# Patient Record
Sex: FEMALE | Race: WHITE | NOT HISPANIC OR LATINO | URBAN - METROPOLITAN AREA
[De-identification: names, ages, dates, MRNs, and addresses within clinical notes are randomized per-mention and may not be internally consistent; named-entity substitution may affect disease eponyms.]

---

## 2018-11-17 ENCOUNTER — INPATIENT (INPATIENT)
Facility: HOSPITAL | Age: 83
LOS: 12 days | Discharge: OTHER INSTITUTION | DRG: 389 | End: 2018-11-30
Attending: INTERNAL MEDICINE | Admitting: INTERNAL MEDICINE
Payer: MEDICARE

## 2018-11-17 VITALS
HEIGHT: 60 IN | RESPIRATION RATE: 19 BRPM | OXYGEN SATURATION: 97 % | DIASTOLIC BLOOD PRESSURE: 83 MMHG | WEIGHT: 119.93 LBS | TEMPERATURE: 98 F | SYSTOLIC BLOOD PRESSURE: 173 MMHG | HEART RATE: 84 BPM

## 2018-11-17 DIAGNOSIS — Z90.49 ACQUIRED ABSENCE OF OTHER SPECIFIED PARTS OF DIGESTIVE TRACT: Chronic | ICD-10-CM

## 2018-11-17 LAB
ALBUMIN SERPL ELPH-MCNC: 3.4 G/DL — SIGNIFICANT CHANGE UP (ref 3.3–5)
ALP SERPL-CCNC: 50 U/L — SIGNIFICANT CHANGE UP (ref 40–120)
ALT FLD-CCNC: 19 U/L — SIGNIFICANT CHANGE UP (ref 12–78)
ANION GAP SERPL CALC-SCNC: 10 MMOL/L — SIGNIFICANT CHANGE UP (ref 5–17)
APPEARANCE UR: CLEAR — SIGNIFICANT CHANGE UP
APTT BLD: 31 SEC — SIGNIFICANT CHANGE UP (ref 27.5–36.3)
AST SERPL-CCNC: 23 U/L — SIGNIFICANT CHANGE UP (ref 15–37)
BASOPHILS # BLD AUTO: 0.04 K/UL — SIGNIFICANT CHANGE UP (ref 0–0.2)
BASOPHILS NFR BLD AUTO: 0.4 % — SIGNIFICANT CHANGE UP (ref 0–2)
BILIRUB SERPL-MCNC: 0.8 MG/DL — SIGNIFICANT CHANGE UP (ref 0.2–1.2)
BILIRUB UR-MCNC: NEGATIVE — SIGNIFICANT CHANGE UP
BUN SERPL-MCNC: 21 MG/DL — SIGNIFICANT CHANGE UP (ref 7–23)
CALCIUM SERPL-MCNC: 8.9 MG/DL — SIGNIFICANT CHANGE UP (ref 8.5–10.1)
CHLORIDE SERPL-SCNC: 102 MMOL/L — SIGNIFICANT CHANGE UP (ref 96–108)
CO2 SERPL-SCNC: 27 MMOL/L — SIGNIFICANT CHANGE UP (ref 22–31)
COLOR SPEC: YELLOW — SIGNIFICANT CHANGE UP
CREAT SERPL-MCNC: 0.85 MG/DL — SIGNIFICANT CHANGE UP (ref 0.5–1.3)
DIFF PNL FLD: ABNORMAL
EOSINOPHIL # BLD AUTO: 0 K/UL — SIGNIFICANT CHANGE UP (ref 0–0.5)
EOSINOPHIL NFR BLD AUTO: 0 % — SIGNIFICANT CHANGE UP (ref 0–6)
GLUCOSE SERPL-MCNC: 158 MG/DL — HIGH (ref 70–99)
GLUCOSE UR QL: NEGATIVE — SIGNIFICANT CHANGE UP
HCT VFR BLD CALC: 42 % — SIGNIFICANT CHANGE UP (ref 34.5–45)
HGB BLD-MCNC: 14.8 G/DL — SIGNIFICANT CHANGE UP (ref 11.5–15.5)
IMM GRANULOCYTES NFR BLD AUTO: 0.4 % — SIGNIFICANT CHANGE UP (ref 0–1.5)
INR BLD: 1.08 RATIO — SIGNIFICANT CHANGE UP (ref 0.88–1.16)
KETONES UR-MCNC: ABNORMAL
LACTATE SERPL-SCNC: 1.6 MMOL/L — SIGNIFICANT CHANGE UP (ref 0.7–2)
LEUKOCYTE ESTERASE UR-ACNC: NEGATIVE — SIGNIFICANT CHANGE UP
LIDOCAIN IGE QN: 145 U/L — SIGNIFICANT CHANGE UP (ref 73–393)
LYMPHOCYTES # BLD AUTO: 0.82 K/UL — LOW (ref 1–3.3)
LYMPHOCYTES # BLD AUTO: 8 % — LOW (ref 13–44)
MCHC RBC-ENTMCNC: 32.5 PG — SIGNIFICANT CHANGE UP (ref 27–34)
MCHC RBC-ENTMCNC: 35.2 GM/DL — SIGNIFICANT CHANGE UP (ref 32–36)
MCV RBC AUTO: 92.3 FL — SIGNIFICANT CHANGE UP (ref 80–100)
MONOCYTES # BLD AUTO: 0.49 K/UL — SIGNIFICANT CHANGE UP (ref 0–0.9)
MONOCYTES NFR BLD AUTO: 4.8 % — SIGNIFICANT CHANGE UP (ref 2–14)
NEUTROPHILS # BLD AUTO: 8.92 K/UL — HIGH (ref 1.8–7.4)
NEUTROPHILS NFR BLD AUTO: 86.4 % — HIGH (ref 43–77)
NITRITE UR-MCNC: NEGATIVE — SIGNIFICANT CHANGE UP
PH UR: 7 — SIGNIFICANT CHANGE UP (ref 5–8)
PLATELET # BLD AUTO: 178 K/UL — SIGNIFICANT CHANGE UP (ref 150–400)
POTASSIUM SERPL-MCNC: 3.6 MMOL/L — SIGNIFICANT CHANGE UP (ref 3.5–5.3)
POTASSIUM SERPL-SCNC: 3.6 MMOL/L — SIGNIFICANT CHANGE UP (ref 3.5–5.3)
PROT SERPL-MCNC: 7.6 G/DL — SIGNIFICANT CHANGE UP (ref 6–8.3)
PROT UR-MCNC: NEGATIVE — SIGNIFICANT CHANGE UP
PROTHROM AB SERPL-ACNC: 12.2 SEC — SIGNIFICANT CHANGE UP (ref 10–12.9)
RBC # BLD: 4.55 M/UL — SIGNIFICANT CHANGE UP (ref 3.8–5.2)
RBC # FLD: 12.3 % — SIGNIFICANT CHANGE UP (ref 10.3–14.5)
SODIUM SERPL-SCNC: 139 MMOL/L — SIGNIFICANT CHANGE UP (ref 135–145)
SP GR SPEC: 1.01 — SIGNIFICANT CHANGE UP (ref 1.01–1.02)
UROBILINOGEN FLD QL: NEGATIVE — SIGNIFICANT CHANGE UP
WBC # BLD: 10.31 K/UL — SIGNIFICANT CHANGE UP (ref 3.8–10.5)
WBC # FLD AUTO: 10.31 K/UL — SIGNIFICANT CHANGE UP (ref 3.8–10.5)

## 2018-11-17 PROCEDURE — 93010 ELECTROCARDIOGRAM REPORT: CPT

## 2018-11-17 PROCEDURE — 74177 CT ABD & PELVIS W/CONTRAST: CPT | Mod: 26

## 2018-11-17 RX ORDER — SODIUM CHLORIDE 9 MG/ML
1000 INJECTION INTRAMUSCULAR; INTRAVENOUS; SUBCUTANEOUS ONCE
Qty: 0 | Refills: 0 | Status: COMPLETED | OUTPATIENT
Start: 2018-11-17 | End: 2018-11-17

## 2018-11-17 RX ORDER — ONDANSETRON 8 MG/1
4 TABLET, FILM COATED ORAL ONCE
Qty: 0 | Refills: 0 | Status: COMPLETED | OUTPATIENT
Start: 2018-11-17 | End: 2018-11-17

## 2018-11-17 RX ORDER — SODIUM CHLORIDE 9 MG/ML
3 INJECTION INTRAMUSCULAR; INTRAVENOUS; SUBCUTANEOUS ONCE
Qty: 0 | Refills: 0 | Status: COMPLETED | OUTPATIENT
Start: 2018-11-17 | End: 2018-11-17

## 2018-11-17 RX ORDER — IOHEXOL 300 MG/ML
30 INJECTION, SOLUTION INTRAVENOUS ONCE
Qty: 0 | Refills: 0 | Status: COMPLETED | OUTPATIENT
Start: 2018-11-17 | End: 2018-11-17

## 2018-11-17 RX ORDER — ACETAMINOPHEN 500 MG
1000 TABLET ORAL ONCE
Qty: 0 | Refills: 0 | Status: COMPLETED | OUTPATIENT
Start: 2018-11-17 | End: 2018-11-17

## 2018-11-17 RX ADMIN — SODIUM CHLORIDE 3 MILLILITER(S): 9 INJECTION INTRAMUSCULAR; INTRAVENOUS; SUBCUTANEOUS at 21:00

## 2018-11-17 RX ADMIN — Medication 1000 MILLIGRAM(S): at 21:42

## 2018-11-17 RX ADMIN — Medication 1000 MILLIGRAM(S): at 21:41

## 2018-11-17 RX ADMIN — IOHEXOL 30 MILLILITER(S): 300 INJECTION, SOLUTION INTRAVENOUS at 21:41

## 2018-11-17 RX ADMIN — SODIUM CHLORIDE 1000 MILLILITER(S): 9 INJECTION INTRAMUSCULAR; INTRAVENOUS; SUBCUTANEOUS at 22:58

## 2018-11-17 RX ADMIN — Medication 400 MILLIGRAM(S): at 21:40

## 2018-11-17 RX ADMIN — ONDANSETRON 4 MILLIGRAM(S): 8 TABLET, FILM COATED ORAL at 21:41

## 2018-11-17 RX ADMIN — SODIUM CHLORIDE 1000 MILLILITER(S): 9 INJECTION INTRAMUSCULAR; INTRAVENOUS; SUBCUTANEOUS at 21:40

## 2018-11-17 NOTE — ED ADULT NURSE NOTE - NSIMPLEMENTINTERV_GEN_ALL_ED
Implemented All Universal Safety Interventions:  [place] to call system. Call bell, personal items and telephone within reach. Instruct patient to call for assistance. Room bathroom lighting operational. Non-slip footwear when patient is off stretcher. Physically safe environment: no spills, clutter or unnecessary equipment. Stretcher in lowest position, wheels locked, appropriate side rails in place.

## 2018-11-17 NOTE — ED PROVIDER NOTE - OBJECTIVE STATEMENT
89 year old female that has a history of a left inguinal hernia came to the ED because of abd pain and vomiting that began yesterday. She has a history of cholecystectomy. No fever, no diarrhea, no rash, no chest pain, no sob. She had a small BM today, but is not passing gas.

## 2018-11-17 NOTE — ED ADULT NURSE REASSESSMENT NOTE - NS ED NURSE REASSESS COMMENT FT1
[id number] A&OX4, respirations regular & unlabored, skin warm & dry. Pt appears comfortable stated had pain medication which helped a lot. Reports minimal discomfort " probably gas". Commode provided, and privacy given. No BM, void only. Safety precautions observed. Pending CT Scan time scheduled 2330.  IV access patent, flushes without difficulty. Vital signs done. Monitored.

## 2018-11-18 DIAGNOSIS — Z86.69 PERSONAL HISTORY OF OTHER DISEASES OF THE NERVOUS SYSTEM AND SENSE ORGANS: Chronic | ICD-10-CM

## 2018-11-18 DIAGNOSIS — Z29.9 ENCOUNTER FOR PROPHYLACTIC MEASURES, UNSPECIFIED: ICD-10-CM

## 2018-11-18 DIAGNOSIS — K56.609 UNSPECIFIED INTESTINAL OBSTRUCTION, UNSPECIFIED AS TO PARTIAL VERSUS COMPLETE OBSTRUCTION: ICD-10-CM

## 2018-11-18 DIAGNOSIS — I10 ESSENTIAL (PRIMARY) HYPERTENSION: ICD-10-CM

## 2018-11-18 DIAGNOSIS — K40.90 UNILATERAL INGUINAL HERNIA, WITHOUT OBSTRUCTION OR GANGRENE, NOT SPECIFIED AS RECURRENT: ICD-10-CM

## 2018-11-18 DIAGNOSIS — Z90.710 ACQUIRED ABSENCE OF BOTH CERVIX AND UTERUS: Chronic | ICD-10-CM

## 2018-11-18 LAB
CULTURE RESULTS: NO GROWTH — SIGNIFICANT CHANGE UP
SPECIMEN SOURCE: SIGNIFICANT CHANGE UP

## 2018-11-18 PROCEDURE — 71045 X-RAY EXAM CHEST 1 VIEW: CPT | Mod: 26,77

## 2018-11-18 PROCEDURE — 71045 X-RAY EXAM CHEST 1 VIEW: CPT | Mod: 26

## 2018-11-18 PROCEDURE — 99285 EMERGENCY DEPT VISIT HI MDM: CPT

## 2018-11-18 RX ORDER — SODIUM CHLORIDE 9 MG/ML
1000 INJECTION INTRAMUSCULAR; INTRAVENOUS; SUBCUTANEOUS
Qty: 0 | Refills: 0 | Status: COMPLETED | OUTPATIENT
Start: 2018-11-18 | End: 2018-11-18

## 2018-11-18 RX ORDER — METOCLOPRAMIDE HCL 10 MG
4 TABLET ORAL EVERY 6 HOURS
Qty: 0 | Refills: 0 | Status: DISCONTINUED | OUTPATIENT
Start: 2018-11-18 | End: 2018-11-18

## 2018-11-18 RX ORDER — SODIUM CHLORIDE 9 MG/ML
1000 INJECTION, SOLUTION INTRAVENOUS
Qty: 0 | Refills: 0 | Status: DISCONTINUED | OUTPATIENT
Start: 2018-11-18 | End: 2018-11-22

## 2018-11-18 RX ORDER — METOPROLOL TARTRATE 50 MG
2.5 TABLET ORAL EVERY 6 HOURS
Qty: 0 | Refills: 0 | Status: DISCONTINUED | OUTPATIENT
Start: 2018-11-18 | End: 2018-11-19

## 2018-11-18 RX ORDER — ENOXAPARIN SODIUM 100 MG/ML
40 INJECTION SUBCUTANEOUS DAILY
Qty: 0 | Refills: 0 | Status: DISCONTINUED | OUTPATIENT
Start: 2018-11-18 | End: 2018-11-30

## 2018-11-18 RX ORDER — ONDANSETRON 8 MG/1
4 TABLET, FILM COATED ORAL EVERY 6 HOURS
Qty: 0 | Refills: 0 | Status: DISCONTINUED | OUTPATIENT
Start: 2018-11-18 | End: 2018-11-30

## 2018-11-18 RX ADMIN — ENOXAPARIN SODIUM 40 MILLIGRAM(S): 100 INJECTION SUBCUTANEOUS at 12:13

## 2018-11-18 RX ADMIN — ONDANSETRON 4 MILLIGRAM(S): 8 TABLET, FILM COATED ORAL at 15:47

## 2018-11-18 RX ADMIN — SODIUM CHLORIDE 75 MILLILITER(S): 9 INJECTION INTRAMUSCULAR; INTRAVENOUS; SUBCUTANEOUS at 05:00

## 2018-11-18 RX ADMIN — Medication 2.5 MILLIGRAM(S): at 12:09

## 2018-11-18 RX ADMIN — Medication 2.5 MILLIGRAM(S): at 17:19

## 2018-11-18 RX ADMIN — SODIUM CHLORIDE 75 MILLILITER(S): 9 INJECTION, SOLUTION INTRAVENOUS at 10:25

## 2018-11-18 RX ADMIN — ONDANSETRON 4 MILLIGRAM(S): 8 TABLET, FILM COATED ORAL at 06:36

## 2018-11-18 NOTE — ED ADULT NURSE REASSESSMENT NOTE - NS ED NURSE REASSESS COMMENT FT1
[id number]  Pt has obstruction in bowel a per Dr Mack, prepared for Nasogastric tube insertion. Explanation provided, to pt & family (daughter) present. Connected pt to cardiopulmonary monitor, O2 Sat 97-98 r/a. Attempted to insert 16 F NG tube x 2 & also with 2nd RN, withou success, pt has mild bleeding, Dr Mack notified.

## 2018-11-18 NOTE — H&P ADULT - NSHPREVIEWOFSYSTEMS_GEN_ALL_CORE
CONSTITUTIONAL: denies fever, chills, fatigue, weakness  HEENT: denies blurred vision, sore throat  SKIN: denies new lesions, rash  CARDIOVASCULAR: denies chest pain, chest pressure, palpitations  RESPIRATORY: denies shortness of breath, sputum production  GASTROINTESTINAL: denies nausea, vomiting, diarrhea, abdominal pain  GENITOURINARY: denies dysuria, discharge  NEUROLOGICAL: denies numbness, headache, focal weakness  MUSCULOSKELETAL: denies new joint pain, muscle aches  HEMATOLOGIC: denies gross bleeding, bruising  LYMPHATICS: denies enlarged lymph nodes, extremity swelling  PSYCHIATRIC: denies recent changes in anxiety, depression  ENDOCRINOLOGIC: denies sweating, cold or heat intolerance CONSTITUTIONAL: denies fever, chills, fatigue, weakness  CARDIOVASCULAR: denies chest pain, chest pressure, palpitations  RESPIRATORY: denies shortness of breath, sputum production  GASTROINTESTINAL: Endorses nausea, vomiting, abdominal pain  GENITOURINARY: denies dysuria, discharge  NEUROLOGICAL: denies numbness, headache, focal weakness  MUSCULOSKELETAL: denies new joint pain, muscle aches  PSYCHIATRIC: denies recent changes in anxiety, depression  EXTREMITIES: chronic varicose veins

## 2018-11-18 NOTE — H&P ADULT - HISTORY OF PRESENT ILLNESS
89 YO F PMHx _____________________________________________ L inguinal hernia ___________ who presents to ED with chief complaint of abdominal pain and vomiting.    In the ED, vitals T 97.6F, HR 84, /83, RR 19, 97% on RA. CBC normal WBC with left shift 86.4% neutrophils and CMP WNL. UA small ketones, small blood, 3-5 RBCs, few bacteria. CT Abdomen and Pelvis Small bowel obstruction with the transition point in the left mid abdomen near the abdominal wall. Bowel containing left inguinal hernia without strangulation or incarceration. EKG: _________________________________________    Given IV zofran 4mg, IV tylenol x 1 and 1L NS bolus. 89 YO F PMHx HTN, visual/auditory hallucinations, s/p cholecystectomy, s/p WILL, L inguinal hernia who presents to ED with chief complaint of abdominal pain and vomiting. Patient states that 2 days ago, she began having abdominal pain. This pain resolved, patient was able to eat and have BMs. Today after eating dinner around 6 pm, patient began having worsening abdominal pain, nausea and vomiting. States she vomited 3-4 times with brownish liquid consistency, NBNB. Patient endorses BM this morning. She denies recent change in stool caliber or consistency, no melena, no hematochezia, no fever/chills. Denies recent travel or change in diet. Of note, patient states she does have issues with chronic constipation and sometimes diarrhea, questionable history of IBS. Patient takes miralax almost daily when constipated. Denies HAs, CP, SOB. Patient mentions that she has L inguinal hernia, has been seen by Dr. Fernandez for it, no intervention to date.     In the ED, vitals T 97.6F, HR 84, /83, RR 19, 97% on RA. CBC normal WBC with left shift 86.4% neutrophils and CMP WNL. UA small ketones, small blood, 3-5 RBCs, few bacteria. CT Abdomen and Pelvis Small bowel obstruction with the transition point in the left mid abdomen near the abdominal wall. Bowel containing left inguinal hernia without strangulation or incarceration. EKG: NSR (unofficial read)    Given IV zofran 4mg, IV tylenol x 1 and 1L NS bolus.

## 2018-11-18 NOTE — ED ADULT NURSE REASSESSMENT NOTE - NS ED NURSE REASSESS COMMENT FT1
0130-145  Pt vomited large amount brown material with pieces of food cleaned & made comfortable . Rec'd smaller size tube 14 [place] NG Tube from other unit (ICU), & NGT successfully inserted by Dr. Mack return of connected to wall suction and [id number] dark brown stomach contents in suction cannister. NG tube secured. Pt had post insertion portable xray, to verify placement. Xray seen by DR. Mack & tube adjusted. Continue to monitor.

## 2018-11-18 NOTE — H&P ADULT - ASSESSMENT
89 YO F PMHx HTN, visual/auditory hallucinations, s/p cholecystectomy, s/p WILL, L inguinal hernia who presents to ED with chief complaint of abdominal pain and vomiting, found to have SBO on CT abdomen. Admit to F for conservative vs surgical management of SBO.

## 2018-11-18 NOTE — H&P ADULT - PSH
History of cholecystectomy History of cholecystectomy    History of retinal detachment    History of total abdominal hysterectomy

## 2018-11-18 NOTE — CHART NOTE - NSCHARTNOTEFT_GEN_A_CORE
Called by RN for patient attempting to pull out NG tube and getting out of bed. Patient seen and examined at bedside. Daughter at bedside. Daughter states that she is going to leave the hospital soon and patient has attempted to get out of bed multiple times and needs to be reoriented. Patient is in no acute distress, alert, oriented to name but not to place or time. Appears pleasant, but attempted to move NG tube and get out of bed, but listens to instructions when reoriented.      Vital Signs Last 24 Hrs  T(C): 36.6 (18 Nov 2018 17:14), Max: 36.7 (17 Nov 2018 23:15)  T(F): 97.8 (18 Nov 2018 17:14), Max: 98 (17 Nov 2018 23:15)  HR: 75 (18 Nov 2018 17:14) (68 - 88)  BP: 155/74 (18 Nov 2018 17:14) (132/75 - 184/75)  BP(mean): --  RR: 16 (18 Nov 2018 17:14) (14 - 19)  SpO2: 96% (18 Nov 2018 17:14) (93% - 99%)    General: alert, oriented to person, not oriented to place or time, NG tube in place   Respiratory: CTA B/L, No W/R/R  CV: RRR, +S1/S2, no murmurs, rubs or gallops  Abdominal: hypoactive BS   Extremities: B/L LE with varicosities, tender to palpation, trace edema   LUNGS: clear to auscultation, no wheezing or rhonchi appreciated  HEART: soft S1/S2, regular rate and rhythm, no murmurs noted      A/P:  89 YO F PMHx HTN, visual/auditory hallucinations, s/p cholecystectomy, s/p WILL, L inguinal hernia, Admitted to [facility] for conservative vs surgical management of SBO. Called by RN for agitation.      -Patient placed on constant observation   -RN to notify if any changes   -Discussed plan with senior

## 2018-11-18 NOTE — H&P ADULT - NSHPSOCIALHISTORY_GEN_ALL_CORE
Lives at home with son. Needs some help with showering but per daughter, independent with most ADLs. Ambulates at home without assistance, not on home O2  Tobacco: Never smoker  EtOH: Never drinker  Drugs: Denies any drug use

## 2018-11-18 NOTE — H&P ADULT - PROBLEM SELECTOR PLAN 1
Nausea/vomiting with CT evidence of SBO  - Admit to [facility]  - NG tube placed in ED, confirmed placement with XR; continue to monitor output  - NPO; maintenance IVFs at 75 ml/hr  - Surgery, Dr. Mora following  - Fall precautions, bed alarm  - Out of bed with assistance

## 2018-11-18 NOTE — ED ADULT NURSE REASSESSMENT NOTE - NS ED NURSE REASSESS COMMENT FT1
[id number]  Verbal report to Ms Cody RN, pt prepared & transported via stretcher. Pt comfortable, offers no complaints.

## 2018-11-18 NOTE — CONSULT NOTE ADULT - SUBJECTIVE AND OBJECTIVE BOX
Patient is a 89y old  Female who presents with a chief complaint of Abdominal pain, vomiting (2018 01:05)      HPI: Pt has a long history of a left inguinal hernia, but has been c/o abdominal pain over the past few weeks.  Her pain increased over the last day and she developed nausea and vomiting.  Her pain was diffuse but never in the hernia.  In the ER the left inguinal was easily reducible.      MEDICATIONS:    MEDICATIONS  (STANDING):  enoxaparin Injectable 40 milliGRAM(s) SubCutaneous daily  metoprolol tartrate Injectable 2.5 milliGRAM(s) IV Push every 6 hours  sodium chloride 0.9% 1000 milliLiter(s) (75 mL/Hr) IV Continuous <Continuous>    MEDICATIONS  (PRN):  ondansetron Injectable 4 milliGRAM(s) IV Push every 6 hours PRN Nausea and/or Vomiting      Allergies    Demerol (Unknown)    Intolerances        PAST MEDICAL & SURGICAL HISTORY:  Left inguinal hernia  Hallucinations: Auditory and visual (per daughter)  HTN (hypertension)  History of retinal detachment  History of total abdominal hysterectomy  History of cholecystectomy        ROS:    CONSTITUTIONAL: No fever, weight loss, or fatigue, hx of hallucinations   EYES: No eye pain, visual disturbances, or discharge, no icteris  ENMT:  No difficulty hearing, tinnitus, vertigo; No sinus or throat pain  NECK: No pain or stiffness  BREASTS: No pain, masses, or nipple discharge  RESPIRATORY: No cough, wheezing, chills or hemoptysis; No shortness of breath  CARDIOVASCULAR: No chest pain, palpitations, dizziness, or leg swelling  GASTROINTESTINAL: diffuse abdominal pain, with nausea and vomiting  GENITOURINARY: No dysuria, frequency, hematuria, or incontinence    Vital Signs Last 24 Hrs  T(C): 36.2 (2018 13:20), Max: 36.7 (2018 23:15)  T(F): 97.2 (2018 13:20), Max: 98 (2018 23:15)  HR: 78 (2018 13:20) (68 - 88)  BP: 158/73 (2018 13:20) (132/75 - 184/75)  BP(mean): --  RR: 16 (2018 13:20) (14 - 19)  SpO2: 95% (2018 13:20) (93% - 99%)    PHYSICAL EXAM:    Constitutional: NAD well-developed A: Ox3  HEENT: PERRLA, EOMI, nonicteric, hard of hearing  Neck: No JVD  Respiratory: clear  Cardiac  NSR, without murmurs  Gastrointestinal: normal bowel sounds, without distension, without guarding or rebound tenderness  Left inguinal hernia easily reducible  Extremities: No peripheral edema  Vascular: 2+ peripheral pulses  Skin: No rashes    LABS:                        14.8   10.31 )-----------( 178      ( 2018 21:00 )             42.0         139  |  102  |  21  ----------------------------<  158<H>  3.6   |  27  |  0.85    Ca    8.9      2018 21:00    TPro  7.6  /  Alb  3.4  /  TBili  0.8  /  DBili  x   /  AST  23  /  ALT  19  /  AlkPhos  50      PT/INR - ( 2018 22:20 )   PT: 12.2 sec;   INR: 1.08 ratio         PTT - ( 2018 22:20 )  PTT:31.0 sec  Urinalysis Basic - ( 2018 21:43 )    Color: Yellow / Appearance: Clear / S.015 / pH: x  Gluc: x / Ketone: Small  / Bili: Negative / Urobili: Negative   Blood: x / Protein: Negative / Nitrite: Negative   Leuk Esterase: Negative / RBC: 3-5 /HPF / WBC 3-5   Sq Epi: x / Non Sq Epi: Few / Bacteria: Few          RADIOLOGY & ADDITIONAL STUDIES:      < from: CT Abdomen and Pelvis w/ Oral Cont and w/ IV Cont (18 @ 23:49) >  EXAM:  CT ABDOMEN AND PELVIS OC IC                            PROCEDURE DATE:  2018          INTERPRETATION:  CLINICAL INFORMATION: Right lower quadrant pain and   vomiting.    COMPARISON: None.        PROCEDURE:   Contiguous axial scan of the abdomen and pelvis with intravenous and oral   contrast, followed by coronal and sagittal reformation.   96 mL of   Omnipaque were administered without adverse reaction.   4 mL of contrast   were discarded.    FINDINGS:    LOWER CHEST: Within normallimits.    LIVER: Within normal limits.  BILE DUCTS: Normal caliber.  GALLBLADDER: Cholecystectomy clips..  SPLEEN: Within normal limits.  PANCREAS: Within normal limits.  ADRENALS: Within normal limits.  KIDNEYS/URETERS: Multiple parapelvic cysts. BLADDER: Within normal limits.  REPRODUCTIVE ORGANS: Status post hysterectomy.     BOWEL: There is diffuse dilatation of the small bowel loops with a   transition point in the left mid abdomen near the abdominal wall (2-65,   601-75, 602-65). A segmentof dilated small bowel loops in the left   inguinal hernia sac. There is no strangulation within the sac. There is   small amount of fluid in the sac. (602-63). Appendix is not visualized.   Colonic diverticulosis without diverticulitis.  PERITONEUM:No ascites.  VESSELS:  Moderate to severe atherosclerotic disease..  RETROPERITONEUM: No lymphadenopathy.    ABDOMINAL WALL: Within normal limits.  BONES: Advanced multilevel degenerative spondylosis.. Scoliotic curvature   of thoracolumbar spine.    IMPRESSION: Small bowel obstruction with the transition point in the left   mid abdomen near the abdominal wall.   Bowel containing left inguinal hernia without strangulation or   incarceration.    Findings were discussed with Dr. Mack with read backat 12:45 AM.                SEAN CHAVEZ M.D., ATTENDING RADIOLOGIST  This document has been electronically signed. 2018 12:47AM        < end of copied text >  -  -  -  -

## 2018-11-18 NOTE — H&P ADULT - NSHPPHYSICALEXAM_GEN_ALL_CORE
T(C): 36.7 (11-17-18 @ 23:15), Max: 36.7 (11-17-18 @ 23:15)  HR: 68 (11-17-18 @ 23:15) (68 - 88)  BP: 132/75 (11-17-18 @ 23:15) (132/75 - 173/83)  RR: 16 (11-17-18 @ 23:15) (15 - 19)  SpO2: 98% (11-17-18 @ 23:15) (97% - 98%)    GENERAL: patient appears well, no acute distress, appropriate, pleasant  EYES: sclera clear, no exudates  ENMT: oropharynx clear without erythema, no exudates, moist mucous membranes  NECK: supple, soft, no thyromegaly noted  LUNGS: good air entry bilaterally, clear to auscultation, symmetric breath sounds, no wheezing or rhonchi appreciated  HEART: soft S1/S2, regular rate and rhythm, no murmurs noted, no lower extremity edema  GASTROINTESTINAL: abdomen is soft, nontender, nondistended, normoactive bowel sounds, no palpable masses  INTEGUMENT: good skin turgor, no lesions noted  MUSCULOSKELETAL: no clubbing or cyanosis, no obvious deformity  NEUROLOGIC: awake, alert, oriented x3, good muscle tone in 4 extremities, no obvious sensory deficits  PSYCHIATRIC: mood is good, affect is congruent, linear and logical thought process  HEME/LYMPH: no palpable supraclavicular nodules, no obvious ecchymosis or petechiae T(C): 36.7 (11-17-18 @ 23:15), Max: 36.7 (11-17-18 @ 23:15)  HR: 68 (11-17-18 @ 23:15) (68 - 88)  BP: 132/75 (11-17-18 @ 23:15) (132/75 - 173/83)  RR: 16 (11-17-18 @ 23:15) (15 - 19)  SpO2: 98% (11-17-18 @ 23:15) (97% - 98%)    GENERAL: patient appears nauseous, no acute distress, elderly  EYES: sclera clear, no exudates, R eye redness > L  ENMT: oropharynx clear without erythema, no exudates, dry mucous membranes  NECK: supple, soft  LUNGS: good air entry bilaterally, clear to auscultation, symmetric breath sounds, no wheezing or rhonchi appreciated  HEART: soft S1/S2, regular rate and rhythm, no murmurs noted  GASTROINTESTINAL: abdomen is soft, tender to palpation in upper quadrants and epigastrium, nondistended, hypoactive bowel sounds in all quadrants, L inguinal hernia, reducible with no overlying erythema  INTEGUMENT: good skin turgor, normal color  MUSCULOSKELETAL: b/l lower extremities with varicosities; tender to palpation   NEUROLOGIC: awake, alert, oriented x3, good muscle tone in 4 extremities, no obvious sensory deficits  PSYCHIATRIC: mood is good, affect is congruent, linear and logical thought process

## 2018-11-18 NOTE — H&P ADULT - PMH
HTN (hypertension) Hallucinations  Auditory and visual (per daughter)  HTN (hypertension)    Left inguinal hernia

## 2018-11-18 NOTE — ED ADULT NURSE REASSESSMENT NOTE - NS ED NURSE REASSESS COMMENT FT1
0254 Continuous close monitoring in progress, vital signs stable, NG tube remained at low intermittent suction & active. Pt's daughter remained at bedside, kept informed with care provided. Pt admitted, awaits orders from Dr. Mar Medical Resident who interviewed pt.

## 2018-11-18 NOTE — H&P ADULT - PROBLEM SELECTOR PLAN 4
IMPROVE VTE Individual Risk Assessment          RISK                                                          Points  [  ] Previous VTE                                                3  [  ] Thrombophilia                                             2  [ x ] Lower limb paralysis                                   2        (unable to hold up >15 seconds)    [  ] Current Cancer                                             2         (within 6 months)  [ x ] Immobilization > 24 hrs                              1  [  ] ICU/CCU stay > 24 hours                             1  [ x ] Age > 60                                                         1    IMPROVE VTE Score: 4    DVT prophylaxis with lovenox 40 QD

## 2018-11-18 NOTE — H&P ADULT - PROBLEM SELECTOR PLAN 2
Patient on home cardizem 120 daily  - Currently NPO, will hold BP meds for now; daughter denies patient with history of arrythmia; takes cardizem for HTN only  - Can consider PRN med for BP if pressure high, BP currently stable with systolic around 130, HR 63  - Hold ASA 81 while NPO, no history of MI, stroke or stent

## 2018-11-18 NOTE — ED ADULT NURSE REASSESSMENT NOTE - NS ED NURSE REASSESS COMMENT FT1
2347 Returned from Ct Scan, no distress. Pending CT Scan findings, and further evaluation. Safety /fall precautions maintained.

## 2018-11-18 NOTE — H&P ADULT - PROBLEM SELECTOR PLAN 3
Reducible, not painful to palpation; CT without evidence of incarceration/strangulation  - Continue to monitor

## 2018-11-18 NOTE — ED ADULT NURSE REASSESSMENT NOTE - NS ED NURSE REASSESS COMMENT FT1
[id number] Pt voided in commode safety precautions maintained.  Called to give report to primary RN, unable to at this time., spoke with Ms Cody.

## 2018-11-19 DIAGNOSIS — D72.829 ELEVATED WHITE BLOOD CELL COUNT, UNSPECIFIED: ICD-10-CM

## 2018-11-19 DIAGNOSIS — E87.6 HYPOKALEMIA: ICD-10-CM

## 2018-11-19 DIAGNOSIS — Z71.89 OTHER SPECIFIED COUNSELING: ICD-10-CM

## 2018-11-19 LAB
ALBUMIN SERPL ELPH-MCNC: 3.1 G/DL — LOW (ref 3.3–5)
ALP SERPL-CCNC: 42 U/L — SIGNIFICANT CHANGE UP (ref 40–120)
ALT FLD-CCNC: 17 U/L — SIGNIFICANT CHANGE UP (ref 12–78)
ANION GAP SERPL CALC-SCNC: 10 MMOL/L — SIGNIFICANT CHANGE UP (ref 5–17)
AST SERPL-CCNC: 28 U/L — SIGNIFICANT CHANGE UP (ref 15–37)
BILIRUB SERPL-MCNC: 1 MG/DL — SIGNIFICANT CHANGE UP (ref 0.2–1.2)
BUN SERPL-MCNC: 16 MG/DL — SIGNIFICANT CHANGE UP (ref 7–23)
CALCIUM SERPL-MCNC: 8 MG/DL — LOW (ref 8.5–10.1)
CHLORIDE SERPL-SCNC: 107 MMOL/L — SIGNIFICANT CHANGE UP (ref 96–108)
CO2 SERPL-SCNC: 26 MMOL/L — SIGNIFICANT CHANGE UP (ref 22–31)
CREAT SERPL-MCNC: 0.63 MG/DL — SIGNIFICANT CHANGE UP (ref 0.5–1.3)
GLUCOSE SERPL-MCNC: 117 MG/DL — HIGH (ref 70–99)
HCT VFR BLD CALC: 40.8 % — SIGNIFICANT CHANGE UP (ref 34.5–45)
HGB BLD-MCNC: 14.2 G/DL — SIGNIFICANT CHANGE UP (ref 11.5–15.5)
MCHC RBC-ENTMCNC: 32.1 PG — SIGNIFICANT CHANGE UP (ref 27–34)
MCHC RBC-ENTMCNC: 34.8 GM/DL — SIGNIFICANT CHANGE UP (ref 32–36)
MCV RBC AUTO: 92.1 FL — SIGNIFICANT CHANGE UP (ref 80–100)
NRBC # BLD: 0 /100 WBCS — SIGNIFICANT CHANGE UP (ref 0–0)
PLATELET # BLD AUTO: 181 K/UL — SIGNIFICANT CHANGE UP (ref 150–400)
POTASSIUM SERPL-MCNC: 3.2 MMOL/L — LOW (ref 3.5–5.3)
POTASSIUM SERPL-SCNC: 3.2 MMOL/L — LOW (ref 3.5–5.3)
PROT SERPL-MCNC: 6.6 G/DL — SIGNIFICANT CHANGE UP (ref 6–8.3)
RBC # BLD: 4.43 M/UL — SIGNIFICANT CHANGE UP (ref 3.8–5.2)
RBC # FLD: 12.1 % — SIGNIFICANT CHANGE UP (ref 10.3–14.5)
SODIUM SERPL-SCNC: 143 MMOL/L — SIGNIFICANT CHANGE UP (ref 135–145)
WBC # BLD: 12.29 K/UL — HIGH (ref 3.8–10.5)
WBC # FLD AUTO: 12.29 K/UL — HIGH (ref 3.8–10.5)

## 2018-11-19 PROCEDURE — 74019 RADEX ABDOMEN 2 VIEWS: CPT | Mod: 26

## 2018-11-19 RX ORDER — METOPROLOL TARTRATE 50 MG
5 TABLET ORAL EVERY 6 HOURS
Qty: 0 | Refills: 0 | Status: DISCONTINUED | OUTPATIENT
Start: 2018-11-19 | End: 2018-11-27

## 2018-11-19 RX ORDER — POTASSIUM CHLORIDE 20 MEQ
10 PACKET (EA) ORAL
Qty: 0 | Refills: 0 | Status: DISCONTINUED | OUTPATIENT
Start: 2018-11-19 | End: 2018-11-19

## 2018-11-19 RX ORDER — POTASSIUM CHLORIDE 20 MEQ
10 PACKET (EA) ORAL
Qty: 0 | Refills: 0 | Status: COMPLETED | OUTPATIENT
Start: 2018-11-19 | End: 2018-11-19

## 2018-11-19 RX ADMIN — Medication 5 MILLIGRAM(S): at 21:29

## 2018-11-19 RX ADMIN — Medication 100 MILLIEQUIVALENT(S): at 14:55

## 2018-11-19 RX ADMIN — Medication 100 MILLIEQUIVALENT(S): at 15:55

## 2018-11-19 RX ADMIN — Medication 2.5 MILLIGRAM(S): at 06:23

## 2018-11-19 RX ADMIN — Medication 5 MILLIGRAM(S): at 17:56

## 2018-11-19 RX ADMIN — Medication 10 MILLIGRAM(S): at 12:01

## 2018-11-19 RX ADMIN — Medication 100 MILLIEQUIVALENT(S): at 12:53

## 2018-11-19 RX ADMIN — Medication 5 MILLIGRAM(S): at 12:54

## 2018-11-19 RX ADMIN — ENOXAPARIN SODIUM 40 MILLIGRAM(S): 100 INJECTION SUBCUTANEOUS at 12:54

## 2018-11-19 RX ADMIN — Medication 2.5 MILLIGRAM(S): at 00:34

## 2018-11-19 NOTE — PROGRESS NOTE ADULT - SUBJECTIVE AND OBJECTIVE BOX
Patient is a 89y old  Female who presents with a chief complaint of Abdominal pain, vomiting, SBO (2018 10:49)      INTERVAL HPI/OVERNIGHT EVENTS:  T(C): 36.8 (18 @ 09:25), Max: 36.8 (18 @ 09:25)  HR: 69 (18 @ 09:25) (60 - 99)  BP: 157/68 (18 @ 09:25) (153/75 - 184/75)  RR: 12 (18 @ 09:25) (12 - 18)  SpO2: 96% (18 @ 09:25) (94% - 96%)  Wt(kg): --  I&O's Summary    2018 07:01  -  2018 07:00  --------------------------------------------------------  IN: 900 mL / OUT: 850 mL / NET: 50 mL        LABS:                        14.2   12.29 )-----------( 181      ( 2018 08:47 )             40.8         143  |  107  |  16  ----------------------------<  117<H>  3.2<L>   |  26  |  0.63    Ca    8.0<L>      2018 08:47    TPro  6.6  /  Alb  3.1<L>  /  TBili  1.0  /  DBili  x   /  AST  28  /  ALT  17  /  AlkPhos  42      PT/INR - ( 2018 22:20 )   PT: 12.2 sec;   INR: 1.08 ratio         PTT - ( 2018 22:20 )  PTT:31.0 sec  Urinalysis Basic - ( 2018 21:43 )    Color: Yellow / Appearance: Clear / S.015 / pH: x  Gluc: x / Ketone: Small  / Bili: Negative / Urobili: Negative   Blood: x / Protein: Negative / Nitrite: Negative   Leuk Esterase: Negative / RBC: 3-5 /HPF / WBC 3-5   Sq Epi: x / Non Sq Epi: Few / Bacteria: Few      CAPILLARY BLOOD GLUCOSE            Urinalysis Basic - ( 2018 21:43 )    Color: Yellow / Appearance: Clear / S.015 / pH: x  Gluc: x / Ketone: Small  / Bili: Negative / Urobili: Negative   Blood: x / Protein: Negative / Nitrite: Negative   Leuk Esterase: Negative / RBC: 3-5 /HPF / WBC 3-5   Sq Epi: x / Non Sq Epi: Few / Bacteria: Few        MEDICATIONS  (STANDING):  bisacodyl Suppository 10 milliGRAM(s) Rectal once  enoxaparin Injectable 40 milliGRAM(s) SubCutaneous daily  metoprolol tartrate Injectable 2.5 milliGRAM(s) IV Push every 6 hours  potassium chloride  10 mEq/100 mL IVPB 10 milliEquivalent(s) IV Intermittent every 1 hour  sodium chloride 0.9% 1000 milliLiter(s) (75 mL/Hr) IV Continuous <Continuous>    MEDICATIONS  (PRN):  ondansetron Injectable 4 milliGRAM(s) IV Push every 6 hours PRN Nausea and/or Vomiting      REVIEW OF SYSTEMS:  CONSTITUTIONAL: No fever, weight loss, or fatigue  EYES: No eye pain, visual disturbances, or discharge  ENMT:  No difficulty hearing, tinnitus, vertigo; No sinus or throat pain  NECK: No pain or stiffness  RESPIRATORY: No cough, wheezing, chills or hemoptysis; No shortness of breath  CARDIOVASCULAR: No chest pain, palpitations, dizziness, or leg swelling  GASTROINTESTINAL: No abdominal or epigastric pain. No nausea, vomiting, or hematemesis; No diarrhea or constipation. No melena or hematochezia.  GENITOURINARY: No dysuria, frequency, hematuria, or incontinence  NEUROLOGICAL: No headaches, memory loss, loss of strength, numbness, or tremors  SKIN: No itching, burning, rashes, or lesions   LYMPH NODES: No enlarged glands  ENDOCRINE: No heat or cold intolerance; No hair loss  MUSCULOSKELETAL: No joint pain or swelling; No muscle, back, or extremity pain  PSYCHIATRIC: No depression, anxiety, mood swings, or difficulty sleeping  HEME/LYMPH: No easy bruising, or bleeding gums  ALLERY AND IMMUNOLOGIC: No hives or eczema    RADIOLOGY & ADDITIONAL TESTS:    Imaging Personally Reviewed:  [ ] YES  [ ] NO    Consultant(s) Notes Reviewed:  [ ] YES  [ ] NO    PHYSICAL EXAM:  GENERAL: NAD, well-groomed, well-developed  HEAD:  Atraumatic, Normocephalic  EYES: EOMI, PERRLA, conjunctiva and sclera clear  ENMT: No tonsillar erythema, exudates, or enlargement; Moist mucous membranes, Good dentition, No lesions  NECK: Supple, No JVD, Normal thyroid  NERVOUS SYSTEM:  Alert & Oriented X3, Good concentration; Motor Strength 5/5 B/L upper and lower extremities; DTRs 2+ intact and symmetric  CHEST/LUNG: Clear to percussion bilaterally; No rales, rhonchi, wheezing, or rubs  HEART: Regular rate and rhythm; No murmurs, rubs, or gallops  ABDOMEN: Soft, Nontender, Nondistended; Bowel sounds present  EXTREMITIES:  2+ Peripheral Pulses, No clubbing, cyanosis, or edema  LYMPH: No lymphadenopathy noted  SKIN: No rashes or lesions    Care Discussed with Consultants/Other Providers [ ] YES  [ ] NO Patient is a 89y old Female who presents with a chief complaint of Abdominal pain, vomiting, SBO.      INTERVAL HPI/OVERNIGHT EVENTS: Pt seen and examined at bedside chair with daughter present in room. Pt sleeping at time of exam, but is able to be aroused. Daughter states pt did not sleep well, and was agitated when woken up earlier in AM. Pt states she is not in any pain, and feels well otherwise. Denies any HA, dizziness, CP, SOB, N/V/D or abd tenderness.    T(C): 36.8 (18 @ 09:25), Max: 36.8 (18 @ 09:25)  HR: 69 (18 @ 09:25) (60 - 99)  BP: 157/68 (18 @ 09:25) (153/75 - 184/75)  RR: 12 (18 @ 09:25) (12 - 18)  SpO2: 96% (18 @ 09:25) (94% - 96%)  Wt(kg): --  I&O's Summary    2018 07:01  -  2018 07:00  --------------------------------------------------------  IN: 900 mL / OUT: 850 mL / NET: 50 mL        LABS:                        14.2   12.29 )-----------( 181      ( 2018 08:47 )             40.8         143  |  107  |  16  ----------------------------<  117<H>  3.2<L>   |  26  |  0.63    Ca    8.0<L>      2018 08:47    TPro  6.6  /  Alb  3.1<L>  /  TBili  1.0  /  DBili  x   /  AST  28  /  ALT  17  /  AlkPhos  42      PT/INR - ( 2018 22:20 )   PT: 12.2 sec;   INR: 1.08 ratio         PTT - ( 2018 22:20 )  PTT:31.0 sec  Urinalysis Basic - ( 2018 21:43 )    Color: Yellow / Appearance: Clear / S.015 / pH: x  Gluc: x / Ketone: Small  / Bili: Negative / Urobili: Negative   Blood: x / Protein: Negative / Nitrite: Negative   Leuk Esterase: Negative / RBC: 3-5 /HPF / WBC 3-5   Sq Epi: x / Non Sq Epi: Few / Bacteria: Few      CAPILLARY BLOOD GLUCOSE            Urinalysis Basic - ( 2018 21:43 )    Color: Yellow / Appearance: Clear / S.015 / pH: x  Gluc: x / Ketone: Small  / Bili: Negative / Urobili: Negative   Blood: x / Protein: Negative / Nitrite: Negative   Leuk Esterase: Negative / RBC: 3-5 /HPF / WBC 3-5   Sq Epi: x / Non Sq Epi: Few / Bacteria: Few        MEDICATIONS  (STANDING):  bisacodyl Suppository 10 milliGRAM(s) Rectal once  enoxaparin Injectable 40 milliGRAM(s) SubCutaneous daily  metoprolol tartrate Injectable 2.5 milliGRAM(s) IV Push every 6 hours  potassium chloride  10 mEq/100 mL IVPB 10 milliEquivalent(s) IV Intermittent every 1 hour  sodium chloride 0.9% 1000 milliLiter(s) (75 mL/Hr) IV Continuous <Continuous>    MEDICATIONS  (PRN):  ondansetron Injectable 4 milliGRAM(s) IV Push every 6 hours PRN Nausea and/or Vomiting      REVIEW OF SYSTEMS:  CONSTITUTIONAL: No fever, weight loss, or fatigue  EYES: No eye pain, visual disturbances, or discharge  ENMT:  No difficulty hearing, tinnitus, vertigo; No sinus or throat pain  NECK: No pain or stiffness  RESPIRATORY: No cough, wheezing, chills or hemoptysis; No shortness of breath  CARDIOVASCULAR: No chest pain, palpitations, dizziness, or leg swelling  GASTROINTESTINAL: No abdominal or epigastric pain. No nausea, vomiting, or hematemesis; No diarrhea or constipation. No melena or hematochezia.  GENITOURINARY: No dysuria, frequency, hematuria, or incontinence  NEUROLOGICAL: No headaches, memory loss, loss of strength, numbness, or tremors  SKIN: No itching, burning, rashes, or lesions   MUSCULOSKELETAL: No joint pain or swelling; No muscle, back, or extremity pain  PSYCHIATRIC: No depression, anxiety, mood swings, or difficulty sleeping  HEME/LYMPH: No easy bruising, or bleeding gums  ALLERY AND IMMUNOLOGIC: No hives or eczema    RADIOLOGY & ADDITIONAL TESTS:    Imaging Personally Reviewed:  [x ] YES  [ ] NO    Consultant(s) Notes Reviewed:  [x ] YES  [ ] NO    PHYSICAL EXAM:  GENERAL: elderly female in NAD  HEAD:  Atraumatic, Normocephalic  EYES: EOMI, PERRLA, conjunctiva and sclera clear  ENMT: No tonsillar erythema, exudates, or enlargement; Moist mucous membranes, Good dentition, No lesions. (+) NGT in place.  NECK: Supple, No JVD, Normal thyroid  NERVOUS SYSTEM: AAOx1 (person), nonfocal CN II-XII grossly intact  CHEST/LUNG: CTA bilaterally; No rales, rhonchi, wheezing, or rubs  HEART: Regular rate and rhythm; No murmurs, rubs, or gallops  ABDOMEN: Soft, Nontender, Nondistended; Bowel sounds present x4  EXTREMITIES: 2+ Peripheral Pulses, No clubbing, cyanosis, or edema  LYMPH: No lymphadenopathy noted  SKIN: No rashes or lesions    Care Discussed with Consultants/Other Providers [x ] YES  [ ] NO

## 2018-11-19 NOTE — CHART NOTE - NSCHARTNOTEFT_GEN_A_CORE
Pt is a 89 y/o F with PMHx HTN, visual/auditory hallucinations, s/p cholecystectomy, s/p WILL, L inguinal hernia admitted to [facility] for conservative vs surgical management of SBO. Called by RN for patient coughing up blood. Patient seen and examined at bedside. Patient has NG tube in place. Daughter at bedside. Denies any chest pain, SOB, abdominal pain, N/V. Patient has coughed up scant clotted blood 3 times in the last hour.     Vital Signs Last 24 Hrs  T(C): 36.4 (19 Nov 2018 20:07), Max: 36.8 (19 Nov 2018 09:25)  T(F): 97.6 (19 Nov 2018 20:07), Max: 98.3 (19 Nov 2018 09:25)  HR: 76 (19 Nov 2018 20:07) (60 - 99)  BP: 169/75 (19 Nov 2018 20:07) (145/64 - 169/75)  BP(mean): --  RR: 18 (19 Nov 2018 20:07) (12 - 18)  SpO2: 92% (19 Nov 2018 20:07) (92% - 96%)      PHYSICAL EXAM:  GENERAL: elderly female in NAD  HEAD:  Atraumatic, Normocephalic  ENMT: NG tube in place, visible clotted blood on hard palate as well as R nostril, airway patent   NERVOUS SYSTEM: AAOx1 (person), nonfocal   CHEST/LUNG: CTA bilaterally; No rales, rhonchi, wheezing, or rubs  HEART: Regular rate and rhythm; No murmurs, rubs, or gallops  ABDOMEN: Soft, Nontender, Nondistended; +BS   EXTREMITIES: , No clubbing, cyanosis, or edema    A/P:  Pt is a 89 y/o F with PMHx HTN, visual/auditory hallucinations, s/p cholecystectomy, s/p WILL, L inguinal hernia admitted to [facility] for conservative vs surgical management of SBO, NG tube in place. Called by RN for patient coughing up blood.     -Likely secondary to trauma from NG tube as blood appears scant and clotted in nature, likely draining from nasal passage   -Currently hemodynamically stable, RN to notify in changes   -No overt signs of bleeding   -Will monitor  -F/U CBC in AM   -Plan discussed with senior

## 2018-11-19 NOTE — PROGRESS NOTE ADULT - PROBLEM SELECTOR PLAN 4
advance care planning discussed with family.  pt to remain dnr  completed MOLST form placed in chart - chronic, stable  - IV Lopressor increased to 5mg q6h in lieu of cardizem. Continue w/ hold parameters.

## 2018-11-19 NOTE — PROGRESS NOTE ADULT - ASSESSMENT
Pt is a 89 y/o F with PMHx HTN, visual/auditory hallucinations, s/p cholecystectomy, s/p WILL, L inguinal hernia admitted to [facility] for conservative vs surgical management of SBO.

## 2018-11-19 NOTE — PROGRESS NOTE ADULT - SUBJECTIVE AND OBJECTIVE BOX
HOSPITAL DAY#: 2    SUBJECTIVE:  Patient seen and examined at beside.  Patient with no new complaints.  Patient currently NPO with NGT on wall suction, outputting 850ml over the past 24 hours, has not had BM.  Patient is confused and is a poor historian.  Patient denies any chest pain, SOB.    Vital Signs Last 24 Hrs  T(C): 36.8 (2018 09:25), Max: 36.8 (2018 09:25)  T(F): 98.3 (2018 09:25), Max: 98.3 (2018 09:25)  HR: 69 (2018 09:) (60 - 99)  BP: 157/68 (2018 09:25) (153/75 - 184/75)  BP(mean): --  RR: 12 (2018 09:25) (12 - 18)  SpO2: 96% (:) (94% - 96%)    PHYSICAL EXAM:  GENERAL: Elderly female lying in bed in NAD  HEAD:  Atraumatic, Normocephalic  CHEST/LUNG: CTAB; No wheezes, rales, or rhonchi  HEART: Regular rate and rhythm; No murmurs, rubs, or gallops  ABDOMEN: Soft, non-distended, mild TTP throughout; normal bowel sounds  NEUROLOGY: A&O x 1    I&O's Summary    2018 07:  -  2018 07:00  --------------------------------------------------------  IN: 900 mL / OUT: 850 mL / NET: 50 mL      I&O's Detail    2018 07:  -  2018 07:00  --------------------------------------------------------  IN:    sodium chloride 0.9%: 900 mL  Total IN: 900 mL    OUT:    Nasoenteral Tube: 850 mL  Total OUT: 850 mL    Total NET: 50 mL      MEDICATIONS  (STANDING):  enoxaparin Injectable 40 milliGRAM(s) SubCutaneous daily  metoprolol tartrate Injectable 2.5 milliGRAM(s) IV Push every 6 hours  sodium chloride 0.9% 1000 milliLiter(s) (75 mL/Hr) IV Continuous <Continuous>    MEDICATIONS  (PRN):  ondansetron Injectable 4 milliGRAM(s) IV Push every 6 hours PRN Nausea and/or Vomiting      LABS:                        14.2   12.29 )-----------( 181      ( 2018 08:47 )             40.8         143  |  107  |  16  ----------------------------<  117<H>  3.2<L>   |  26  |  0.63    Ca    8.0<L>      2018 08:47    TPro  6.6  /  Alb  x   /  TBili  1.0  /  DBili  x   /  AST  28  /  ALT  17  /  AlkPhos  42      PT/INR - ( 2018 22:20 )   PT: 12.2 sec;   INR: 1.08 ratio         PTT - ( 2018 22:20 )  PTT:31.0 sec  Urinalysis Basic - ( 2018 21:43 )    Color: Yellow / Appearance: Clear / S.015 / pH: x  Gluc: x / Ketone: Small  / Bili: Negative / Urobili: Negative   Blood: x / Protein: Negative / Nitrite: Negative   Leuk Esterase: Negative / RBC: 3-5 /HPF / WBC 3-5   Sq Epi: x / Non Sq Epi: Few / Bacteria: Few      RADIOLOGY & ADDITIONAL STUDIES:  < from: Xray Abdomen 2 Views (18 @ 08:47) >  Fluid filled loops of bowel with paucity of gas, patient has known bowel   obstruction as demonstrated on CT 2018.  < end of copied text >    ASSESSMENT  89 year old female, A&O x 1, on 1-to-1 observation for attempting to remove NGT, admitted for SBO, NPO with NGT to wall suction with 850ml output in 24 hours.  AXR performed today showing obstruction as seen on CT in ED.  Noted to be hypokalemic today to 3.2, and leukocytosis of 12.29, afebrile.    PLAN  - No emergent surgical intervention  - NPO, IVF  - NGT to wall suction, monitor output  - supplemented 30meq K.  Follow up repeat BMP in AM  - Monitor WBC with daily CBC  - Case to be discussed with Dr. Fernandez.  Will follow

## 2018-11-19 NOTE — GOALS OF CARE CONVERSATION - PERSONAL ADVANCE DIRECTIVE - CONVERSATION DETAILS
Spoke to pts daughter about advance directives and explained resuscitation. She states that she does not want CPR attempted on pt nor does she want intubation

## 2018-11-19 NOTE — PROGRESS NOTE ADULT - ASSESSMENT
IMPRESSION persistent SBO  PLAN replace KCL           encourage OOB           suppository now           repeat xrays in am

## 2018-11-19 NOTE — PROGRESS NOTE ADULT - PROBLEM SELECTOR PLAN 1
cont ngt, ivf  follow up serial xray - surgery following (Dr. Fernandez). Plan for suppository, and repeat Xrays in AM.  - Abd Xray 11/19 showing Fluid filled loops of bowel with paucity of gas.   - F/u AM Xrays.  - encourage OOB, ambulation as tolerated.   - NPO, continue NGT, IVF NS @75cc/hr - surgery following (Dr. Fernandez). Plan for suppository, and repeat Xrays in AM.  - Abd Xray 11/19 showing Fluid filled loops of bowel with paucity of gas.   - F/u AM Xrays.  - encourage OOB, ambulation as tolerated. Encouraged use of Incentive spirometer.  - NPO, continue NGT, IVF NS @75cc/hr

## 2018-11-19 NOTE — PROGRESS NOTE ADULT - PROBLEM SELECTOR PLAN 6
advance care planning discussed with family.  pt to remain dnr  completed MOLST form placed in chart

## 2018-11-19 NOTE — PROGRESS NOTE ADULT - PROBLEM SELECTOR PLAN 3
lovenox - K 3.2, however pt asymptomatic. Repleted with 20 meq in NS, and 10 meq x3 doses.  - Phos 2.2, f/u AM CMP - K 3.2, however pt asymptomatic. Repleted with 20 meq in NS, and 10 meq x3 doses. F/u AM CMP.  - Phos 2.2, f/u AM Phos level

## 2018-11-19 NOTE — PROGRESS NOTE ADULT - SUBJECTIVE AND OBJECTIVE BOX
Subjective: Pt lethargic,(didn't sleep last pm), without flatus or BM.    Objective:  Vital Signs Last 24 Hrs  T(C): 36.8 (19 Nov 2018 09:25), Max: 36.8 (19 Nov 2018 09:25)  T(F): 98.3 (19 Nov 2018 09:25), Max: 98.3 (19 Nov 2018 09:25)  HR: 69 (19 Nov 2018 09:25) (60 - 99)  BP: 157/68 (19 Nov 2018 09:25) (153/75 - 184/75)  BP(mean): --  RR: 12 (19 Nov 2018 09:25) (12 - 18)  SpO2: 96% (19 Nov 2018 09:25) (94% - 96%)    Heent: LEONEL, FREOM  Pul: decreased at bases  Cor: RSR, without murmurs  Abdomen: normal bowel sounds, without distension, with diffuse tenderness, without guarding or rebound tenderness  Extremities: without edema, motor/sensory intact, without calf pain, Homans sign negative.                        14.2   12.29 )-----------( 181      ( 19 Nov 2018 08:47 )             40.8       11-19    143  |  107  |  16  ----------------------------<  117<H>  3.2<L>   |  26  |  0.63    Ca    8.0<L>      19 Nov 2018 08:47    TPro  6.6  /  Alb  3.1<L>  /  TBili  1.0  /  DBili  x   /  AST  28  /  ALT  17  /  AlkPhos  42  11-19 11-18 @ 07:01  -  11-19 @ 07:00  --------------------------------------------------------  IN:    sodium chloride 0.9%: 900 mL  Total IN: 900 mL    OUT:    Nasoenteral Tube: 850 mL  Total OUT: 850 mL    Total NET: 50 mL        < from: Xray Abdomen 2 Views (11.19.18 @ 08:47) >  EXAM:  XR ABDOMEN 2V                            PROCEDURE DATE:  11/19/2018          INTERPRETATION:      REASON FOR EXAM: Bowel obstruction.         TECHNIQUE: 2 views of the abdomen.    COMPARISON: CT abdomen pelvis 11/17/2018.    FINDINGS:    There is fluid filled loops of bowel with paucity of distal gas.  NG tube   is present in the left upper quadrant. There is no free air. There is   osteopenia, scoliosis and degenerative changes of the spine.    There   appears to be bibasilar effusion,left greater than right and compressive   atelectasis area right paraspinal surgical clips are present.    IMPRESSION:  Fluid filled loops of bowel with paucity of gas, patient has known bowel   obstruction as demonstrated on CT 11/17/2018.    < end of copied text >

## 2018-11-19 NOTE — PROGRESS NOTE ADULT - PROBLEM SELECTOR PLAN 2
iv lopressor in lieu of cardizem - WBC 12.29, however pt is afebrile  - pt without evidence for acute abdomen, denies pain. Abd NT, ND.   - continue to monitor, f/u AM CBC

## 2018-11-20 DIAGNOSIS — S09.92XA UNSPECIFIED INJURY OF NOSE, INITIAL ENCOUNTER: ICD-10-CM

## 2018-11-20 LAB
ALBUMIN SERPL ELPH-MCNC: 2.7 G/DL — LOW (ref 3.3–5)
ALP SERPL-CCNC: 37 U/L — LOW (ref 40–120)
ALT FLD-CCNC: 15 U/L — SIGNIFICANT CHANGE UP (ref 12–78)
AMYLASE P1 CFR SERPL: 30 U/L — SIGNIFICANT CHANGE UP (ref 25–115)
ANION GAP SERPL CALC-SCNC: 12 MMOL/L — SIGNIFICANT CHANGE UP (ref 5–17)
AST SERPL-CCNC: 23 U/L — SIGNIFICANT CHANGE UP (ref 15–37)
BILIRUB SERPL-MCNC: 1 MG/DL — SIGNIFICANT CHANGE UP (ref 0.2–1.2)
BUN SERPL-MCNC: 21 MG/DL — SIGNIFICANT CHANGE UP (ref 7–23)
CALCIUM SERPL-MCNC: 7.8 MG/DL — LOW (ref 8.5–10.1)
CHLORIDE SERPL-SCNC: 108 MMOL/L — SIGNIFICANT CHANGE UP (ref 96–108)
CO2 SERPL-SCNC: 25 MMOL/L — SIGNIFICANT CHANGE UP (ref 22–31)
CREAT SERPL-MCNC: 0.59 MG/DL — SIGNIFICANT CHANGE UP (ref 0.5–1.3)
GLUCOSE SERPL-MCNC: 81 MG/DL — SIGNIFICANT CHANGE UP (ref 70–99)
HCT VFR BLD CALC: 38.8 % — SIGNIFICANT CHANGE UP (ref 34.5–45)
HGB BLD-MCNC: 13.3 G/DL — SIGNIFICANT CHANGE UP (ref 11.5–15.5)
LDH SERPL L TO P-CCNC: 237 U/L — SIGNIFICANT CHANGE UP (ref 50–242)
MAGNESIUM SERPL-MCNC: 1.8 MG/DL — SIGNIFICANT CHANGE UP (ref 1.6–2.6)
MCHC RBC-ENTMCNC: 31.5 PG — SIGNIFICANT CHANGE UP (ref 27–34)
MCHC RBC-ENTMCNC: 34.3 GM/DL — SIGNIFICANT CHANGE UP (ref 32–36)
MCV RBC AUTO: 91.9 FL — SIGNIFICANT CHANGE UP (ref 80–100)
NRBC # BLD: 0 /100 WBCS — SIGNIFICANT CHANGE UP (ref 0–0)
PHOSPHATE SERPL-MCNC: 2.6 MG/DL — SIGNIFICANT CHANGE UP (ref 2.5–4.5)
PLATELET # BLD AUTO: 160 K/UL — SIGNIFICANT CHANGE UP (ref 150–400)
POTASSIUM SERPL-MCNC: 3.4 MMOL/L — LOW (ref 3.5–5.3)
POTASSIUM SERPL-SCNC: 3.4 MMOL/L — LOW (ref 3.5–5.3)
PROT SERPL-MCNC: 6.2 G/DL — SIGNIFICANT CHANGE UP (ref 6–8.3)
RBC # BLD: 4.22 M/UL — SIGNIFICANT CHANGE UP (ref 3.8–5.2)
RBC # FLD: 12.3 % — SIGNIFICANT CHANGE UP (ref 10.3–14.5)
SODIUM SERPL-SCNC: 145 MMOL/L — SIGNIFICANT CHANGE UP (ref 135–145)
WBC # BLD: 10.18 K/UL — SIGNIFICANT CHANGE UP (ref 3.8–10.5)
WBC # FLD AUTO: 10.18 K/UL — SIGNIFICANT CHANGE UP (ref 3.8–10.5)

## 2018-11-20 PROCEDURE — 74019 RADEX ABDOMEN 2 VIEWS: CPT | Mod: 26

## 2018-11-20 RX ORDER — MAGNESIUM SULFATE 500 MG/ML
2 VIAL (ML) INJECTION ONCE
Qty: 0 | Refills: 0 | Status: COMPLETED | OUTPATIENT
Start: 2018-11-20 | End: 2018-11-20

## 2018-11-20 RX ORDER — POTASSIUM CHLORIDE 20 MEQ
10 PACKET (EA) ORAL
Qty: 0 | Refills: 0 | Status: COMPLETED | OUTPATIENT
Start: 2018-11-20 | End: 2018-11-20

## 2018-11-20 RX ADMIN — ENOXAPARIN SODIUM 40 MILLIGRAM(S): 100 INJECTION SUBCUTANEOUS at 11:11

## 2018-11-20 RX ADMIN — Medication 100 MILLIEQUIVALENT(S): at 09:53

## 2018-11-20 RX ADMIN — Medication 5 MILLIGRAM(S): at 05:22

## 2018-11-20 RX ADMIN — Medication 50 GRAM(S): at 12:41

## 2018-11-20 RX ADMIN — Medication 5 MILLIGRAM(S): at 11:10

## 2018-11-20 RX ADMIN — Medication 5 MILLIGRAM(S): at 23:32

## 2018-11-20 RX ADMIN — Medication 5 MILLIGRAM(S): at 17:32

## 2018-11-20 RX ADMIN — Medication 100 MILLIEQUIVALENT(S): at 10:50

## 2018-11-20 RX ADMIN — Medication 100 MILLIEQUIVALENT(S): at 11:45

## 2018-11-20 NOTE — PROGRESS NOTE ADULT - PROBLEM SELECTOR PLAN 6
- overnight team called by RN for blood in pts oropharynx likely 2/2 irritation/trauma from NGT. CBC resulted at 13.3, drop of 0.9 points.   - dried blood noted on pt's hard palate, no intervention at this time. continue to follow AM CBC.

## 2018-11-20 NOTE — PROGRESS NOTE ADULT - PROBLEM SELECTOR PLAN 2
- resolved, WBC 10.18, pt is afebrile  - pt without evidence for acute abdomen, denies pain. Abd NT, ND.   - continue to monitor, f/u AM CBC

## 2018-11-20 NOTE — PROGRESS NOTE ADULT - ASSESSMENT
Pt is a 89 y/o F with PMHx HTN, visual/auditory hallucinations, s/p cholecystectomy, s/p WILL, L inguinal hernia admitted to [facility] for conservative vs surgical management of SBO.

## 2018-11-20 NOTE — PROGRESS NOTE ADULT - SUBJECTIVE AND OBJECTIVE BOX
Patient is a 89y old  Female who presents with a chief complaint of Abdominal pain, vomiting (19 Nov 2018 11:12)      INTERVAL HPI/OVERNIGHT EVENTS:  T(C): 36.4 (11-20-18 @ 05:49), Max: 36.7 (11-19-18 @ 12:55)  HR: 80 (11-20-18 @ 05:49) (76 - 80)  BP: 148/63 (11-20-18 @ 05:49) (145/64 - 169/75)  RR: 18 (11-20-18 @ 05:49) (14 - 18)  SpO2: 93% (11-20-18 @ 05:49) (92% - 94%)  Wt(kg): --  I&O's Summary    19 Nov 2018 07:01  -  20 Nov 2018 07:00  --------------------------------------------------------  IN: 0 mL / OUT: 500 mL / NET: -500 mL    20 Nov 2018 07:01  -  20 Nov 2018 11:02  --------------------------------------------------------  IN: 0 mL / OUT: 700 mL / NET: -700 mL        LABS:                        13.3   10.18 )-----------( 160      ( 20 Nov 2018 06:27 )             38.8     11-20    145  |  108  |  21  ----------------------------<  81  3.4<L>   |  25  |  0.59    Ca    7.8<L>      20 Nov 2018 06:27  Phos  2.6     11-20  Mg     1.8     11-20    TPro  6.2  /  Alb  2.7<L>  /  TBili  1.0  /  DBili  x   /  AST  23  /  ALT  15  /  AlkPhos  37<L>  11-20        CAPILLARY BLOOD GLUCOSE                MEDICATIONS  (STANDING):  enoxaparin Injectable 40 milliGRAM(s) SubCutaneous daily  magnesium sulfate  IVPB 2 Gram(s) IV Intermittent once  metoprolol tartrate Injectable 5 milliGRAM(s) IV Push every 6 hours  potassium chloride  10 mEq/100 mL IVPB 10 milliEquivalent(s) IV Intermittent every 1 hour  sodium chloride 0.9% 1000 milliLiter(s) (75 mL/Hr) IV Continuous <Continuous>    MEDICATIONS  (PRN):  ondansetron Injectable 4 milliGRAM(s) IV Push every 6 hours PRN Nausea and/or Vomiting      REVIEW OF SYSTEMS:  CONSTITUTIONAL: No fever, weight loss, or fatigue  EYES: No eye pain, visual disturbances, or discharge  ENMT:  No difficulty hearing, tinnitus, vertigo; No sinus or throat pain  NECK: No pain or stiffness  RESPIRATORY: No cough, wheezing, chills or hemoptysis; No shortness of breath  CARDIOVASCULAR: No chest pain, palpitations, dizziness, or leg swelling  GASTROINTESTINAL: No abdominal or epigastric pain. No nausea, vomiting, or hematemesis; No diarrhea or constipation. No melena or hematochezia.  GENITOURINARY: No dysuria, frequency, hematuria, or incontinence  NEUROLOGICAL: No headaches, memory loss, loss of strength, numbness, or tremors  SKIN: No itching, burning, rashes, or lesions   MUSCULOSKELETAL: No joint pain or swelling; No muscle, back, or extremity pain  PSYCHIATRIC: No depression, anxiety, mood swings, or difficulty sleeping    RADIOLOGY & ADDITIONAL TESTS:    Imaging Personally Reviewed:  [ ] YES  [ ] NO    Consultant(s) Notes Reviewed:  [ ] YES  [ ] NO    PHYSICAL EXAM:  GENERAL: NAD, well-groomed, well-developed  HEAD:  Atraumatic, Normocephalic  EYES: EOMI, PERRLA, conjunctiva and sclera clear  ENMT: No tonsillar erythema, exudates, or enlargement; Moist mucous membranes, Good dentition, No lesions  NECK: Supple, No JVD, Normal thyroid  NERVOUS SYSTEM:  Alert & Oriented X3, Good concentration; Motor Strength 5/5 B/L upper and lower extremities; DTRs 2+ intact and symmetric  CHEST/LUNG: Clear to auscultation bilaterally; No rales, rhonchi, wheezing, or rubs  HEART: Regular rate and rhythm; No murmurs, rubs, or gallops  ABDOMEN: Soft, Nontender, Nondistended; Bowel sounds present  EXTREMITIES:  2+ Peripheral Pulses, No clubbing, cyanosis, or edema  LYMPH: No lymphadenopathy noted  SKIN: No rashes or lesions    Care Discussed with Consultants/Other Providers [ ] YES  [ ] NO Patient is a 89y old  Female who presents with a chief complaint of Abdominal pain, vomiting (19 Nov 2018 11:12)      INTERVAL HPI/OVERNIGHT EVENTS: Advised by overnight team that they were called by RN for blood in pt's oropharynx, and CBC was ordered. Upon exam this morning, dried clotted blood was apparent in hard palate likely from nasal trauma from NGT. Pt states she feels well, and not complaining of dizziness of weakness. CBC found to be 13.3. Pt states she slept well overnight, and denies abdominal pain. Admits to one BM overnight.     T(C): 36.4 (11-20-18 @ 05:49), Max: 36.7 (11-19-18 @ 12:55)  HR: 80 (11-20-18 @ 05:49) (76 - 80)  BP: 148/63 (11-20-18 @ 05:49) (145/64 - 169/75)  RR: 18 (11-20-18 @ 05:49) (14 - 18)  SpO2: 93% (11-20-18 @ 05:49) (92% - 94%)  Wt(kg): --  I&O's Summary    19 Nov 2018 07:01  -  20 Nov 2018 07:00  --------------------------------------------------------  IN: 0 mL / OUT: 500 mL / NET: -500 mL    20 Nov 2018 07:01  -  20 Nov 2018 11:02  --------------------------------------------------------  IN: 0 mL / OUT: 700 mL / NET: -700 mL        LABS:                        13.3   10.18 )-----------( 160      ( 20 Nov 2018 06:27 )             38.8     11-20    145  |  108  |  21  ----------------------------<  81  3.4<L>   |  25  |  0.59    Ca    7.8<L>      20 Nov 2018 06:27  Phos  2.6     11-20  Mg     1.8     11-20    TPro  6.2  /  Alb  2.7<L>  /  TBili  1.0  /  DBili  x   /  AST  23  /  ALT  15  /  AlkPhos  37<L>  11-20        CAPILLARY BLOOD GLUCOSE                MEDICATIONS  (STANDING):  enoxaparin Injectable 40 milliGRAM(s) SubCutaneous daily  magnesium sulfate  IVPB 2 Gram(s) IV Intermittent once  metoprolol tartrate Injectable 5 milliGRAM(s) IV Push every 6 hours  potassium chloride  10 mEq/100 mL IVPB 10 milliEquivalent(s) IV Intermittent every 1 hour  sodium chloride 0.9% 1000 milliLiter(s) (75 mL/Hr) IV Continuous <Continuous>    MEDICATIONS  (PRN):  ondansetron Injectable 4 milliGRAM(s) IV Push every 6 hours PRN Nausea and/or Vomiting      REVIEW OF SYSTEMS:  CONSTITUTIONAL: No fever, weight loss, or fatigue  EYES: No eye pain, visual disturbances, or discharge  ENMT:  No difficulty hearing, tinnitus, vertigo; (+) admits to dry mouth due to clotted blood.   NECK: No pain or stiffness  RESPIRATORY: No cough, wheezing, chills or hemoptysis; No shortness of breath  CARDIOVASCULAR: No chest pain, palpitations, dizziness, or leg swelling  GASTROINTESTINAL: No abdominal or epigastric pain. No nausea, vomiting, or hematemesis; No diarrhea or constipation. No melena or hematochezia.  GENITOURINARY: No dysuria, frequency, hematuria, or incontinence  NEUROLOGICAL: No headaches, memory loss, loss of strength, numbness, or tremors  SKIN: No itching, burning, rashes, or lesions   MUSCULOSKELETAL: No joint pain or swelling; (+) admits pain in B/L LE when palpated  PSYCHIATRIC: No depression, anxiety, mood swings, or difficulty sleeping    RADIOLOGY & ADDITIONAL TESTS:    Imaging Personally Reviewed:  [x ] YES  [ ] NO    Consultant(s) Notes Reviewed:  [x ] YES  [ ] NO    PHYSICAL EXAM:  GENERAL: elderly female in NAD  HEAD:  Atraumatic, Normocephalic  EYES: EOMI, PERRLA, conjunctiva and sclera clear  ENMT: No tonsillar erythema, exudates, or enlargement; Moist mucous membranes, dried blood present in hard palate. NGT present  NECK: Supple, No JVD, Normal thyroid  NERVOUS SYSTEM:  AAOx1 Good concentration; Motor Strength 5/5 B/L upper and lower extremities; DTRs 2+ intact and symmetric  CHEST/LUNG: Clear to auscultation bilaterally; No rales, rhonchi, wheezing, or rubs  HEART: Regular rate and rhythm; No murmurs, rubs, or gallops  ABDOMEN: Soft, Nontender, Nondistended; Bowel sounds present  EXTREMITIES:  2+ Peripheral Pulses, No clubbing, cyanosis, or edema. TTP B/L LE.  LYMPH: No lymphadenopathy noted  SKIN: No rashes or lesions    Care Discussed with Consultants/Other Providers [x ] YES  [ ] NO

## 2018-11-20 NOTE — PROGRESS NOTE ADULT - PROBLEM SELECTOR PLAN 1
- pt asymptomatic, nontender to palpation  - Abd Xray 11/19 showing Fluid filled loops of bowel with paucity of gas.   - Abd Xray 11/20 showing NG tube again noted. Nonspecific bowel gas pattern with fluid-filled bowel loops and Paucity of gas in the abdomen and pelvis without   significant change. No gross intraperitoneal free air.  - encourage OOB, ambulation as tolerated. Encouraged use of Incentive spirometer.  - surgery following (Dr. Fernandez), plan to clamp NGT. If low residuals, will discontinue NGT and start clears.  - c/w IVF NS @75cc/hr

## 2018-11-20 NOTE — PROGRESS NOTE ADULT - PROBLEM SELECTOR PLAN 4
- chronic, stable  - continue IV Lopressor 5mg q6h in lieu of cardizem. Continue w/ hold parameters.

## 2018-11-20 NOTE — PROGRESS NOTE ADULT - PROBLEM SELECTOR PLAN 3
- K 3.4, however pt asymptomatic. Repleted with 10 meq x3 doses. F/u AM CMP.  - Phos 2.6. Mag 1.8, repleted with 2g magnesium sulfate IVPB  - f/u AM CMP, Mag level

## 2018-11-20 NOTE — PROGRESS NOTE ADULT - SUBJECTIVE AND OBJECTIVE BOX
Subjective: Pt had a bowel movement, decreased tenderness.    Objective:  Vital Signs Last 24 Hrs  T(C): 36.8 (20 Nov 2018 11:06), Max: 36.8 (20 Nov 2018 11:06)  T(F): 98.3 (20 Nov 2018 11:06), Max: 98.3 (20 Nov 2018 11:06)  HR: 73 (20 Nov 2018 11:06) (73 - 80)  BP: 148/83 (20 Nov 2018 11:06) (145/64 - 169/75)  BP(mean): --  RR: 17 (20 Nov 2018 11:06) (14 - 18)  SpO2: 93% (20 Nov 2018 11:06) (92% - 94%)    Heent: LEONEL, FREOM  Pul: clear  Cor: RSR, without murmurs  Abdomen: normal bowel sounds, without distension, without tenderness.  Extremities: without edema, motor/sensory intact, without calf pain, Homans sign negative.                        13.3   10.18 )-----------( 160      ( 20 Nov 2018 06:27 )             38.8       11-20    145  |  108  |  21  ----------------------------<  81  3.4<L>   |  25  |  0.59    Ca    7.8<L>      20 Nov 2018 06:27  Phos  2.6     11-20  Mg     1.8     11-20    TPro  6.2  /  Alb  2.7<L>  /  TBili  1.0  /  DBili  x   /  AST  23  /  ALT  15  /  AlkPhos  37<L>  11-20 11-19 @ 07:01  -  11-20 @ 07:00  --------------------------------------------------------  IN:  Total IN: 0 mL    OUT:    Nasoenteral Tube: 500 mL  Total OUT: 500 mL    Total NET: -500 mL      11-20 @ 07:01  -  11-20 @ 11:41  --------------------------------------------------------  IN:  Total IN: 0 mL    OUT:    Nasoenteral Tube: 700 mL  Total OUT: 700 mL    Total NET: -700 mL    < from: Xray Abdomen 2 Views (11.20.18 @ 09:36) >  EXAM:  XR ABDOMEN 2V                            PROCEDURE DATE:  11/20/2018          INTERPRETATION:  CLINICAL STATEMENT: Small bowel obstruction    TECHNIQUE: Single view of the abdomen.    COMPARISON: 11/19/2018    FINDINGS/  IMPRESSION:  NG tubeagain noted. Nonspecific bowel gas pattern with fluid-filled   bowel loops and Paucity of gas in the abdomen and pelvis without   significant change.    No gross intraperitoneal free air.    Right upper quadrant surgical clips noted      < end of copied text >

## 2018-11-21 ENCOUNTER — TRANSCRIPTION ENCOUNTER (OUTPATIENT)
Age: 83
End: 2018-11-21

## 2018-11-21 DIAGNOSIS — F03.90 UNSPECIFIED DEMENTIA WITHOUT BEHAVIORAL DISTURBANCE: ICD-10-CM

## 2018-11-21 LAB
ANION GAP SERPL CALC-SCNC: 15 MMOL/L — SIGNIFICANT CHANGE UP (ref 5–17)
BUN SERPL-MCNC: 28 MG/DL — HIGH (ref 7–23)
CALCIUM SERPL-MCNC: 7.8 MG/DL — LOW (ref 8.5–10.1)
CHLORIDE SERPL-SCNC: 107 MMOL/L — SIGNIFICANT CHANGE UP (ref 96–108)
CO2 SERPL-SCNC: 21 MMOL/L — LOW (ref 22–31)
CREAT SERPL-MCNC: 0.63 MG/DL — SIGNIFICANT CHANGE UP (ref 0.5–1.3)
GLUCOSE SERPL-MCNC: 137 MG/DL — HIGH (ref 70–99)
HCT VFR BLD CALC: 41.5 % — SIGNIFICANT CHANGE UP (ref 34.5–45)
HGB BLD-MCNC: 14.1 G/DL — SIGNIFICANT CHANGE UP (ref 11.5–15.5)
MAGNESIUM SERPL-MCNC: 1.9 MG/DL — SIGNIFICANT CHANGE UP (ref 1.6–2.6)
MCHC RBC-ENTMCNC: 31.1 PG — SIGNIFICANT CHANGE UP (ref 27–34)
MCHC RBC-ENTMCNC: 34 GM/DL — SIGNIFICANT CHANGE UP (ref 32–36)
MCV RBC AUTO: 91.6 FL — SIGNIFICANT CHANGE UP (ref 80–100)
NRBC # BLD: 0 /100 WBCS — SIGNIFICANT CHANGE UP (ref 0–0)
PHOSPHATE SERPL-MCNC: 2.7 MG/DL — SIGNIFICANT CHANGE UP (ref 2.5–4.5)
PLATELET # BLD AUTO: 166 K/UL — SIGNIFICANT CHANGE UP (ref 150–400)
POTASSIUM SERPL-MCNC: 3.8 MMOL/L — SIGNIFICANT CHANGE UP (ref 3.5–5.3)
POTASSIUM SERPL-SCNC: 3.8 MMOL/L — SIGNIFICANT CHANGE UP (ref 3.5–5.3)
RBC # BLD: 4.53 M/UL — SIGNIFICANT CHANGE UP (ref 3.8–5.2)
RBC # FLD: 12.1 % — SIGNIFICANT CHANGE UP (ref 10.3–14.5)
SODIUM SERPL-SCNC: 143 MMOL/L — SIGNIFICANT CHANGE UP (ref 135–145)
WBC # BLD: 14.05 K/UL — HIGH (ref 3.8–10.5)
WBC # FLD AUTO: 14.05 K/UL — HIGH (ref 3.8–10.5)

## 2018-11-21 PROCEDURE — 71045 X-RAY EXAM CHEST 1 VIEW: CPT | Mod: 26

## 2018-11-21 PROCEDURE — 74018 RADEX ABDOMEN 1 VIEW: CPT | Mod: 26

## 2018-11-21 RX ORDER — HALOPERIDOL DECANOATE 100 MG/ML
0.5 INJECTION INTRAMUSCULAR EVERY 6 HOURS
Qty: 0 | Refills: 0 | Status: DISCONTINUED | OUTPATIENT
Start: 2018-11-21 | End: 2018-11-30

## 2018-11-21 RX ADMIN — Medication 5 MILLIGRAM(S): at 17:16

## 2018-11-21 RX ADMIN — SODIUM CHLORIDE 75 MILLILITER(S): 9 INJECTION, SOLUTION INTRAVENOUS at 23:20

## 2018-11-21 RX ADMIN — ENOXAPARIN SODIUM 40 MILLIGRAM(S): 100 INJECTION SUBCUTANEOUS at 12:25

## 2018-11-21 RX ADMIN — Medication 5 MILLIGRAM(S): at 12:49

## 2018-11-21 RX ADMIN — ONDANSETRON 4 MILLIGRAM(S): 8 TABLET, FILM COATED ORAL at 03:34

## 2018-11-21 NOTE — DISCHARGE NOTE ADULT - ADDITIONAL INSTRUCTIONS
-Please follow up with your primary doctor within one week.  -Please follow up with surgeon outpatient (information below).  -Patient and family to set up follow up appointments.  -Continue taking your medications as directed above.  -If symptoms persist/worsen, please call your PMD or return to the ED.

## 2018-11-21 NOTE — DISCHARGE NOTE ADULT - MEDICATION SUMMARY - MEDICATIONS TO STOP TAKING
I will STOP taking the medications listed below when I get home from the hospital:    Cardizem 120 mg oral tablet  -- orally once a day

## 2018-11-21 NOTE — DISCHARGE NOTE ADULT - PATIENT PORTAL LINK FT
You can access the [facility] Patient Portal, offered by [facility], by registering with the following website: [email or web address]

## 2018-11-21 NOTE — PROGRESS NOTE ADULT - SUBJECTIVE AND OBJECTIVE BOX
pt seen  ngt removed  multiple episodes of vomiting since  -fr-  ICU Vital Signs Last 24 Hrs  T(C): 36.8 (20 Nov 2018 20:36), Max: 37.3 (20 Nov 2018 13:22)  T(F): 98.2 (20 Nov 2018 20:36), Max: 99.1 (20 Nov 2018 13:22)  HR: 87 (20 Nov 2018 20:36) (65 - 87)  BP: 151/91 (20 Nov 2018 20:36) (144/71 - 151/91)  BP(mean): --  ABP: --  ABP(mean): --  RR: 18 (20 Nov 2018 20:36) (17 - 18)  SpO2: 93% (20 Nov 2018 20:36) (93% - 94%)  gen-NAD  resp-clear  abd-soft ND, tender epigastric area                          14.1   14.05 )-----------( 166      ( 21 Nov 2018 08:34 )             41.5   11-21    143  |  107  |  28<H>  ----------------------------<  137<H>  3.8   |  21<L>  |  0.63    Ca    7.8<L>      21 Nov 2018 08:33  Phos  2.7     11-21  Mg     1.9     11-21    TPro  6.2  /  Alb  2.7<L>  /  TBili  1.0  /  DBili  x   /  AST  23  /  ALT  15  /  AlkPhos  37<L>  11-20

## 2018-11-21 NOTE — CHART NOTE - NSCHARTNOTEFT_GEN_A_CORE
Pt is a 89 y/o F with PMHx HTN, visual/auditory hallucinations, s/p cholecystectomy, s/p WILL, L inguinal hernia admitted to [facility] for conservative vs surgical management of SBO. Called by RN for episodes of bilious vomiting. Patient NG tube was removed on 11/20 and patient was initiated on clear liquid diet. Patient seen and examined at bedside. Endorses minimal lower abdominal pain. Denies any other complaints.   As per nurse patient had 1 bowel movement today.       Vital Signs Last 24 Hrs  T(C): 36.8 (20 Nov 2018 20:36), Max: 37.3 (20 Nov 2018 13:22)  T(F): 98.2 (20 Nov 2018 20:36), Max: 99.1 (20 Nov 2018 13:22)  HR: 87 (20 Nov 2018 20:36) (65 - 87)  BP: 151/91 (20 Nov 2018 20:36) (144/71 - 151/91)  BP(mean): --  RR: 18 (20 Nov 2018 20:36) (17 - 18)  SpO2: 93% (20 Nov 2018 20:36) (93% - 94%)    PHYSICAL EXAM:  GENERAL: elderly female in NAD  HEAD:  Atraumatic, Normocephalic  CHEST/LUNG: Clear to auscultation bilaterally; No rales, rhonchi, wheezing, or rubs  HEART: Regular rate and rhythm; No murmurs, rubs, or gallops  ABDOMEN: Soft, minimal tenderness to lower abdomen, non-distended, hypoactive BS   EXTREMITIES: No clubbing, cyanosis, or edema.       A/P:  Pt is a 89 y/o F with PMHx HTN, visual/auditory hallucinations, s/p cholecystectomy, s/p WILL, L inguinal hernia admitted to [facility] for conservative vs surgical management of SBO. Called by RN for bilious vomiting. Patient not tolerating diet, SBO vs ileus.    -Uncooperative with NG tube at this time, will place if another episode of emesis   -NPO  -Abdominal X-ray  -RN to notify if any changes   -Senior present at bedside Pt is a 89 y/o F with PMHx HTN, visual/auditory hallucinations, s/p cholecystectomy, s/p WILL, L inguinal hernia admitted to [facility] for conservative vs surgical management of SBO. Called by RN for episodes of bilious vomiting. Patient NG tube was removed on 11/20 and patient was initiated on clear liquid diet. Patient seen and examined at bedside. Endorses minimal lower abdominal pain. Denies any other complaints.   As per nurse patient had 1 bowel movement today.       Vital Signs Last 24 Hrs  T(C): 36.8 (20 Nov 2018 20:36), Max: 37.3 (20 Nov 2018 13:22)  T(F): 98.2 (20 Nov 2018 20:36), Max: 99.1 (20 Nov 2018 13:22)  HR: 87 (20 Nov 2018 20:36) (65 - 87)  BP: 151/91 (20 Nov 2018 20:36) (144/71 - 151/91)  BP(mean): --  RR: 18 (20 Nov 2018 20:36) (17 - 18)  SpO2: 93% (20 Nov 2018 20:36) (93% - 94%)    PHYSICAL EXAM:  GENERAL: elderly female in NAD  HEAD:  Atraumatic, Normocephalic  CHEST/LUNG: Clear to auscultation bilaterally; No rales, rhonchi, wheezing, or rubs  HEART: Regular rate and rhythm; No murmurs, rubs, or gallops  ABDOMEN: Soft, minimal tenderness to lower abdomen, non-distended, hypoactive BS   EXTREMITIES: No clubbing, cyanosis, or edema.       A/P:  Pt is a 89 y/o F with PMHx HTN, visual/auditory hallucinations, s/p cholecystectomy, s/p WILL, L inguinal hernia admitted to [facility] for conservative vs surgical management of SBO. Called by RN for bilious vomiting. Patient not tolerating diet, SBO vs ileus.    -Uncooperative with NG tube at this time, will place if another episode of emesis   -NPO  -Abdominal X-ray  -RN to notify if any changes   -Senior present at bedside    Addendum: Patient now confused, has not vomited, reports no pain, refusing Abdominal X-ray and NG tube.

## 2018-11-21 NOTE — DISCHARGE NOTE ADULT - PLAN OF CARE
resolution You came in with abdominal pain and vomiting. We performed a CAT scan of your abdomen which showed an obstruction in your small bowel. We gave you fluids to keep you hydrated, monitored your electrolytes, and placed a tube into your stomach to keep it decompressed. Be sure to return to the ED if you have any worsening abdominal pain or further vomiting. Be sure to also follow up with your PCP in one week. stable You have high blood pressure. Continue taking your home Cardizem as prescribed. Be sure to eat a low salt diet. You were found to have low potassium, likely due to vomiting. We repleted your electrolytes and monitored them. You had an elevation of your white blood cells likely due to stress and/or pain. Be sure to return to the ED if you have any worsening abdominal pain or further vomiting. You have a history of left inguinal hernia. Continue to follow up with your PCP as an outpatient and return to the ED with any signs of worsening abdominal pain or vomiting. You were found to have an injury to your Right pinky finger. Xrays did not show any signs of fracture, however it showed signs of demineralization. We gave you calcium and vitamin D supplements. Continue taking your vitamins regularly. You were found to have low blood pressure during your stay here. STOP taking your home Cardizem, as this is no longer needed. START taking midodrine as prescribed. Be sure to follow up with your primary care physician in one week to recheck your blood pressure.

## 2018-11-21 NOTE — DIETITIAN INITIAL EVALUATION ADULT. - OTHER INFO
89 yo female admitted for SBO, remains NPO.  NGT out. Pt had episode of vomiting overnight. AXR pending.  Lives with son, has alternating constipation and diarrhea at times. Takes Miralax at home when constipated per MD note.

## 2018-11-21 NOTE — PROGRESS NOTE ADULT - PROBLEM SELECTOR PLAN 3
pt with hx of sundowning, agitation likely 2/2 worsening dementia  - refusing interventions, imaging, etc at nighttime, however is amenable to most interventions during day.   - evaluated by neuro (Dr. Guzmán), recommends Haldol 0.5mg IM q6h PRN for agitation.

## 2018-11-21 NOTE — PROGRESS NOTE ADULT - ASSESSMENT
89 yo admitted with sbo.  NGT removed but still nausea with vomiting     Abd xray     probably needs ngt reinserted, pt refusing at this time

## 2018-11-21 NOTE — PROGRESS NOTE ADULT - SUBJECTIVE AND OBJECTIVE BOX
Patient is a 89y old  Female who presents with a chief complaint of Abdominal pain, vomiting (20 Nov 2018 11:41)      INTERVAL HPI/OVERNIGHT EVENTS:  T(C): 36.8 (11-20-18 @ 20:36), Max: 37.3 (11-20-18 @ 13:22)  HR: 87 (11-20-18 @ 20:36) (65 - 87)  BP: 151/91 (11-20-18 @ 20:36) (144/71 - 151/91)  RR: 18 (11-20-18 @ 20:36) (17 - 18)  SpO2: 93% (11-20-18 @ 20:36) (93% - 94%)  Wt(kg): --  I&O's Summary    20 Nov 2018 07:01  -  21 Nov 2018 07:00  --------------------------------------------------------  IN: 0 mL / OUT: 700 mL / NET: -700 mL        LABS:                        14.1   14.05 )-----------( 166      ( 21 Nov 2018 08:34 )             41.5     11-21    143  |  107  |  28<H>  ----------------------------<  137<H>  3.8   |  21<L>  |  0.63    Ca    7.8<L>      21 Nov 2018 08:33  Phos  2.7     11-21  Mg     1.9     11-21    TPro  6.2  /  Alb  2.7<L>  /  TBili  1.0  /  DBili  x   /  AST  23  /  ALT  15  /  AlkPhos  37<L>  11-20        CAPILLARY BLOOD GLUCOSE                MEDICATIONS  (STANDING):  enoxaparin Injectable 40 milliGRAM(s) SubCutaneous daily  metoprolol tartrate Injectable 5 milliGRAM(s) IV Push every 6 hours  sodium chloride 0.9% 1000 milliLiter(s) (75 mL/Hr) IV Continuous <Continuous>    MEDICATIONS  (PRN):  ondansetron Injectable 4 milliGRAM(s) IV Push every 6 hours PRN Nausea and/or Vomiting      REVIEW OF SYSTEMS:  CONSTITUTIONAL: No fever, weight loss, or fatigue  EYES: No eye pain, visual disturbances, or discharge  ENMT:  No difficulty hearing, tinnitus, vertigo; No sinus or throat pain  NECK: No pain or stiffness  RESPIRATORY: No cough, wheezing, chills or hemoptysis; No shortness of breath  CARDIOVASCULAR: No chest pain, palpitations, dizziness, or leg swelling  GASTROINTESTINAL: No abdominal or epigastric pain. No nausea, vomiting, or hematemesis; No diarrhea or constipation. No melena or hematochezia.  GENITOURINARY: No dysuria, frequency, hematuria, or incontinence  NEUROLOGICAL: No headaches, memory loss, loss of strength, numbness, or tremors  SKIN: No itching, burning, rashes, or lesions   MUSCULOSKELETAL: No joint pain or swelling; No muscle, back, or extremity pain  PSYCHIATRIC: No depression, anxiety, mood swings, or difficulty sleeping    RADIOLOGY & ADDITIONAL TESTS:    Imaging Personally Reviewed:  [ ] YES  [ ] NO    Consultant(s) Notes Reviewed:  [ ] YES  [ ] NO    PHYSICAL EXAM:  GENERAL: NAD, well-groomed, well-developed  HEAD:  Atraumatic, Normocephalic  EYES: EOMI, PERRLA, conjunctiva and sclera clear  ENMT: No tonsillar erythema, exudates, or enlargement; Moist mucous membranes, Good dentition, No lesions  NECK: Supple, No JVD, Normal thyroid  NERVOUS SYSTEM:  Alert & Oriented X3, Good concentration; Motor Strength 5/5 B/L upper and lower extremities; DTRs 2+ intact and symmetric  CHEST/LUNG: Clear to auscultation bilaterally; No rales, rhonchi, wheezing, or rubs  HEART: Regular rate and rhythm; No murmurs, rubs, or gallops  ABDOMEN: Soft, Nontender, Nondistended; Bowel sounds present  EXTREMITIES:  2+ Peripheral Pulses, No clubbing, cyanosis, or edema  LYMPH: No lymphadenopathy noted  SKIN: No rashes or lesions    Care Discussed with Consultants/Other Providers [ ] YES  [ ] NO Patient is a 89y old  Female who presents with a chief complaint of Abdominal pain, vomiting (20 Nov 2018 11:41)      INTERVAL HPI/OVERNIGHT EVENTS: Pt seen and examined at bedside. As per signout, overnight team called by RN for one episode bilious vomiting. As per nursing, pt had several more episodes in AM. Pt not complaining of abd pain at this time. Denies fever, chills.    T(C): 36.8 (11-20-18 @ 20:36), Max: 37.3 (11-20-18 @ 13:22)  HR: 87 (11-20-18 @ 20:36) (65 - 87)  BP: 151/91 (11-20-18 @ 20:36) (144/71 - 151/91)  RR: 18 (11-20-18 @ 20:36) (17 - 18)  SpO2: 93% (11-20-18 @ 20:36) (93% - 94%)  Wt(kg): --  I&O's Summary    20 Nov 2018 07:01  -  21 Nov 2018 07:00  --------------------------------------------------------  IN: 0 mL / OUT: 700 mL / NET: -700 mL        LABS:                        14.1   14.05 )-----------( 166      ( 21 Nov 2018 08:34 )             41.5     11-21    143  |  107  |  28<H>  ----------------------------<  137<H>  3.8   |  21<L>  |  0.63    Ca    7.8<L>      21 Nov 2018 08:33  Phos  2.7     11-21  Mg     1.9     11-21    TPro  6.2  /  Alb  2.7<L>  /  TBili  1.0  /  DBili  x   /  AST  23  /  ALT  15  /  AlkPhos  37<L>  11-20        CAPILLARY BLOOD GLUCOSE                MEDICATIONS  (STANDING):  enoxaparin Injectable 40 milliGRAM(s) SubCutaneous daily  metoprolol tartrate Injectable 5 milliGRAM(s) IV Push every 6 hours  sodium chloride 0.9% 1000 milliLiter(s) (75 mL/Hr) IV Continuous <Continuous>    MEDICATIONS  (PRN):  ondansetron Injectable 4 milliGRAM(s) IV Push every 6 hours PRN Nausea and/or Vomiting      REVIEW OF SYSTEMS:  CONSTITUTIONAL: No fever, weight loss, or fatigue  EYES: No eye pain, visual disturbances, or discharge  ENMT:  No difficulty hearing, tinnitus, vertigo; No sinus or throat pain  NECK: No pain or stiffness  RESPIRATORY: No cough, wheezing, chills or hemoptysis; No shortness of breath  CARDIOVASCULAR: No chest pain, palpitations, dizziness, or leg swelling  GASTROINTESTINAL: No abdominal or epigastric pain. No nausea, (+) vomiting, or hematemesis; No diarrhea or constipation. No melena or hematochezia.  GENITOURINARY: No dysuria, frequency, hematuria, or incontinence  NEUROLOGICAL: No headaches, memory loss, loss of strength, numbness, or tremors  SKIN: No itching, burning, rashes, or lesions   MUSCULOSKELETAL: (+) B/L LE pain No muscle, back, or extremity pain  PSYCHIATRIC: No depression, anxiety, mood swings, or difficulty sleeping    RADIOLOGY & ADDITIONAL TESTS:    Imaging Personally Reviewed:  [x ] YES  [ ] NO    Consultant(s) Notes Reviewed:  [x ] YES  [ ] NO    PHYSICAL EXAM:  GENERAL: elderly female in NAD  HEAD:  Atraumatic, Normocephalic  EYES: EOMI, PERRLA, conjunctiva and sclera clear  ENMT: No tonsillar erythema, exudates, or enlargement; Moist mucous membranes,  NECK: Supple, No JVD, Normal thyroid  NERVOUS SYSTEM:  AAOx1 Good concentration; Motor Strength 5/5 B/L upper and lower extremities; DTRs 2+ intact and symmetric  CHEST/LUNG: Clear to auscultation bilaterally; No rales, rhonchi, wheezing, or rubs  HEART: Regular rate and rhythm; No murmurs, rubs, or gallops  ABDOMEN: Soft, Nontender, Nondistended; Bowel sounds present  EXTREMITIES:  2+ Peripheral Pulses, No clubbing, cyanosis, or edema. TTP B/L LE.  LYMPH: No lymphadenopathy noted  SKIN: No rashes or lesions    Care Discussed with Consultants/Other Providers [x ] YES  [ ] NO

## 2018-11-21 NOTE — PROGRESS NOTE ADULT - SUBJECTIVE AND OBJECTIVE BOX
neuro cons dict.  AMS , DOUBT CVA.  LIKELY WORSENING OF DEMENTIA  DUE TO CURRENT ILLNESS  HALDOL . 5 MG IM Q 6HR PRN FOR AGITATION.  DW PATIENT DAUGHTER.

## 2018-11-21 NOTE — PROGRESS NOTE ADULT - PROBLEM SELECTOR PLAN 1
- pt nontender to palpation, now with bilious vomiting  - Abd Xray now showing overall mild increased density, could represent distended small bowel loops with previously administered diluted enteric contrast. Cannot exclude SBO.  - encourage OOB, ambulation as tolerated. Encouraged use of Incentive spirometer.  - surgery following (Dr. Mauricio), recommends NGT, however pt refusing. Will attempt again during day, as pt has episodes of sundowning at night.  - c/w IVF NS @75cc/hr

## 2018-11-21 NOTE — DISCHARGE NOTE ADULT - NSTOBACCOHOTLINE_GEN_A_CS
[facility] Smokers Quitline ([phone]) [facility] Smokers Quitline ([phone])

## 2018-11-21 NOTE — DISCHARGE NOTE ADULT - CARE PROVIDER_API CALL
Clyde Fernandez), Surgery  [address] [place] [address]  Phone: [phone]  Fax: [phone] Clyde Fernandez), Surgery  [address]  [place] [address]  Phone: [phone]  Fax: [phone]    Obed Mckeon), Cardiovascular Disease; Internal Medicine  [address] [facility]  [address] [place] [address]  Phone: [phone]  Fax: [phone]

## 2018-11-21 NOTE — DISCHARGE NOTE ADULT - CARE PLAN
Principal Discharge DX:	SBO (small bowel obstruction)  Goal:	resolution  Assessment and plan of treatment:	You came in with abdominal pain and vomiting. We performed a CAT scan of your abdomen which showed an obstruction in your small bowel. We gave you fluids to keep you hydrated, monitored your electrolytes, and placed a tube into your stomach to keep it decompressed. Be sure to return to the ED if you have any worsening abdominal pain or further vomiting. Be sure to also follow up with your PCP in one week.  Secondary Diagnosis:	HTN (hypertension)  Goal:	stable  Assessment and plan of treatment:	You have high blood pressure. Continue taking your home Cardizem as prescribed. Be sure to eat a low salt diet.  Secondary Diagnosis:	Hypokalemia  Goal:	resolution  Assessment and plan of treatment:	You were found to have low potassium, likely due to vomiting. We repleted your electrolytes and monitored them.  Secondary Diagnosis:	Leukocytosis  Goal:	resolution  Assessment and plan of treatment:	You had an elevation of your white blood cells likely due to stress and/or pain. Be sure to return to the ED if you have any worsening abdominal pain or further vomiting.  Secondary Diagnosis:	Left inguinal hernia  Goal:	stable  Assessment and plan of treatment:	You have a history of left inguinal hernia. Continue to follow up with your PCP as an outpatient and return to the ED with any signs of worsening abdominal pain or vomiting. Principal Discharge DX:	SBO (small bowel obstruction)  Goal:	resolution  Assessment and plan of treatment:	You came in with abdominal pain and vomiting. We performed a CAT scan of your abdomen which showed an obstruction in your small bowel. We gave you fluids to keep you hydrated, monitored your electrolytes, and placed a tube into your stomach to keep it decompressed. Be sure to return to the ED if you have any worsening abdominal pain or further vomiting. Be sure to also follow up with your PCP in one week.  Secondary Diagnosis:	HTN (hypertension)  Goal:	stable  Assessment and plan of treatment:	You have high blood pressure. Continue taking your home Cardizem as prescribed. Be sure to eat a low salt diet.  Secondary Diagnosis:	Hypokalemia  Goal:	resolution  Assessment and plan of treatment:	You were found to have low potassium, likely due to vomiting. We repleted your electrolytes and monitored them.  Secondary Diagnosis:	Leukocytosis  Goal:	resolution  Assessment and plan of treatment:	You had an elevation of your white blood cells likely due to stress and/or pain. Be sure to return to the ED if you have any worsening abdominal pain or further vomiting.  Secondary Diagnosis:	Left inguinal hernia  Goal:	stable  Assessment and plan of treatment:	You have a history of left inguinal hernia. Continue to follow up with your PCP as an outpatient and return to the ED with any signs of worsening abdominal pain or vomiting.  Secondary Diagnosis:	Hand injury  Goal:	resolution  Assessment and plan of treatment:	You were found to have an injury to your Right pinky finger. Xrays did not show any signs of fracture, however it showed signs of demineralization. We gave you calcium and vitamin D supplements. Continue taking your vitamins regularly. Principal Discharge DX:	SBO (small bowel obstruction)  Goal:	resolution  Assessment and plan of treatment:	You came in with abdominal pain and vomiting. We performed a CAT scan of your abdomen which showed an obstruction in your small bowel. We gave you fluids to keep you hydrated, monitored your electrolytes, and placed a tube into your stomach to keep it decompressed. Be sure to return to the ED if you have any worsening abdominal pain or further vomiting. Be sure to also follow up with your PCP in one week.  Secondary Diagnosis:	Hypokalemia  Goal:	stable  Assessment and plan of treatment:	You were found to have low potassium, likely due to vomiting. We repleted your electrolytes and monitored them.  Secondary Diagnosis:	Leukocytosis  Goal:	resolution  Assessment and plan of treatment:	You had an elevation of your white blood cells likely due to stress and/or pain. Be sure to return to the ED if you have any worsening abdominal pain or further vomiting.  Secondary Diagnosis:	Left inguinal hernia  Goal:	resolution  Assessment and plan of treatment:	You have a history of left inguinal hernia. Continue to follow up with your PCP as an outpatient and return to the ED with any signs of worsening abdominal pain or vomiting.  Secondary Diagnosis:	Hand injury  Goal:	stable  Assessment and plan of treatment:	You were found to have an injury to your Right pinky finger. Xrays did not show any signs of fracture, however it showed signs of demineralization. We gave you calcium and vitamin D supplements. Continue taking your vitamins regularly.  Secondary Diagnosis:	Hypotension  Goal:	resolution  Assessment and plan of treatment:	You were found to have low blood pressure during your stay here. STOP taking your home Cardizem, as this is no longer needed. START taking midodrine as prescribed. Be sure to follow up with your primary care physician in one week to recheck your blood pressure.

## 2018-11-21 NOTE — DIETITIAN INITIAL EVALUATION ADULT. - NS AS NUTRI INTERV MEALS SNACK
NPO at present. Advance when medically feasible to clears to Low residue/fiber as tolerated. Pt likely not candidate for diet instruction 2/2 confusion, if family available will need to provide to them.

## 2018-11-21 NOTE — CHART NOTE - NSCHARTNOTEFT_GEN_A_CORE
Called by RN due to bilious vomiting. Patient seen and examined at bedside, now agreeable to NGT with persuasion of daughter present at bedside. No BM today, with persistent n/v. NGT was placed in right nare without difficulty and with immediate return of bilious content. Patient tolerated procedure well but expresses discomfort with the ng tube in general.    Vital Signs Last 24 Hrs  T(C): 36.7 (21 Nov 2018 20:31), Max: 36.7 (21 Nov 2018 20:31)  T(F): 98.1 (21 Nov 2018 20:31), Max: 98.1 (21 Nov 2018 20:31)  HR: 76 (21 Nov 2018 20:31) (76 - 76)  BP: 152/68 (21 Nov 2018 20:31) (152/68 - 167/73)  BP(mean): --  RR: 18 (21 Nov 2018 20:31) (16 - 18)  SpO2: 91% (21 Nov 2018 20:31) (90% - 91%)    Physical Exam:  Gen: nad recent emesis  Cardio: S1S2 RRR  Pulm: clear anteriorly  Abd: soft, nondistended, minimally tender, +bowel sounds in all 4 quadrants    Impression: SBO  -NGT to low intermittent suction per surgery recs  -CXR ordered to confirm placement, in satisfactory position  -continue to monitor

## 2018-11-21 NOTE — DISCHARGE NOTE ADULT - HOSPITAL COURSE
Pt is a 89 YO F PMHx HTN, visual/auditory hallucinations, s/p cholecystectomy, s/p WILL, L inguinal hernia who presented to ED with chief complaint of abdominal pain and vomiting. Patient stated that several days ago, she began having abdominal pain. This pain resolved, patient was able to eat and have BMs. Later that day, after eating dinner around 6 pm, patient began having worsening abdominal pain, nausea and vomiting. Stated she vomited 3-4 times with brownish liquid consistency, NBNB. Patient endorsed BM that morning. She denied recent change in stool caliber or consistency, no melena, no hematochezia, no fever/chills. Denied recent travel or change in diet. Of note, patient stated she does have issues with chronic constipation and sometimes diarrhea, questionable history of IBS. Patient takes miralax almost daily when constipated. Denied HAs, CP, SOB. Patient mentioned that she has L inguinal hernia, has been seen by Dr. Fernandez for it, no intervention to date. In the ED, vitals T 97.6F, HR 84, /83, RR 19, 97% on RA. CBC normal WBC with left shift 86.4% neutrophils and CMP WNL. UA showed small ketones, small blood, 3-5 RBCs, few bacteria. CT Abdomen and Pelvis showed Small bowel obstruction with the transition point in the left mid abdomen near the abdominal wall. Bowel containing left inguinal hernia without strangulation or incarceration. EKG: NSR, nonspecific S wave and T wave abnormalities. Given IV zofran 4mg, IV tylenol x 1 and 1L NS bolus. Pt was seen by surgery, NGT placed to suction. Pt found to sundown at night with attempts to remove NGT. Pt at this time denied abd pain. The next day, rectal suppository was given with successful BM overnight. NGT was then clamped with low residuals noted, so NGT was removed. Later that night, overnight team called by RN for episodes of bilious vomiting. Pt refused reinsertion of NGT and abdominal imaging overnight. Following AM, pt amenable to abd xray but continued refusing NGT. Xray showed signs of SBO, pt remained NPO, with plan for eventual NGT if vomiting persisted.     Patient improved clinically throughout hospital course. Patient seen and examined on day of discharge.    Vital Signs Last 24 Hrs  T(C): 32.2 (21 Nov 2018 13:28), Max: 37.1 (20 Nov 2018 17:30)  T(F): 90 (21 Nov 2018 13:28), Max: 98.7 (20 Nov 2018 17:30)  HR: 76 (21 Nov 2018 13:28) (65 - 87)  BP: 167/73 (21 Nov 2018 13:28) (144/71 - 167/73)  BP(mean): --  RR: 16 (21 Nov 2018 13:28) (16 - 18)  SpO2: 90% (21 Nov 2018 13:28) (90% - 94%)    Physical Exam:  General: Well developed, well nourished, NAD  HEENT: NCAT, PERRLA, EOMI bl, moist mucous membranes   Neck: Supple, nontender, no mass  Neurology: A&Ox3, nonfocal, CN II-XII grossly intact, sensation intact, no gait abnormalities   Respiratory: CTA B/L, No W/R/R  CV: RRR, +S1/S2, no murmurs, rubs or gallops  Abdominal: Soft, NT, ND +BSx4  Extremities: No C/C/E, + peripheral pulses  MSK: Normal ROM, no joint erythema or warmth, no joint swelling   Skin: warm, dry, normal color, no obvious rash or abnormal lesions    Patient is medically stable for discharge to ____ with outpatient follow up. Pt is a 89 YO F PMHx HTN, visual/auditory hallucinations, s/p cholecystectomy, s/p WILL, L inguinal hernia who presented to ED with chief complaint of abdominal pain and vomiting. Patient stated that several days ago, she began having abdominal pain. This pain resolved, patient was able to eat and have BMs. Later that day, after eating dinner around 6 pm, patient began having worsening abdominal pain, nausea and vomiting. Stated she vomited 3-4 times with brownish liquid consistency, NBNB. Patient endorsed BM that morning. She denied recent change in stool caliber or consistency, no melena, no hematochezia, no fever/chills. Denied recent travel or change in diet. Of note, patient stated she does have issues with chronic constipation and sometimes diarrhea, questionable history of IBS. Patient takes miralax almost daily when constipated. Denied HAs, CP, SOB. Patient mentioned that she has L inguinal hernia, has been seen by Dr. Fernandez for it, no intervention to date. In the ED, vitals T 97.6F, HR 84, /83, RR 19, 97% on RA. CBC normal WBC with left shift 86.4% neutrophils and CMP WNL. UA showed small ketones, small blood, 3-5 RBCs, few bacteria. CT Abdomen and Pelvis showed Small bowel obstruction with the transition point in the left mid abdomen near the abdominal wall. Bowel containing left inguinal hernia without strangulation or incarceration. EKG: NSR, nonspecific S wave and T wave abnormalities. Given IV zofran 4mg, IV tylenol x 1 and 1L NS bolus. Pt was seen by surgery, NGT placed to suction. Pt found to sundown at night with attempts to remove NGT, and was seen by neurology who recommended Haldol PRN for agitation. Pt at this time denied abd pain. The next day, rectal suppository was given with successful BM overnight. NGT was then clamped with low residuals noted, so NGT was removed. Later that night, overnight team called by RN for episodes of bilious vomiting. Pt refused reinsertion of NGT and abdominal imaging overnight. Following AM, pt amenable to abd xray but continued refusing NGT. Xray showed signs of SBO, pt remained NPO, with plan for eventual NGT if vomiting persisted. Later that night pt accepted NGT which showed immediate drainage of bilious fluid. Pt remained on NGT, kept NPO with IVF with plan for possible surgical intervention Monday if SBO unresolved.     Patient improved clinically throughout hospital course. Patient seen and examined on day of discharge.    Vital Signs Last 24 Hrs  T(C): 36.7 (23 Nov 2018 05:42), Max: 37.4 (22 Nov 2018 14:05)  T(F): 98 (23 Nov 2018 05:42), Max: 99.3 (22 Nov 2018 14:05)  HR: 71 (23 Nov 2018 05:42) (69 - 71)  BP: 153/62 (23 Nov 2018 05:42) (153/62 - 165/76)  BP(mean): --  RR: 17 (23 Nov 2018 05:42) (16 - 17)  SpO2: 93% (23 Nov 2018 05:42) (90% - 93%)    Physical Exam:  General: elderly female NAD  HEENT: NCAT, PERRLA, EOMI bl, moist mucous membranes.   Neck: Supple, nontender, no mass  Neurology: A&Ox3, nonfocal, CN II-XII grossly intact, sensation intact, no gait abnormalities   Respiratory: CTA B/L, No W/R/R  CV: RRR, +S1/S2, no murmurs, rubs or gallops  Abdominal: Soft, NT, ND +BSx4  Extremities: No C/C/E, + peripheral pulses  MSK: Normal ROM, no joint erythema or warmth, no joint swelling   Skin: warm, dry, normal color, no obvious rash or abnormal lesions    Patient is medically stable for discharge to ____ with outpatient follow up. Pt is a 89 YO F PMHx HTN, visual/auditory hallucinations, s/p cholecystectomy, s/p WILL, L inguinal hernia who presented to ED with chief complaint of abdominal pain and vomiting. Patient stated that several days ago, she began having abdominal pain. This pain resolved, patient was able to eat and have BMs. Later that day, after eating dinner around 6 pm, patient began having worsening abdominal pain, nausea and vomiting. Stated she vomited 3-4 times with brownish liquid consistency, NBNB. Patient endorsed BM that morning. She denied recent change in stool caliber or consistency, no melena, no hematochezia, no fever/chills. Denied recent travel or change in diet. Of note, patient stated she does have issues with chronic constipation and sometimes diarrhea, questionable history of IBS. Patient takes miralax almost daily when constipated. Denied HAs, CP, SOB. Patient mentioned that she has L inguinal hernia, has been seen by Dr. Fernandez for it, no intervention to date. In the ED, vitals T 97.6F, HR 84, /83, RR 19, 97% on RA. CBC normal WBC with left shift 86.4% neutrophils and CMP WNL. UA showed small ketones, small blood, 3-5 RBCs, few bacteria. CT Abdomen and Pelvis showed Small bowel obstruction with the transition point in the left mid abdomen near the abdominal wall. Bowel containing left inguinal hernia without strangulation or incarceration. EKG: NSR, nonspecific S wave and T wave abnormalities. Given IV zofran 4mg, IV tylenol x 1 and 1L NS bolus. Pt was seen by surgery, NGT placed to suction. Pt found to sundown at night with attempts to remove NGT, and was seen by neurology who recommended Haldol PRN for agitation. Pt at this time denied abd pain. The next day, rectal suppository was given with successful BM overnight. NGT was then clamped with low residuals noted, so NGT was removed. Later that night, overnight team called by RN for episodes of bilious vomiting. Pt refused reinsertion of NGT and abdominal imaging overnight. Following AM, pt amenable to abd xray but continued refusing NGT. Xray showed signs of SBO, pt remained NPO, with plan for eventual NGT if vomiting persisted. Later that night pt accepted NGT which showed immediate drainage of bilious fluid. Pt remained on NGT, kept NPO with IVF with plan for possible surgical intervention Monday if SBO unresolved. KUB 11/25 showed improvement of SBO. KUB 11/26 showed no definite SBO. Pt started on gastroview challenge on 11/26 with q6-8h serial xrays. RRT was called about 20min after administration of contrast solution, for near syncopal episode after having BM on the toilet. Pt was found to be hypotensive and lethargic. EKG showed ?ischemic changes, cardiac enzymes and cardio was consulted. 1st set trops were negative, and cardio rec checking orthostatics as EKG did not show definite ischemic changes. Pt was given 250cc bolus as per surgery, and pt's BP improved. Pt was evaluated 2 hours after RRT, and found to be much more alert with stable VS.     Patient improved clinically throughout hospital course. Patient seen and examined on day of discharge.    Vital Signs Last 24 Hrs  T(C): 36.7 (23 Nov 2018 05:42), Max: 37.4 (22 Nov 2018 14:05)  T(F): 98 (23 Nov 2018 05:42), Max: 99.3 (22 Nov 2018 14:05)  HR: 71 (23 Nov 2018 05:42) (69 - 71)  BP: 153/62 (23 Nov 2018 05:42) (153/62 - 165/76)  BP(mean): --  RR: 17 (23 Nov 2018 05:42) (16 - 17)  SpO2: 93% (23 Nov 2018 05:42) (90% - 93%)    Physical Exam:  General: elderly female NAD  HEENT: NCAT, PERRLA, EOMI bl, moist mucous membranes.   Neck: Supple, nontender, no mass  Neurology: A&Ox3, nonfocal, CN II-XII grossly intact, sensation intact, no gait abnormalities   Respiratory: CTA B/L, No W/R/R  CV: RRR, +S1/S2, no murmurs, rubs or gallops  Abdominal: Soft, NT, ND +BSx4  Extremities: No C/C/E, + peripheral pulses  MSK: Normal ROM, no joint erythema or warmth, no joint swelling   Skin: warm, dry, normal color, no obvious rash or abnormal lesions    Patient is medically stable for discharge to ____ with outpatient follow up. Pt is a 89 YO F PMHx HTN, visual/auditory hallucinations, s/p cholecystectomy, s/p WILL, L inguinal hernia who presented to ED with chief complaint of abdominal pain and vomiting. Patient stated that several days ago, she began having abdominal pain. This pain resolved, patient was able to eat and have BMs. Later that day, after eating dinner around 6 pm, patient began having worsening abdominal pain, nausea and vomiting. Stated she vomited 3-4 times with brownish liquid consistency, NBNB. Patient endorsed BM that morning. She denied recent change in stool caliber or consistency, no melena, no hematochezia, no fever/chills. Denied recent travel or change in diet. Of note, patient stated she does have issues with chronic constipation and sometimes diarrhea, questionable history of IBS. Patient takes miralax almost daily when constipated. Denied HAs, CP, SOB. Patient mentioned that she has L inguinal hernia, has been seen by Dr. Fernandez for it, no intervention to date. In the ED, vitals T 97.6F, HR 84, /83, RR 19, 97% on RA. CBC normal WBC with left shift 86.4% neutrophils and CMP WNL. UA showed small ketones, small blood, 3-5 RBCs, few bacteria. CT Abdomen and Pelvis showed Small bowel obstruction with the transition point in the left mid abdomen near the abdominal wall. Bowel containing left inguinal hernia without strangulation or incarceration. EKG: NSR, nonspecific S wave and T wave abnormalities. Given IV zofran 4mg, IV tylenol x 1 and 1L NS bolus. Pt was seen by surgery, NGT placed to suction. Pt found to sundown at night with attempts to remove NGT, and was seen by neurology who recommended Haldol PRN for agitation. Pt at this time denied abd pain. The next day, rectal suppository was given with successful BM overnight. NGT was then clamped with low residuals noted, so NGT was removed. Later that night, overnight team called by RN for episodes of bilious vomiting. Pt refused reinsertion of NGT and abdominal imaging overnight. Following AM, pt amenable to abd xray but continued refusing NGT. Xray showed signs of SBO, pt remained NPO, with plan for eventual NGT if vomiting persisted. Later that night pt accepted NGT which showed immediate drainage of bilious fluid. Pt remained on NGT, kept NPO with IVF with plan for possible surgical intervention Monday if SBO unresolved. KUB 11/25 showed improvement of SBO. KUB 11/26 showed no definite SBO. Pt started on gastroview challenge on 11/26 with q6-8h serial xrays. RRT was called about 20min after administration of contrast solution, for near syncopal episode after having BM on the toilet. Pt was found to be hypotensive and lethargic. EKG showed ?ischemic changes, cardiac enzymes and cardio was consulted. 1st set trops were negative, and cardio rec checking orthostatics as EKG did not show definite ischemic changes. Pt was given 250cc bolus as per surgery, and pt's BP improved. Pt was evaluated 2 hours after RRT, and found to be much more alert with stable VS. Pt was seen by cardiology, who recommended orthostatics and holding metoprolol. Orthostatics were positive, and metoprolol was held. SBP in 150s -160s were acceptable as per cardio. Pt's NGT was removed on 11/27, and pt was advanced to full liquids. Pt tolerated this well, and was advanced to a regular diet 11/28 as per Surgery. Tolerating PO. Repeat orthostatics still positive, and pt became dizzy while eating found to be hypotensive 90/50s. 1L NS bolused as per Dr Mckeon cardiology, improved to 120/58. As per cardio, may require midodrine if symptoms dont improve. Pt admitted to R 5th digit pain, with exam showing erythema and mild swelling.  Xray hand for R 5th digit injury neg for f/x, but showed osteopenia and pt was given OsCal 500+ Vit D.    Patient improved clinically throughout hospital course. Patient seen and examined on day of discharge.    Vital Signs Last 24 Hrs  T(C): 36.7 (23 Nov 2018 05:42), Max: 37.4 (22 Nov 2018 14:05)  T(F): 98 (23 Nov 2018 05:42), Max: 99.3 (22 Nov 2018 14:05)  HR: 71 (23 Nov 2018 05:42) (69 - 71)  BP: 153/62 (23 Nov 2018 05:42) (153/62 - 165/76)  BP(mean): --  RR: 17 (23 Nov 2018 05:42) (16 - 17)  SpO2: 93% (23 Nov 2018 05:42) (90% - 93%)    Physical Exam:  General: elderly female NAD  HEENT: NCAT, PERRLA, EOMI bl, moist mucous membranes.   Neck: Supple, nontender, no mass  Neurology: A&Ox3, nonfocal, CN II-XII grossly intact, sensation intact, no gait abnormalities   Respiratory: CTA B/L, No W/R/R  CV: RRR, +S1/S2, no murmurs, rubs or gallops  Abdominal: Soft, NT, ND +BSx4  Extremities: No C/C/E, + peripheral pulses  MSK: Normal ROM, no joint erythema or warmth, no joint swelling   Skin: warm, dry, normal color, no obvious rash or abnormal lesions    Patient is medically stable for discharge to ____ with outpatient follow up. Pt is a 89 YO F PMHx HTN, visual/auditory hallucinations, s/p cholecystectomy, s/p WILL, L inguinal hernia who presented to ED with chief complaint of abdominal pain and vomiting. Patient stated that several days ago, she began having abdominal pain. This pain resolved, patient was able to eat and have BMs. Later that day, after eating dinner around 6 pm, patient began having worsening abdominal pain, nausea and vomiting. Stated she vomited 3-4 times with brownish liquid consistency, NBNB. Patient endorsed BM that morning. She denied recent change in stool caliber or consistency, no melena, no hematochezia, no fever/chills. Denied recent travel or change in diet. Of note, patient stated she does have issues with chronic constipation and sometimes diarrhea, questionable history of IBS. Patient takes miralax almost daily when constipated. Denied HAs, CP, SOB. Patient mentioned that she has L inguinal hernia, has been seen by Dr. Fernandez for it, no intervention to date. In the ED, vitals T 97.6F, HR 84, /83, RR 19, 97% on RA. CBC normal WBC with left shift 86.4% neutrophils and CMP WNL. UA showed small ketones, small blood, 3-5 RBCs, few bacteria. CT Abdomen and Pelvis showed Small bowel obstruction with the transition point in the left mid abdomen near the abdominal wall. Bowel containing left inguinal hernia without strangulation or incarceration. EKG: NSR, nonspecific S wave and T wave abnormalities. Given IV zofran 4mg, IV tylenol x 1 and 1L NS bolus. Pt was seen by surgery, NGT placed to suction. Pt found to sundown at night with attempts to remove NGT, and was seen by neurology who recommended Haldol PRN for agitation. Pt at this time denied abd pain. The next day, rectal suppository was given with successful BM overnight. NGT was then clamped with low residuals noted, so NGT was removed. Later that night, overnight team called by RN for episodes of bilious vomiting. Pt refused reinsertion of NGT and abdominal imaging overnight. Following AM, pt amenable to abd xray but continued refusing NGT. Xray showed signs of SBO, pt remained NPO, with plan for eventual NGT if vomiting persisted. Later that night pt accepted NGT which showed immediate drainage of bilious fluid. Pt remained on NGT, kept NPO with IVF with plan for possible surgical intervention Monday if SBO unresolved. KUB 11/25 showed improvement of SBO. KUB 11/26 showed no definite SBO. Pt started on gastroview challenge on 11/26 with q6-8h serial xrays. RRT was called about 20min after administration of contrast solution, for near syncopal episode after having BM on the toilet. Pt was found to be hypotensive and lethargic. EKG showed ?ischemic changes, cardiac enzymes and cardio was consulted. 1st set trops were negative, and cardio rec checking orthostatics as EKG did not show definite ischemic changes. Pt was given 250cc bolus as per surgery, and pt's BP improved. Pt was evaluated 2 hours after RRT, and found to be much more alert with stable VS. Pt was seen by cardiology, who recommended orthostatics and holding metoprolol. Orthostatics were positive, and metoprolol was held. SBP in 150s -160s were acceptable as per cardio. Pt's NGT was removed on 11/27, and pt was advanced to full liquids. Pt tolerated this well, and was advanced to a regular diet 11/28 as per Surgery. Tolerating PO. Repeat orthostatics still positive, and pt became dizzy while eating found to be hypotensive 90/50s. 1L NS bolused as per Dr Mckeon cardiology, improved to 120/58. As per cardio, may require midodrine if symptoms dont improve. Pt admitted to R 5th digit pain, with exam showing erythema and mild swelling.  Xray hand for R 5th digit injury neg for f/x, but showed osteopenia and pt was given OsCal 500+ Vit D.  Pt fluctuated with periods of hypotension likely due to decreased physical activity, and was started on midodrine 2.5mg TID. Pt had a BM after tolerating regular diet, and felt well overall.    Patient improved clinically throughout hospital course. Patient seen and examined on day of discharge.    Vital Signs Last 24 Hrs  T(C): 36.6 (29 Nov 2018 04:59), Max: 36.6 (28 Nov 2018 13:08)  T(F): 97.9 (29 Nov 2018 04:59), Max: 97.9 (29 Nov 2018 04:59)  HR: 80 (29 Nov 2018 04:59) (80 - 95)  BP: 110/71 (29 Nov 2018 04:59) (94/57 - 120/58)  BP(mean): --  RR: 16 (29 Nov 2018 04:59) (16 - 20)  SpO2: 94% (29 Nov 2018 04:59) (94% - 97%)    Physical Exam:  General: elderly female in NAD  HEENT: NCAT, PERRLA, EOMI bl, moist mucous membranes   Neck: Supple, nontender, no mass  Neurology: A&Ox1, nonfocal, CN II-XII grossly intact, sensation intact  Respiratory: CTA B/L, No W/R/R  CV: RRR, +S1/S2, no murmurs, rubs or gallops  Abdominal: Soft, NT, ND +BSx4  Extremities: No C/C/E, + peripheral pulses  MSK: Normal ROM, no joint erythema or warmth, no joint swelling   Skin: warm, dry, normal color, no obvious rash or abnormal lesions    Patient is medically stable for discharge to subacute rehab with outpatient follow up. Pt is a 89 YO F PMHx HTN, visual/auditory hallucinations, s/p cholecystectomy, s/p WILL, L inguinal hernia who presented to ED with chief complaint of abdominal pain and vomiting. Patient stated that several days ago, she began having abdominal pain. This pain resolved, patient was able to eat and have BMs. Later that day, after eating dinner around 6 pm, patient began having worsening abdominal pain, nausea and vomiting. Stated she vomited 3-4 times with brownish liquid consistency, NBNB. Patient endorsed BM that morning. She denied recent change in stool caliber or consistency, no melena, no hematochezia, no fever/chills. Denied recent travel or change in diet. Of note, patient stated she does have issues with chronic constipation and sometimes diarrhea, questionable history of IBS. Patient takes miralax almost daily when constipated. Denied HAs, CP, SOB. Patient mentioned that she has L inguinal hernia, has been seen by Dr. Fernandez for it, no intervention to date. In the ED, vitals T 97.6F, HR 84, /83, RR 19, 97% on RA. CBC normal WBC with left shift 86.4% neutrophils and CMP WNL. UA showed small ketones, small blood, 3-5 RBCs, few bacteria. CT Abdomen and Pelvis showed Small bowel obstruction with the transition point in the left mid abdomen near the abdominal wall. Bowel containing left inguinal hernia without strangulation or incarceration. EKG: NSR, nonspecific S wave and T wave abnormalities. Given IV zofran 4mg, IV tylenol x 1 and 1L NS bolus. Pt was seen by surgery, NGT placed to suction. Pt found to sundown at night with attempts to remove NGT, and was seen by neurology who recommended Haldol PRN for agitation. Pt at this time denied abd pain. The next day, rectal suppository was given with successful BM overnight. NGT was then clamped with low residuals noted, so NGT was removed. Later that night, overnight team called by RN for episodes of bilious vomiting. Pt refused reinsertion of NGT and abdominal imaging overnight. Following AM, pt amenable to abd xray but continued refusing NGT. Xray showed signs of SBO, pt remained NPO, with plan for eventual NGT if vomiting persisted. Later that night pt accepted NGT which showed immediate drainage of bilious fluid. Pt remained on NGT, kept NPO with IVF with plan for possible surgical intervention Monday if SBO unresolved. KUB 11/25 showed improvement of SBO. KUB 11/26 showed no definite SBO. Pt started on gastroview challenge on 11/26 with q6-8h serial xrays. RRT was called about 20min after administration of contrast solution, for near syncopal episode after having BM on the toilet. Pt was found to be hypotensive and lethargic. EKG showed ?ischemic changes, cardiac enzymes and cardio was consulted. 1st set trops were negative, and cardio rec checking orthostatics as EKG did not show definite ischemic changes. Pt was given 250cc bolus as per surgery, and pt's BP improved. Pt was evaluated 2 hours after RRT, and found to be much more alert with stable VS. Pt was seen by cardiology, who recommended orthostatics and holding metoprolol. Orthostatics were positive, and metoprolol was held. SBP in 150s -160s were acceptable as per cardio. Pt's NGT was removed on 11/27, and pt was advanced to full liquids. Pt tolerated this well, and was advanced to a regular diet 11/28 as per Surgery. Tolerating PO. Repeat orthostatics still positive, and pt became dizzy while eating found to be hypotensive 90/50s. 1L NS bolused as per Dr Mckeon cardiology, improved to 120/58. As per cardio, may require midodrine if symptoms dont improve. Pt admitted to R 5th digit pain, with exam showing erythema and mild swelling.  Xray hand for R 5th digit injury neg for f/x, but showed osteopenia and pt was given OsCal 500+ Vit D.  Pt fluctuated with periods of hypotension likely due to decreased physical activity, and was started on midodrine 2.5mg TID. Pt had several BMs after tolerating regular diet, and felt well overall. PT recommended discharge to [facility] when medically cleared and pt's BP improved on day of discharge.    Patient improved clinically throughout hospital course. Patient seen and examined on day of discharge.    Vital Signs Last 24 Hrs  T(C): 36.6 (30 Nov 2018 05:12), Max: 36.6 (29 Nov 2018 13:31)  T(F): 97.8 (30 Nov 2018 05:12), Max: 97.9 (29 Nov 2018 13:31)  HR: 84 (30 Nov 2018 05:12) (84 - 85)  BP: 127/74 (30 Nov 2018 05:12) (95/60 - 127/74)  BP(mean): --  RR: 16 (30 Nov 2018 05:12) (16 - 18)  SpO2: 92% (30 Nov 2018 05:12) (92% - 97%)    Physical Exam:  General: elderly female in NAD  HEENT: NCAT, PERRLA, EOMI b/l, moist mucous membranes   Neck: Supple, nontender, no mass  Neurology: A&Ox1, nonfocal, CN II-XII grossly intact, sensation intact  Respiratory: CTA B/L, No W/R/R  CV: RRR, +S1/S2, no murmurs, rubs or gallops  Abdominal: Soft, NT, ND +BSx4  Extremities: No C/C/E, + peripheral pulses  MSK: Normal ROM, no joint erythema or warmth, no joint swelling   Skin: warm, dry, normal color, no obvious rash or abnormal lesions    Patient is medically stable for discharge to [facility] with outpatient follow up.

## 2018-11-21 NOTE — DISCHARGE NOTE ADULT - MEDICATION SUMMARY - MEDICATIONS TO TAKE
I will START or STAY ON the medications listed below when I get home from the hospital:    aspirin 81 mg oral delayed release tablet  -- 1 tab(s) by mouth once a day  -- Indication: For preventative measure    enoxaparin  -- 40 unit(s) subcutaneous once a day  -- Indication: For DVT prophylaxis    docusate sodium 100 mg oral capsule  -- 1 cap(s) by mouth 3 times a day  -- Indication: For GI prophylaxis    midodrine 2.5 mg oral tablet  -- 1 tab(s) by mouth 3 times a day  -- Indication: For Hypotension    calcium-vitamin D 500 mg-200 intl units oral tablet  -- 1 tab(s) by mouth once a day  -- Indication: For osteopenia

## 2018-11-21 NOTE — PROGRESS NOTE ADULT - ASSESSMENT
Pt is a 89 y/o F with PMHx HTN, visual/auditory hallucinations, s/p cholecystectomy, s/p WILL, L inguinal hernia admitted to [facility] for conservative vs surgical management of SBO.

## 2018-11-22 LAB
ANION GAP SERPL CALC-SCNC: 10 MMOL/L — SIGNIFICANT CHANGE UP (ref 5–17)
BASOPHILS # BLD AUTO: 0.01 K/UL — SIGNIFICANT CHANGE UP (ref 0–0.2)
BASOPHILS NFR BLD AUTO: 0.1 % — SIGNIFICANT CHANGE UP (ref 0–2)
BUN SERPL-MCNC: 21 MG/DL — SIGNIFICANT CHANGE UP (ref 7–23)
CALCIUM SERPL-MCNC: 7.8 MG/DL — LOW (ref 8.5–10.1)
CHLORIDE SERPL-SCNC: 110 MMOL/L — HIGH (ref 96–108)
CO2 SERPL-SCNC: 27 MMOL/L — SIGNIFICANT CHANGE UP (ref 22–31)
CREAT SERPL-MCNC: 0.52 MG/DL — SIGNIFICANT CHANGE UP (ref 0.5–1.3)
EOSINOPHIL # BLD AUTO: 0 K/UL — SIGNIFICANT CHANGE UP (ref 0–0.5)
EOSINOPHIL NFR BLD AUTO: 0 % — SIGNIFICANT CHANGE UP (ref 0–6)
GLUCOSE SERPL-MCNC: 90 MG/DL — SIGNIFICANT CHANGE UP (ref 70–99)
HCT VFR BLD CALC: 38.8 % — SIGNIFICANT CHANGE UP (ref 34.5–45)
HGB BLD-MCNC: 13.3 G/DL — SIGNIFICANT CHANGE UP (ref 11.5–15.5)
IMM GRANULOCYTES NFR BLD AUTO: 0.2 % — SIGNIFICANT CHANGE UP (ref 0–1.5)
LYMPHOCYTES # BLD AUTO: 0.58 K/UL — LOW (ref 1–3.3)
LYMPHOCYTES # BLD AUTO: 6.1 % — LOW (ref 13–44)
MAGNESIUM SERPL-MCNC: 1.9 MG/DL — SIGNIFICANT CHANGE UP (ref 1.6–2.6)
MCHC RBC-ENTMCNC: 32 PG — SIGNIFICANT CHANGE UP (ref 27–34)
MCHC RBC-ENTMCNC: 34.3 GM/DL — SIGNIFICANT CHANGE UP (ref 32–36)
MCV RBC AUTO: 93.3 FL — SIGNIFICANT CHANGE UP (ref 80–100)
MONOCYTES # BLD AUTO: 0.36 K/UL — SIGNIFICANT CHANGE UP (ref 0–0.9)
MONOCYTES NFR BLD AUTO: 3.8 % — SIGNIFICANT CHANGE UP (ref 2–14)
NEUTROPHILS # BLD AUTO: 8.47 K/UL — HIGH (ref 1.8–7.4)
NEUTROPHILS NFR BLD AUTO: 89.8 % — HIGH (ref 43–77)
PHOSPHATE SERPL-MCNC: 2.4 MG/DL — LOW (ref 2.5–4.5)
PLATELET # BLD AUTO: 146 K/UL — LOW (ref 150–400)
POTASSIUM SERPL-MCNC: 3.4 MMOL/L — LOW (ref 3.5–5.3)
POTASSIUM SERPL-SCNC: 3.4 MMOL/L — LOW (ref 3.5–5.3)
RBC # BLD: 4.16 M/UL — SIGNIFICANT CHANGE UP (ref 3.8–5.2)
RBC # FLD: 12.2 % — SIGNIFICANT CHANGE UP (ref 10.3–14.5)
SODIUM SERPL-SCNC: 147 MMOL/L — HIGH (ref 135–145)
WBC # BLD: 9.44 K/UL — SIGNIFICANT CHANGE UP (ref 3.8–10.5)
WBC # FLD AUTO: 9.44 K/UL — SIGNIFICANT CHANGE UP (ref 3.8–10.5)

## 2018-11-22 RX ORDER — POTASSIUM CHLORIDE 20 MEQ
10 PACKET (EA) ORAL
Qty: 0 | Refills: 0 | Status: COMPLETED | OUTPATIENT
Start: 2018-11-22 | End: 2018-11-22

## 2018-11-22 RX ORDER — DEXTROSE MONOHYDRATE, SODIUM CHLORIDE, AND POTASSIUM CHLORIDE 50; .745; 4.5 G/1000ML; G/1000ML; G/1000ML
1000 INJECTION, SOLUTION INTRAVENOUS
Qty: 0 | Refills: 0 | Status: DISCONTINUED | OUTPATIENT
Start: 2018-11-22 | End: 2018-11-28

## 2018-11-22 RX ADMIN — ONDANSETRON 4 MILLIGRAM(S): 8 TABLET, FILM COATED ORAL at 00:17

## 2018-11-22 RX ADMIN — ENOXAPARIN SODIUM 40 MILLIGRAM(S): 100 INJECTION SUBCUTANEOUS at 12:01

## 2018-11-22 RX ADMIN — Medication 5 MILLIGRAM(S): at 06:21

## 2018-11-22 RX ADMIN — Medication 5 MILLIGRAM(S): at 17:35

## 2018-11-22 RX ADMIN — Medication 5 MILLIGRAM(S): at 12:01

## 2018-11-22 RX ADMIN — Medication 5 MILLIGRAM(S): at 00:17

## 2018-11-22 RX ADMIN — Medication 100 MILLIEQUIVALENT(S): at 16:52

## 2018-11-22 RX ADMIN — Medication 100 MILLIEQUIVALENT(S): at 18:19

## 2018-11-22 RX ADMIN — DEXTROSE MONOHYDRATE, SODIUM CHLORIDE, AND POTASSIUM CHLORIDE 75 MILLILITER(S): 50; .745; 4.5 INJECTION, SOLUTION INTRAVENOUS at 16:52

## 2018-11-22 NOTE — PROGRESS NOTE ADULT - SUBJECTIVE AND OBJECTIVE BOX
Patient is a 89y old  Female who presents with a chief complaint of Abdominal pain, vomiting (22 Nov 2018 11:11)  ngt reinserted last night  deneis abd pain  less agitated and more lucid    INTERVAL HPI/OVERNIGHT EVENTS:  T(C): 37.4 (11-22-18 @ 14:05), Max: 37.4 (11-22-18 @ 14:05)  HR: 69 (11-22-18 @ 14:05) (68 - 78)  BP: 159/75 (11-22-18 @ 14:05) (151/62 - 163/67)  RR: 16 (11-22-18 @ 14:05) (16 - 18)  SpO2: 90% (11-22-18 @ 14:05) (89% - 92%)  Wt(kg): --  I&O's Summary    21 Nov 2018 07:01  -  22 Nov 2018 07:00  --------------------------------------------------------  IN: 0 mL / OUT: 850 mL / NET: -850 mL        LABS:                        13.3   9.44  )-----------( 146      ( 22 Nov 2018 08:35 )             38.8     11-22    147<H>  |  110<H>  |  21  ----------------------------<  90  3.4<L>   |  27  |  0.52    Ca    7.8<L>      22 Nov 2018 08:35  Phos  2.4     11-22  Mg     1.9     11-22          MEDICATIONS  (STANDING):  enoxaparin Injectable 40 milliGRAM(s) SubCutaneous daily  metoprolol tartrate Injectable 5 milliGRAM(s) IV Push every 6 hours  potassium chloride  10 mEq/100 mL IVPB 10 milliEquivalent(s) IV Intermittent every 1 hour  sodium chloride 0.45% with potassium chloride 20 mEq/L 1000 milliLiter(s) (75 mL/Hr) IV Continuous <Continuous>    MEDICATIONS  (PRN):  haloperidol    Injectable 0.5 milliGRAM(s) IntraMuscular every 6 hours PRN Agitation  ondansetron Injectable 4 milliGRAM(s) IV Push every 6 hours PRN Nausea and/or Vomiting      REVIEW OF SYSTEMS:  CONSTITUTIONAL: No fever, weight loss, or fatigue  EYES: No eye pain, visual disturbances, or discharge  ENMT:  No difficulty hearing, tinnitus, vertigo; No sinus or throat pain  NECK: No pain or stiffness  RESPIRATORY: No cough, wheezing, chills or hemoptysis; No shortness of breath  CARDIOVASCULAR: No chest pain, palpitations, dizziness, or leg swelling  GASTROINTESTINAL: nausea and vomiting resolved post ngt placement  GENITOURINARY: No dysuria, frequency, hematuria, or incontinence  NEUROLOGICAL: confusion, less agitation  SKIN: No itching, burning, rashes, or lesions   LYMPH NODES: No enlarged glands  ENDOCRINE: No heat or cold intolerance; No hair loss  MUSCULOSKELETAL: No joint pain or swelling; No muscle, back, or extremity pain  PSYCHIATRIC: No depression, anxiety, mood swings, or difficulty sleeping  HEME/LYMPH: No easy bruising, or bleeding gums  ALLERY AND IMMUNOLOGIC: No hives or eczema    RADIOLOGY & ADDITIONAL TESTS:    Imaging Personally Reviewed:  [ ] YES  [ ] NO    Consultant(s) Notes Reviewed:  [ ] YES  [ ] NO    PHYSICAL EXAM:  GENERAL: NAD, well-groomed, well-developed  HEAD:  Atraumatic, Normocephalic  EYES: EOMI, PERRLA, conjunctiva and sclera clear  ENMT: No tonsillar erythema, exudates, or enlargement; Moist mucous membranes, Good dentition, No lesions  NECK: Supple, No JVD, Normal thyroid  NERVOUS SYSTEM:  Alert & Oriented X2, poor concentration  CHEST/LUNG: Clear to percussion bilaterally; No rales, rhonchi, wheezing, or rubs  HEART: Regular rate and rhythm; No murmurs, rubs, or gallops  ABDOMEN: Soft, Nontender, Nondistended; Bowel sounds present  EXTREMITIES:  2+ Peripheral Pulses, No clubbing, cyanosis, or edema  LYMPH: No lymphadenopathy noted  SKIN: No rashes or lesions    Care Discussed with Consultants/Other Providers [ ] YES  [ ] NO

## 2018-11-22 NOTE — PROGRESS NOTE ADULT - SUBJECTIVE AND OBJECTIVE BOX
pt seen  finally agreed to NGT after vomiting.  850 cc drained immediately  Feeling slightly better  -f-bm  ICU Vital Signs Last 24 Hrs  T(C): 37.1 (22 Nov 2018 04:49), Max: 37.2 (22 Nov 2018 00:24)  T(F): 98.7 (22 Nov 2018 04:49), Max: 99 (22 Nov 2018 00:24)  HR: 73 (22 Nov 2018 04:49) (70 - 78)  BP: 163/67 (22 Nov 2018 04:49) (151/62 - 167/73)  BP(mean): --  ABP: --  ABP(mean): --  RR: 16 (22 Nov 2018 04:49) (16 - 18)  SpO2: 90% (22 Nov 2018 04:49) (90% - 92%)  gen-NAD  resp-clear  abd-soft, ND, less tender    I&O's Detail    20 Nov 2018 07:01  -  21 Nov 2018 07:00  --------------------------------------------------------  IN:  Total IN: 0 mL    OUT:    Nasoenteral Tube: 700 mL  Total OUT: 700 mL    Total NET: -700 mL      21 Nov 2018 07:01  -  22 Nov 2018 05:13  --------------------------------------------------------  IN:  Total IN: 0 mL    OUT:    Nasoenteral Tube: 850 mL  Total OUT: 850 mL    Total NET: -850 mL

## 2018-11-22 NOTE — PROGRESS NOTE ADULT - PROBLEM SELECTOR PLAN 2
- WBC 14, however pt is afebrile. Likely 2/2 reactive stress  - pt without evidence for acute abdomen, denies pain. Abd NT, ND.   - continue to monitor, f/u AM CBC

## 2018-11-22 NOTE — PROGRESS NOTE ADULT - PROBLEM SELECTOR PLAN 1
events as noted  ngt reinserted  may need surgical intervention if no improvement over next few days

## 2018-11-22 NOTE — PROGRESS NOTE ADULT - SUBJECTIVE AND OBJECTIVE BOX
Neurology Follow up note    ROD POST89yFemale    HPI:  89 YO F PMHx HTN, visual/auditory hallucinations, s/p cholecystectomy, s/p WILL, L inguinal hernia who presents to ED with chief complaint of abdominal pain and vomiting. Patient states that 2 days ago, she began having abdominal pain. This pain resolved, patient was able to eat and have BMs. Today after eating dinner around 6 pm, patient began having worsening abdominal pain, nausea and vomiting. States she vomited 3-4 times with brownish liquid consistency, NBNB. Patient endorses BM this morning. She denies recent change in stool caliber or consistency, no melena, no hematochezia, no fever/chills. Denies recent travel or change in diet. Of note, patient states she does have issues with chronic constipation and sometimes diarrhea, questionable history of IBS. Patient takes miralax almost daily when constipated. Denies HAs, CP, SOB. Patient mentions that she has L inguinal hernia, has been seen by Dr. Fernandez for it, no intervention to date.     In the ED, vitals T 97.6F, HR 84, /83, RR 19, 97% on RA. CBC normal WBC with left shift 86.4% neutrophils and CMP WNL. UA small ketones, small blood, 3-5 RBCs, few bacteria. CT Abdomen and Pelvis Small bowel obstruction with the transition point in the left mid abdomen near the abdominal wall. Bowel containing left inguinal hernia without strangulation or incarceration. EKG: NSR (unofficial read)    Given IV zofran 4mg, IV tylenol x 1 and 1L NS bolus. (18 Nov 2018 01:05)      Interval History - mild tremor    Patient is seen, chart was reviewed and case was discussed with the treatment team.  Pt is not in any distress.   Lying on bed comfortably.   No events reported overnight.   No clinical seizure was reported.  Sitting on chair bed comfortably.    is at bedside.    Vital Signs Last 24 Hrs  T(C): 37.1 (22 Nov 2018 04:49), Max: 37.2 (22 Nov 2018 00:24)  T(F): 98.7 (22 Nov 2018 04:49), Max: 99 (22 Nov 2018 00:24)  HR: 68 (22 Nov 2018 06:33) (68 - 78)  BP: 160/62 (22 Nov 2018 06:33) (151/62 - 167/73)  BP(mean): --  RR: 16 (22 Nov 2018 07:18) (16 - 18)  SpO2: 91% (22 Nov 2018 07:18) (89% - 92%)        REVIEW OF SYSTEMS:    Constitutional: No fever, weight loss or fatigue  Eyes: No eye pain, visual disturbances, or discharge  ENT:  No difficulty hearing, tinnitus, vertigo; No sinus or throat pain  Neck: No pain or stiffness  Respiratory: mild  cough  Cardiovascular: No chest pain, palpitations, shortness of breath, dizziness or leg swelling  Gastrointestinal: No abdominal or epigastric pain. No nausea, vomiting or hematemesis; No diarrhea or constipation. No melena or hematochezia.  Genitourinary: No dysuria, frequency, hematuria or incontinence  Neurological: No headaches, memory loss, loss of strength, numbness or tremors  Psychiatric: No depression, anxiety, mood swings or difficulty sleeping  Musculoskeletal: No joint pain or swelling; No muscle, back or extremity pain  Skin: No itching, burning, rashes or lesions   Lymph Nodes: No enlarged glands  Endocrine: No heat or cold intolerance; No hair loss, No h/o diabetes or thyroid dysfunction  Allergy and Immunologic: No hives or eczema    On Neurological Examination:    Mental Status - Pt is alert, awake, oriented X3.     Speech -  Normal.     Cranial Nerves - Pupils 3 mm equal and reactive to light, extraocular eye movements intact. Pt has no visual field deficit.  Pt has no facial asymmetry. Facial sensation is intact.Tongue - is in midline.    Muscle tone - is normal.     Motor Exam - 5/5 of UE.  LE 4+/5  No drift.   MILD ACTION tremors.    Sensory Exam - Pt withdraws all extremities equally on stimulation. No asymmetry seen. No complaints of tingling, numbness.      coordination:    Finger to nose: normal      Deep tendon Reflexes - 2 plus all over.        Romberg - Negative.    Neck Supple -  Yes.     MEDICATIONS    enoxaparin Injectable 40 milliGRAM(s) SubCutaneous daily  haloperidol    Injectable 0.5 milliGRAM(s) IntraMuscular every 6 hours PRN  metoprolol tartrate Injectable 5 milliGRAM(s) IV Push every 6 hours  ondansetron Injectable 4 milliGRAM(s) IV Push every 6 hours PRN  sodium chloride 0.9% 1000 milliLiter(s) IV Continuous <Continuous>      Allergies    Demerol (Unknown)    Intolerances        LABS:  CBC Full  -  ( 22 Nov 2018 08:35 )  WBC Count : 9.44 K/uL  Hemoglobin : 13.3 g/dL  Hematocrit : 38.8 %  Platelet Count - Automated : 146 K/uL  Mean Cell Volume : 93.3 fl  Mean Cell Hemoglobin : 32.0 pg  Mean Cell Hemoglobin Concentration : 34.3 gm/dL  Auto Neutrophil # : 8.47 K/uL  Auto Lymphocyte # : 0.58 K/uL  Auto Monocyte # : 0.36 K/uL  Auto Eosinophil # : 0.00 K/uL        11-22    147<H>  |  110<H>  |  21  ----------------------------<  90  3.4<L>   |  27  |  0.52    Ca    7.8<L>      22 Nov 2018 08:35  Phos  2.4     11-22  Mg     1.9     11-22      Hemoglobin A1C:     Vitamin B12     RADIOLOGY    ASSESSMENT AND PLAN:      DEMENTIA WITH BEHAVIORAL DISTURBANCE.  SBO.    HALDOL PRN FOR AGITATION.  AVOID BENZO AS SHE HAS SBO.  Physical therapy evaluation.  Advanced care planning was discussed with family.  Pain is accessed and addressed.  Plan of care was discussed with family. Questions answered.  Would continue to follow. Neurology Follow up note    ROD POST89yFemale    HPI:  89 YO F PMHx HTN, visual/auditory hallucinations, s/p cholecystectomy, s/p WILL, L inguinal hernia who presents to ED with chief complaint of abdominal pain and vomiting. Patient states that 2 days ago, she began having abdominal pain. This pain resolved, patient was able to eat and have BMs. Today after eating dinner around 6 pm, patient began having worsening abdominal pain, nausea and vomiting. States she vomited 3-4 times with brownish liquid consistency, NBNB. Patient endorses BM this morning. She denies recent change in stool caliber or consistency, no melena, no hematochezia, no fever/chills. Denies recent travel or change in diet. Of note, patient states she does have issues with chronic constipation and sometimes diarrhea, questionable history of IBS. Patient takes miralax almost daily when constipated. Denies HAs, CP, SOB. Patient mentions that she has L inguinal hernia, has been seen by Dr. Fernandez for it, no intervention to date.     In the ED, vitals T 97.6F, HR 84, /83, RR 19, 97% on RA. CBC normal WBC with left shift 86.4% neutrophils and CMP WNL. UA small ketones, small blood, 3-5 RBCs, few bacteria. CT Abdomen and Pelvis Small bowel obstruction with the transition point in the left mid abdomen near the abdominal wall. Bowel containing left inguinal hernia without strangulation or incarceration. EKG: NSR (unofficial read)    Given IV zofran 4mg, IV tylenol x 1 and 1L NS bolus. (18 Nov 2018 01:05)      Interval History - mild tremor    Patient is seen, chart was reviewed and case was discussed with the treatment team.  Pt is not in any distress.   Lying on bed comfortably.   No events reported overnight.   No clinical seizure was reported.  Sitting on chair bed comfortably.    is at bedside.    Vital Signs Last 24 Hrs  T(C): 37.1 (22 Nov 2018 04:49), Max: 37.2 (22 Nov 2018 00:24)  T(F): 98.7 (22 Nov 2018 04:49), Max: 99 (22 Nov 2018 00:24)  HR: 68 (22 Nov 2018 06:33) (68 - 78)  BP: 160/62 (22 Nov 2018 06:33) (151/62 - 167/73)  BP(mean): --  RR: 16 (22 Nov 2018 07:18) (16 - 18)  SpO2: 91% (22 Nov 2018 07:18) (89% - 92%)        REVIEW OF SYSTEMS:  limited due dementia.      On Neurological Examination:    Mental Status - Pt is alert, awake, oriented X1     Speech -  Normal.     Cranial Nerves - Pupils 3 mm equal and reactive to light, extraocular eye movements intact. Pt has no visual field deficit.  Pt has no facial asymmetry. Facial sensation is intact.Tongue - is in midline.    Muscle tone - is normal.     Motor Exam - 5/5 of UE.  LE 4+/5  No drift.      Sensory Exam - Pt withdraws all extremities equally on stimulation. No asymmetry seen.      coordination:    Finger to nose: normal      Deep tendon Reflexes - 2 plus all over.        Romberg - Negative.    Neck Supple -  Yes.     MEDICATIONS    enoxaparin Injectable 40 milliGRAM(s) SubCutaneous daily  haloperidol    Injectable 0.5 milliGRAM(s) IntraMuscular every 6 hours PRN  metoprolol tartrate Injectable 5 milliGRAM(s) IV Push every 6 hours  ondansetron Injectable 4 milliGRAM(s) IV Push every 6 hours PRN  sodium chloride 0.9% 1000 milliLiter(s) IV Continuous <Continuous>      Allergies    Demerol (Unknown)    Intolerances        LABS:  CBC Full  -  ( 22 Nov 2018 08:35 )  WBC Count : 9.44 K/uL  Hemoglobin : 13.3 g/dL  Hematocrit : 38.8 %  Platelet Count - Automated : 146 K/uL  Mean Cell Volume : 93.3 fl  Mean Cell Hemoglobin : 32.0 pg  Mean Cell Hemoglobin Concentration : 34.3 gm/dL  Auto Neutrophil # : 8.47 K/uL  Auto Lymphocyte # : 0.58 K/uL  Auto Monocyte # : 0.36 K/uL  Auto Eosinophil # : 0.00 K/uL        11-22    147<H>  |  110<H>  |  21  ----------------------------<  90  3.4<L>   |  27  |  0.52    Ca    7.8<L>      22 Nov 2018 08:35  Phos  2.4     11-22  Mg     1.9     11-22      Hemoglobin A1C:     Vitamin B12     RADIOLOGY    ASSESSMENT AND PLAN:      DEMENTIA WITH BEHAVIORAL DISTURBANCE.  SBO.    HALDOL PRN FOR AGITATION.  AVOID BENZO AS SHE HAS SBO.  Physical therapy evaluation.  Advanced care planning was discussed with family.  Pain is accessed and addressed.  Plan of care was discussed with family. Questions answered.  Would continue to follow.

## 2018-11-23 LAB
ANION GAP SERPL CALC-SCNC: 12 MMOL/L — SIGNIFICANT CHANGE UP (ref 5–17)
BUN SERPL-MCNC: 20 MG/DL — SIGNIFICANT CHANGE UP (ref 7–23)
CALCIUM SERPL-MCNC: 7.8 MG/DL — LOW (ref 8.5–10.1)
CHLORIDE SERPL-SCNC: 108 MMOL/L — SIGNIFICANT CHANGE UP (ref 96–108)
CO2 SERPL-SCNC: 25 MMOL/L — SIGNIFICANT CHANGE UP (ref 22–31)
CREAT SERPL-MCNC: 0.43 MG/DL — LOW (ref 0.5–1.3)
GLUCOSE SERPL-MCNC: 80 MG/DL — SIGNIFICANT CHANGE UP (ref 70–99)
HCT VFR BLD CALC: 40.6 % — SIGNIFICANT CHANGE UP (ref 34.5–45)
HGB BLD-MCNC: 13.9 G/DL — SIGNIFICANT CHANGE UP (ref 11.5–15.5)
MCHC RBC-ENTMCNC: 31.6 PG — SIGNIFICANT CHANGE UP (ref 27–34)
MCHC RBC-ENTMCNC: 34.2 GM/DL — SIGNIFICANT CHANGE UP (ref 32–36)
MCV RBC AUTO: 92.3 FL — SIGNIFICANT CHANGE UP (ref 80–100)
NRBC # BLD: 0 /100 WBCS — SIGNIFICANT CHANGE UP (ref 0–0)
PLATELET # BLD AUTO: 174 K/UL — SIGNIFICANT CHANGE UP (ref 150–400)
POTASSIUM SERPL-MCNC: 3.4 MMOL/L — LOW (ref 3.5–5.3)
POTASSIUM SERPL-SCNC: 3.4 MMOL/L — LOW (ref 3.5–5.3)
RBC # BLD: 4.4 M/UL — SIGNIFICANT CHANGE UP (ref 3.8–5.2)
RBC # FLD: 12 % — SIGNIFICANT CHANGE UP (ref 10.3–14.5)
SODIUM SERPL-SCNC: 145 MMOL/L — SIGNIFICANT CHANGE UP (ref 135–145)
WBC # BLD: 8.74 K/UL — SIGNIFICANT CHANGE UP (ref 3.8–10.5)
WBC # FLD AUTO: 8.74 K/UL — SIGNIFICANT CHANGE UP (ref 3.8–10.5)

## 2018-11-23 PROCEDURE — 74018 RADEX ABDOMEN 1 VIEW: CPT | Mod: 26

## 2018-11-23 RX ORDER — POTASSIUM CHLORIDE 20 MEQ
10 PACKET (EA) ORAL
Qty: 0 | Refills: 0 | Status: DISCONTINUED | OUTPATIENT
Start: 2018-11-23 | End: 2018-11-23

## 2018-11-23 RX ORDER — POTASSIUM PHOSPHATE, MONOBASIC POTASSIUM PHOSPHATE, DIBASIC 236; 224 MG/ML; MG/ML
15 INJECTION, SOLUTION INTRAVENOUS ONCE
Qty: 0 | Refills: 0 | Status: COMPLETED | OUTPATIENT
Start: 2018-11-23 | End: 2018-11-23

## 2018-11-23 RX ORDER — MAGNESIUM SULFATE 500 MG/ML
1 VIAL (ML) INJECTION ONCE
Qty: 0 | Refills: 0 | Status: COMPLETED | OUTPATIENT
Start: 2018-11-23 | End: 2018-11-23

## 2018-11-23 RX ORDER — POTASSIUM PHOSPHATE, MONOBASIC POTASSIUM PHOSPHATE, DIBASIC 236; 224 MG/ML; MG/ML
30 INJECTION, SOLUTION INTRAVENOUS ONCE
Qty: 0 | Refills: 0 | Status: DISCONTINUED | OUTPATIENT
Start: 2018-11-23 | End: 2018-11-23

## 2018-11-23 RX ADMIN — Medication 5 MILLIGRAM(S): at 23:14

## 2018-11-23 RX ADMIN — DEXTROSE MONOHYDRATE, SODIUM CHLORIDE, AND POTASSIUM CHLORIDE 75 MILLILITER(S): 50; .745; 4.5 INJECTION, SOLUTION INTRAVENOUS at 21:38

## 2018-11-23 RX ADMIN — ENOXAPARIN SODIUM 40 MILLIGRAM(S): 100 INJECTION SUBCUTANEOUS at 12:31

## 2018-11-23 RX ADMIN — Medication 5 MILLIGRAM(S): at 18:00

## 2018-11-23 RX ADMIN — POTASSIUM PHOSPHATE, MONOBASIC POTASSIUM PHOSPHATE, DIBASIC 62.5 MILLIMOLE(S): 236; 224 INJECTION, SOLUTION INTRAVENOUS at 13:59

## 2018-11-23 RX ADMIN — Medication 5 MILLIGRAM(S): at 00:31

## 2018-11-23 RX ADMIN — Medication 100 GRAM(S): at 12:26

## 2018-11-23 RX ADMIN — DEXTROSE MONOHYDRATE, SODIUM CHLORIDE, AND POTASSIUM CHLORIDE 75 MILLILITER(S): 50; .745; 4.5 INJECTION, SOLUTION INTRAVENOUS at 08:02

## 2018-11-23 RX ADMIN — Medication 5 MILLIGRAM(S): at 06:36

## 2018-11-23 RX ADMIN — Medication 5 MILLIGRAM(S): at 12:31

## 2018-11-23 NOTE — PROGRESS NOTE ADULT - SUBJECTIVE AND OBJECTIVE BOX
Patient is a 89y old  Female who presents with a chief complaint of Abdominal pain, vomiting (22 Nov 2018 15:05)      INTERVAL HPI/OVERNIGHT EVENTS:  T(C): 36.7 (11-23-18 @ 05:42), Max: 37.4 (11-22-18 @ 14:05)  HR: 71 (11-23-18 @ 05:42) (69 - 71)  BP: 153/62 (11-23-18 @ 05:42) (153/62 - 165/76)  RR: 17 (11-23-18 @ 05:42) (16 - 17)  SpO2: 93% (11-23-18 @ 05:42) (90% - 93%)  Wt(kg): --  I&O's Summary      LABS:                        13.9   8.74  )-----------( 174      ( 23 Nov 2018 08:40 )             40.6     11-23    145  |  108  |  20  ----------------------------<  80  3.4<L>   |  25  |  0.43<L>    Ca    7.8<L>      23 Nov 2018 08:40  Phos  2.4     11-22  Mg     1.9     11-22          CAPILLARY BLOOD GLUCOSE                MEDICATIONS  (STANDING):  enoxaparin Injectable 40 milliGRAM(s) SubCutaneous daily  metoprolol tartrate Injectable 5 milliGRAM(s) IV Push every 6 hours  sodium chloride 0.45% with potassium chloride 20 mEq/L 1000 milliLiter(s) (75 mL/Hr) IV Continuous <Continuous>    MEDICATIONS  (PRN):  haloperidol    Injectable 0.5 milliGRAM(s) IntraMuscular every 6 hours PRN Agitation  ondansetron Injectable 4 milliGRAM(s) IV Push every 6 hours PRN Nausea and/or Vomiting      REVIEW OF SYSTEMS:  CONSTITUTIONAL: No fever, weight loss, or fatigue  EYES: No eye pain, visual disturbances, or discharge  ENMT:  No difficulty hearing, tinnitus, vertigo; No sinus or throat pain  NECK: No pain or stiffness  RESPIRATORY: No cough, wheezing, chills or hemoptysis; No shortness of breath  CARDIOVASCULAR: No chest pain, palpitations, dizziness, or leg swelling  GASTROINTESTINAL: No abdominal or epigastric pain. No nausea, vomiting, or hematemesis; No diarrhea or constipation. No melena or hematochezia.  GENITOURINARY: No dysuria, frequency, hematuria, or incontinence  NEUROLOGICAL: No headaches, memory loss, loss of strength, numbness, or tremors  SKIN: No itching, burning, rashes, or lesions   MUSCULOSKELETAL: No joint pain or swelling; No muscle, back, or extremity pain  PSYCHIATRIC: No depression, anxiety, mood swings, or difficulty sleeping    RADIOLOGY & ADDITIONAL TESTS:    Imaging Personally Reviewed:  [ ] YES  [ ] NO    Consultant(s) Notes Reviewed:  [ ] YES  [ ] NO    PHYSICAL EXAM:  GENERAL: NAD, well-groomed, well-developed  HEAD:  Atraumatic, Normocephalic  EYES: EOMI, PERRLA, conjunctiva and sclera clear  ENMT: No tonsillar erythema, exudates, or enlargement; Moist mucous membranes, Good dentition, No lesions  NECK: Supple, No JVD, Normal thyroid  NERVOUS SYSTEM:  Alert & Oriented X3, Good concentration; Motor Strength 5/5 B/L upper and lower extremities; DTRs 2+ intact and symmetric  CHEST/LUNG: Clear to auscultation bilaterally; No rales, rhonchi, wheezing, or rubs  HEART: Regular rate and rhythm; No murmurs, rubs, or gallops  ABDOMEN: Soft, Nontender, Nondistended; Bowel sounds present  EXTREMITIES:  2+ Peripheral Pulses, No clubbing, cyanosis, or edema  LYMPH: No lymphadenopathy noted  SKIN: No rashes or lesions    Care Discussed with Consultants/Other Providers [ ] YES  [ ] NO Patient is a 89y old  Female who presents with a chief complaint of Abdominal pain, vomiting (22 Nov 2018 15:05)      INTERVAL HPI/OVERNIGHT EVENTS: NGT drained bilious fluid overnight, continues to drain currently. One canister to suction filled and removed. Pt slept well otherwise. Denies BM or flatulence, but states she is urinating well. Denies any abdominal pain, fever, or chills.    T(C): 36.7 (11-23-18 @ 05:42), Max: 37.4 (11-22-18 @ 14:05)  HR: 71 (11-23-18 @ 05:42) (69 - 71)  BP: 153/62 (11-23-18 @ 05:42) (153/62 - 165/76)  RR: 17 (11-23-18 @ 05:42) (16 - 17)  SpO2: 93% (11-23-18 @ 05:42) (90% - 93%)  Wt(kg): --  I&O's Summary      LABS:                        13.9   8.74  )-----------( 174      ( 23 Nov 2018 08:40 )             40.6     11-23    145  |  108  |  20  ----------------------------<  80  3.4<L>   |  25  |  0.43<L>    Ca    7.8<L>      23 Nov 2018 08:40  Phos  2.4     11-22  Mg     1.9     11-22          CAPILLARY BLOOD GLUCOSE                MEDICATIONS  (STANDING):  enoxaparin Injectable 40 milliGRAM(s) SubCutaneous daily  metoprolol tartrate Injectable 5 milliGRAM(s) IV Push every 6 hours  sodium chloride 0.45% with potassium chloride 20 mEq/L 1000 milliLiter(s) (75 mL/Hr) IV Continuous <Continuous>    MEDICATIONS  (PRN):  haloperidol    Injectable 0.5 milliGRAM(s) IntraMuscular every 6 hours PRN Agitation  ondansetron Injectable 4 milliGRAM(s) IV Push every 6 hours PRN Nausea and/or Vomiting      REVIEW OF SYSTEMS:  CONSTITUTIONAL: No fever, weight loss, or fatigue  EYES: No eye pain, visual disturbances, or discharge  ENMT:  No difficulty hearing, tinnitus, vertigo; No sinus or throat pain  NECK: No pain or stiffness  RESPIRATORY: No cough, wheezing, chills or hemoptysis; No shortness of breath  CARDIOVASCULAR: No chest pain, palpitations, dizziness, or leg swelling  GASTROINTESTINAL: No abdominal or epigastric pain. No nausea, vomiting, or hematemesis; No diarrhea or constipation. No melena or hematochezia.  GENITOURINARY: No dysuria, frequency, hematuria, or incontinence  NEUROLOGICAL: No headaches, memory loss, loss of strength, numbness, or tremors  SKIN: No itching, burning, rashes, or lesions   MUSCULOSKELETAL: (+) pain B/L LE  PSYCHIATRIC: No depression, anxiety, mood swings, or difficulty sleeping    RADIOLOGY & ADDITIONAL TESTS:    Imaging Personally Reviewed:  [x ] YES  [ ] NO    Consultant(s) Notes Reviewed:  [x ] YES  [ ] NO    PHYSICAL EXAM:  GENERAL: elderly female NAD  HEAD:  Atraumatic, Normocephalic  EYES: EOMI, PERRLA, conjunctiva and sclera clear  ENMT: No tonsillar erythema, exudates, or enlargement; Moist mucous membranes. NGT present R nare, draining bilious fluid  NECK: Supple, No JVD, Normal thyroid  NERVOUS SYSTEM:  Alert & Oriented X1, Good concentration; Motor Strength 5/5 B/L upper and lower extremities; DTRs 2+ intact and symmetric  CHEST/LUNG: Clear to auscultation bilaterally; No rales, rhonchi, wheezing, or rubs  HEART: Regular rate and rhythm; No murmurs, rubs, or gallops  ABDOMEN: Soft, Nontender, Nondistended; Bowel sounds present  EXTREMITIES:  2+ Peripheral Pulses, No clubbing, cyanosis, or edema  LYMPH: No lymphadenopathy noted  SKIN: No rashes or lesions    Care Discussed with Consultants/Other Providers [x ] YES  [ ] NO

## 2018-11-23 NOTE — PROGRESS NOTE ADULT - SUBJECTIVE AND OBJECTIVE BOX
pt seen  c/o NGT  ICU Vital Signs Last 24 Hrs  T(C): 36.7 (23 Nov 2018 05:42), Max: 37.4 (22 Nov 2018 14:05)  T(F): 98 (23 Nov 2018 05:42), Max: 99.3 (22 Nov 2018 14:05)  HR: 71 (23 Nov 2018 05:42) (69 - 71)  BP: 153/62 (23 Nov 2018 05:42) (153/62 - 165/76)  BP(mean): --  ABP: --  ABP(mean): --  RR: 17 (23 Nov 2018 05:42) (16 - 17)  SpO2: 93% (23 Nov 2018 05:42) (90% - 93%)  gen-NAD  resp-clear  abd-soft, mild distension, Nontender                          13.9   8.74  )-----------( 174      ( 23 Nov 2018 08:40 )             40.6   11-23    145  |  108  |  20  ----------------------------<  80  3.4<L>   |  25  |  0.43<L>    Ca    7.8<L>      23 Nov 2018 08:40  Phos  2.2     11-23  Mg     1.9     11-23      I and os-nurse reported 700 removed

## 2018-11-23 NOTE — PROGRESS NOTE ADULT - SUBJECTIVE AND OBJECTIVE BOX
Neurology Follow up note    ROD POST89yFemale    HPI:  89 YO F PMHx HTN, visual/auditory hallucinations, s/p cholecystectomy, s/p WILL, L inguinal hernia who presents to ED with chief complaint of abdominal pain and vomiting. Patient states that 2 days ago, she began having abdominal pain. This pain resolved, patient was able to eat and have BMs. Today after eating dinner around 6 pm, patient began having worsening abdominal pain, nausea and vomiting. States she vomited 3-4 times with brownish liquid consistency, NBNB. Patient endorses BM this morning. She denies recent change in stool caliber or consistency, no melena, no hematochezia, no fever/chills. Denies recent travel or change in diet. Of note, patient states she does have issues with chronic constipation and sometimes diarrhea, questionable history of IBS. Patient takes miralax almost daily when constipated. Denies HAs, CP, SOB. Patient mentions that she has L inguinal hernia, has been seen by Dr. Fernandez for it, no intervention to date.     In the ED, vitals T 97.6F, HR 84, /83, RR 19, 97% on RA. CBC normal WBC with left shift 86.4% neutrophils and CMP WNL. UA small ketones, small blood, 3-5 RBCs, few bacteria. CT Abdomen and Pelvis Small bowel obstruction with the transition point in the left mid abdomen near the abdominal wall. Bowel containing left inguinal hernia without strangulation or incarceration. EKG: NSR (unofficial read)    Given IV zofran 4mg, IV tylenol x 1 and 1L NS bolus. (18 Nov 2018 01:05)      Interval History -no new events.    Patient is seen, chart was reviewed and case was discussed with the treatment team.  Pt is not in any distress.   Lying on bed comfortably.   No events reported overnight.   No clinical seizure was reported.  Sitting on chair bed comfortably.    is at bedside.    Vital Signs Last 24 Hrs  T(C): 36.7 (23 Nov 2018 13:20), Max: 36.8 (22 Nov 2018 21:01)  T(F): 98 (23 Nov 2018 13:20), Max: 98.3 (22 Nov 2018 21:01)  HR: 76 (23 Nov 2018 13:20) (71 - 76)  BP: 164/73 (23 Nov 2018 13:20) (153/62 - 165/76)  BP(mean): --  RR: 17 (23 Nov 2018 13:20) (16 - 17)  SpO2: 90% (23 Nov 2018 13:20) (90% - 93%)        REVIEW OF SYSTEMS:  limited due dementia.      On Neurological Examination:    Mental Status - Pt is alert, awake, oriented X1     Speech -  Normal.     Cranial Nerves - Pupils 3 mm equal and reactive to light, extraocular eye movements intact. Pt has no visual field deficit.  Pt has no facial asymmetry. Facial sensation is intact.Tongue - is in midline.    Muscle tone - is normal.     Motor Exam - 5/5 of UE.  LE 4+/5  No drift.      Sensory Exam - Pt withdraws all extremities equally on stimulation. No asymmetry seen.      coordination:    Finger to nose: normal      Deep tendon Reflexes - 2 plus all over.        Romberg - Negative.    Neck Supple -  Yes.     MEDICATIONS    enoxaparin Injectable 40 milliGRAM(s) SubCutaneous daily  haloperidol    Injectable 0.5 milliGRAM(s) IntraMuscular every 6 hours PRN  metoprolol tartrate Injectable 5 milliGRAM(s) IV Push every 6 hours  ondansetron Injectable 4 milliGRAM(s) IV Push every 6 hours PRN  sodium chloride 0.9% 1000 milliLiter(s) IV Continuous <Continuous>      Allergies    Demerol (Unknown)    Intolerances        LABS:  CBC Full  -  ( 22 Nov 2018 08:35 )  WBC Count : 9.44 K/uL  Hemoglobin : 13.3 g/dL  Hematocrit : 38.8 %  Platelet Count - Automated : 146 K/uL  Mean Cell Volume : 93.3 fl  Mean Cell Hemoglobin : 32.0 pg  Mean Cell Hemoglobin Concentration : 34.3 gm/dL  Auto Neutrophil # : 8.47 K/uL  Auto Lymphocyte # : 0.58 K/uL  Auto Monocyte # : 0.36 K/uL  Auto Eosinophil # : 0.00 K/uL        11-22    147<H>  |  110<H>  |  21  ----------------------------<  90  3.4<L>   |  27  |  0.52    Ca    7.8<L>      22 Nov 2018 08:35  Phos  2.4     11-22  Mg     1.9     11-22      Hemoglobin A1C:     Vitamin B12     RADIOLOGY    ASSESSMENT AND PLAN:      DEMENTIA WITH BEHAVIORAL DISTURBANCE.  SBO on NGT SUCTION.    HALDOL PRN FOR AGITATION.  AVOID BENZO AS SHE HAS SBO.  Physical therapy evaluation.  Advanced care planning was discussed with family.  Pain is accessed and addressed.  Would continue to follow.

## 2018-11-23 NOTE — PROGRESS NOTE ADULT - PROBLEM SELECTOR PLAN 2
- resolved, pt afebrile  - pt without evidence for acute abdomen, denies pain. Abd NT, ND.   - continue to monitor, f/u AM CBC

## 2018-11-23 NOTE — PROGRESS NOTE ADULT - PROBLEM SELECTOR PLAN 3
pt with hx of sundowning, agitation likely 2/2 worsening dementia  - refusing interventions, imaging, etc at nighttime, however is amenable to most interventions during day.   - c/w Haldol 0.5mg IM q6h PRN for agitation.

## 2018-11-23 NOTE — PROGRESS NOTE ADULT - PROBLEM SELECTOR PLAN 1
events as noted  - surgery following (Dr. Mauricio), recs c/w NGT, NPO, IVF. Observe for improvement/resolution of SBO over weekend, with plan for possible surgical intervention Monday if no improvement. events as noted  - KUB 11/23 showing there remains air-filled dilated loops of small bowel which are more pronounced on the current study. No free air is notable. Persistent SBO.  - surgery following (Dr. Mauricio), recs c/w NGT, NPO, IVF. Observe for improvement/resolution of SBO over weekend, with plan for possible surgical intervention Monday if no improvement.

## 2018-11-23 NOTE — PROGRESS NOTE ADULT - ASSESSMENT
Pt is a 89 y/o F with PMHx HTN, visual/auditory hallucinations, s/p cholecystectomy, s/p WILL, L inguinal hernia admitted to [facility] for conservative vs surgical management of SBO.

## 2018-11-24 LAB
ALBUMIN SERPL ELPH-MCNC: 2.3 G/DL — LOW (ref 3.3–5)
ALP SERPL-CCNC: 37 U/L — LOW (ref 40–120)
ALT FLD-CCNC: 16 U/L — SIGNIFICANT CHANGE UP (ref 12–78)
ANION GAP SERPL CALC-SCNC: 10 MMOL/L — SIGNIFICANT CHANGE UP (ref 5–17)
AST SERPL-CCNC: 16 U/L — SIGNIFICANT CHANGE UP (ref 15–37)
BASOPHILS # BLD AUTO: 0.01 K/UL — SIGNIFICANT CHANGE UP (ref 0–0.2)
BASOPHILS NFR BLD AUTO: 0.1 % — SIGNIFICANT CHANGE UP (ref 0–2)
BILIRUB SERPL-MCNC: 1.5 MG/DL — HIGH (ref 0.2–1.2)
BUN SERPL-MCNC: 17 MG/DL — SIGNIFICANT CHANGE UP (ref 7–23)
CALCIUM SERPL-MCNC: 7.6 MG/DL — LOW (ref 8.5–10.1)
CHLORIDE SERPL-SCNC: 103 MMOL/L — SIGNIFICANT CHANGE UP (ref 96–108)
CO2 SERPL-SCNC: 29 MMOL/L — SIGNIFICANT CHANGE UP (ref 22–31)
CREAT SERPL-MCNC: 0.38 MG/DL — LOW (ref 0.5–1.3)
EOSINOPHIL # BLD AUTO: 0 K/UL — SIGNIFICANT CHANGE UP (ref 0–0.5)
EOSINOPHIL NFR BLD AUTO: 0 % — SIGNIFICANT CHANGE UP (ref 0–6)
GLUCOSE SERPL-MCNC: 81 MG/DL — SIGNIFICANT CHANGE UP (ref 70–99)
HCT VFR BLD CALC: 41 % — SIGNIFICANT CHANGE UP (ref 34.5–45)
HGB BLD-MCNC: 14.2 G/DL — SIGNIFICANT CHANGE UP (ref 11.5–15.5)
IMM GRANULOCYTES NFR BLD AUTO: 0.4 % — SIGNIFICANT CHANGE UP (ref 0–1.5)
LYMPHOCYTES # BLD AUTO: 0.86 K/UL — LOW (ref 1–3.3)
LYMPHOCYTES # BLD AUTO: 10.5 % — LOW (ref 13–44)
MAGNESIUM SERPL-MCNC: 1.8 MG/DL — SIGNIFICANT CHANGE UP (ref 1.6–2.6)
MCHC RBC-ENTMCNC: 31.9 PG — SIGNIFICANT CHANGE UP (ref 27–34)
MCHC RBC-ENTMCNC: 34.6 GM/DL — SIGNIFICANT CHANGE UP (ref 32–36)
MCV RBC AUTO: 92.1 FL — SIGNIFICANT CHANGE UP (ref 80–100)
MONOCYTES # BLD AUTO: 0.4 K/UL — SIGNIFICANT CHANGE UP (ref 0–0.9)
MONOCYTES NFR BLD AUTO: 4.9 % — SIGNIFICANT CHANGE UP (ref 2–14)
NEUTROPHILS # BLD AUTO: 6.91 K/UL — SIGNIFICANT CHANGE UP (ref 1.8–7.4)
NEUTROPHILS NFR BLD AUTO: 84.1 % — HIGH (ref 43–77)
PHOSPHATE SERPL-MCNC: 2.7 MG/DL — SIGNIFICANT CHANGE UP (ref 2.5–4.5)
PLATELET # BLD AUTO: 205 K/UL — SIGNIFICANT CHANGE UP (ref 150–400)
POTASSIUM SERPL-MCNC: 3.4 MMOL/L — LOW (ref 3.5–5.3)
POTASSIUM SERPL-SCNC: 3.4 MMOL/L — LOW (ref 3.5–5.3)
PROT SERPL-MCNC: 5.8 G/DL — LOW (ref 6–8.3)
RBC # BLD: 4.45 M/UL — SIGNIFICANT CHANGE UP (ref 3.8–5.2)
RBC # FLD: 11.9 % — SIGNIFICANT CHANGE UP (ref 10.3–14.5)
SODIUM SERPL-SCNC: 142 MMOL/L — SIGNIFICANT CHANGE UP (ref 135–145)
WBC # BLD: 8.21 K/UL — SIGNIFICANT CHANGE UP (ref 3.8–10.5)
WBC # FLD AUTO: 8.21 K/UL — SIGNIFICANT CHANGE UP (ref 3.8–10.5)

## 2018-11-24 PROCEDURE — 74018 RADEX ABDOMEN 1 VIEW: CPT | Mod: 26

## 2018-11-24 RX ADMIN — Medication 5 MILLIGRAM(S): at 05:42

## 2018-11-24 RX ADMIN — Medication 5 MILLIGRAM(S): at 17:38

## 2018-11-24 RX ADMIN — DEXTROSE MONOHYDRATE, SODIUM CHLORIDE, AND POTASSIUM CHLORIDE 75 MILLILITER(S): 50; .745; 4.5 INJECTION, SOLUTION INTRAVENOUS at 14:02

## 2018-11-24 RX ADMIN — Medication 5 MILLIGRAM(S): at 12:48

## 2018-11-24 RX ADMIN — ENOXAPARIN SODIUM 40 MILLIGRAM(S): 100 INJECTION SUBCUTANEOUS at 12:49

## 2018-11-24 NOTE — PROGRESS NOTE ADULT - SUBJECTIVE AND OBJECTIVE BOX
pt seen  feeling better  -n-v  ICU Vital Signs Last 24 Hrs  T(C): 36.3 (24 Nov 2018 05:10), Max: 37 (23 Nov 2018 20:26)  T(F): 97.4 (24 Nov 2018 05:10), Max: 98.6 (23 Nov 2018 20:26)  HR: 72 (24 Nov 2018 05:10) (72 - 76)  BP: 161/80 (24 Nov 2018 05:10) (155/73 - 164/73)  BP(mean): --  ABP: --  ABP(mean): --  RR: 16 (24 Nov 2018 05:10) (16 - 17)  SpO2: 93% (24 Nov 2018 05:10) (90% - 94%)  gen-NAD  resp-clear  abd-soft NT/ND    I&O's Detail    23 Nov 2018 07:01  -  24 Nov 2018 07:00  --------------------------------------------------------  IN:  Total IN: 0 mL    OUT:    Nasoenteral Tube: 750 mL  Total OUT: 750 mL    Total NET: -750 mL

## 2018-11-24 NOTE — CHART NOTE - NSCHARTNOTEFT_GEN_A_CORE
Assessment: Pt seen for nutrition follow up due to prolonged NPO status. Chart reviewed, hospital course noted. Per chart, pt is 89 Y/O F with HTN, visual/auditory hallucinations, s/p cholecystectomy, s/p WILL, L inguinal hernia who presented to ED with chief complaint of abdominal pain and vomiting.     Pt alert and confused during time of interview with daughter at bedside. Pt denies current N/V/diarrhea/constipation. Still no BM since 11/19. MOLST noted - no TF desired, trial of IV fluids okay. Per daughter, pt vomiting once NGT was previously removed and trial of clear liquid diet sent. Per chart, pt refusing surgical intervention at this time, conservative management of SBO. NGT with suction, 750 ml of brown liquid 11/24.    Factors impacting intake: [ ] none [ ] nausea  [ ] vomiting [ ] diarrhea [ ] constipation  [ ]chewing problems [ ] swallowing issues  [X] other: abdominal pain, intermittent nausea/vomiting, NPO with SBO.    Diet, NPO (11-21-18 @ 03:45)    Intake: none, pt remains NPO. Pt was briefly on clear liquids 4 days ago with NGT trial however vomited.     Current Weight: 120.1 pounds (11/20) - no new weight since. Will obtain bedscale (pt seated in chair).     Pertinent Medications: MEDICATIONS  (STANDING):  enoxaparin Injectable 40 milliGRAM(s) SubCutaneous daily  metoprolol tartrate Injectable 5 milliGRAM(s) IV Push every 6 hours  sodium chloride 0.45% with potassium chloride 20 mEq/L 1000 milliLiter(s) (75 mL/Hr) IV Continuous <Continuous>    MEDICATIONS  (PRN):  haloperidol    Injectable 0.5 milliGRAM(s) IntraMuscular every 6 hours PRN Agitation  ondansetron Injectable 4 milliGRAM(s) IV Push every 6 hours PRN Nausea and/or Vomiting    Pertinent Labs: 11-24 Na142 mmol/L Glu 81 mg/dL K+ 3.4 mmol/L<L> Cr  0.38 mg/dL<L> BUN 17 mg/dL 11-24 Phos 2.7 mg/dL 11-24 Alb 2.3 g/dL<L>     CAPILLARY BLOOD GLUCOSE        Skin: +1 BL ankle edema    Estimated Needs:   [X] no change since previous assessment  [ ] recalculated:     Previous Nutrition Diagnosis:   [X] Altered GI Function    Nutrition Diagnosis is [X] ongoing  [ ] resolved [ ] not applicable     New Nutrition Diagnosis: [X] not applicable       Interventions:   Recommend  [X] Change Diet To: Advance diet to clear liquids as medically feasible. Monitor tolerance of prescribed diet.   [ ] Nutrition Supplement  [X] Nutrition Support: If pt continues with inability to consume po intake, consider alternate means of nutrition (such as TPN) if aligned with pt's GOC and best medical treatment given advanced age.   [] Other:     Monitoring and Evaluation:   [ ] PO intake [ x ] Tolerance to diet prescription [ x ] weights [ x ] labs[ x ] follow up per protocol  [X] other: RD to remain available.

## 2018-11-24 NOTE — PROGRESS NOTE ADULT - SUBJECTIVE AND OBJECTIVE BOX
Patient is a 89y old  Female who presents with a chief complaint of Abdominal pain, vomiting (23 Nov 2018 14:16)      INTERVAL HPI/OVERNIGHT EVENTS:  T(C): 36.3 (11-24-18 @ 05:10), Max: 37 (11-23-18 @ 20:26)  HR: 72 (11-24-18 @ 05:10) (72 - 76)  BP: 161/80 (11-24-18 @ 05:10) (155/73 - 164/73)  RR: 16 (11-24-18 @ 05:10) (16 - 17)  SpO2: 93% (11-24-18 @ 05:10) (90% - 94%)  Wt(kg): --  I&O's Summary    23 Nov 2018 07:01  -  24 Nov 2018 07:00  --------------------------------------------------------  IN: 0 mL / OUT: 750 mL / NET: -750 mL        LABS:                        13.9   8.74  )-----------( 174      ( 23 Nov 2018 08:40 )             40.6     11-23    145  |  108  |  20  ----------------------------<  80  3.4<L>   |  25  |  0.43<L>    Ca    7.8<L>      23 Nov 2018 08:40  Phos  2.2     11-23  Mg     1.9     11-23          CAPILLARY BLOOD GLUCOSE                MEDICATIONS  (STANDING):  enoxaparin Injectable 40 milliGRAM(s) SubCutaneous daily  metoprolol tartrate Injectable 5 milliGRAM(s) IV Push every 6 hours  sodium chloride 0.45% with potassium chloride 20 mEq/L 1000 milliLiter(s) (75 mL/Hr) IV Continuous <Continuous>    MEDICATIONS  (PRN):  haloperidol    Injectable 0.5 milliGRAM(s) IntraMuscular every 6 hours PRN Agitation  ondansetron Injectable 4 milliGRAM(s) IV Push every 6 hours PRN Nausea and/or Vomiting      REVIEW OF SYSTEMS:  CONSTITUTIONAL: No fever, weight loss, or fatigue  EYES: No eye pain, visual disturbances, or discharge  ENMT:  No difficulty hearing, tinnitus, vertigo; No sinus or throat pain  NECK: No pain or stiffness  RESPIRATORY: No cough, wheezing, chills or hemoptysis; No shortness of breath  CARDIOVASCULAR: No chest pain, palpitations, dizziness, or leg swelling  GASTROINTESTINAL: No abdominal or epigastric pain. No nausea, vomiting, or hematemesis; No diarrhea or constipation. No melena or hematochezia.  GENITOURINARY: No dysuria, frequency, hematuria, or incontinence  NEUROLOGICAL: No headaches, memory loss, loss of strength, numbness, or tremors  SKIN: No itching, burning, rashes, or lesions   LYMPH NODES: No enlarged glands  ENDOCRINE: No heat or cold intolerance; No hair loss  MUSCULOSKELETAL: No joint pain or swelling; No muscle, back, or extremity pain  PSYCHIATRIC: No depression, anxiety, mood swings, or difficulty sleeping  HEME/LYMPH: No easy bruising, or bleeding gums  ALLERY AND IMMUNOLOGIC: No hives or eczema    RADIOLOGY & ADDITIONAL TESTS:    Imaging Personally Reviewed:  [ ] YES  [ ] NO    Consultant(s) Notes Reviewed:  [ ] YES  [ ] NO    PHYSICAL EXAM:  GENERAL: NAD, well-groomed, well-developed  HEAD:  Atraumatic, Normocephalic  EYES: EOMI, PERRLA, conjunctiva and sclera clear  ENMT: No tonsillar erythema, exudates, or enlargement; Moist mucous membranes, Good dentition, No lesions  NECK: Supple, No JVD, Normal thyroid  NERVOUS SYSTEM:  Alert & Oriented X3, Good concentration; Motor Strength 5/5 B/L upper and lower extremities; DTRs 2+ intact and symmetric  CHEST/LUNG: Clear to percussion bilaterally; No rales, rhonchi, wheezing, or rubs  HEART: Regular rate and rhythm; No murmurs, rubs, or gallops  ABDOMEN: Soft, Nontender, Nondistended; Bowel sounds present  EXTREMITIES:  2+ Peripheral Pulses, No clubbing, cyanosis, or edema  LYMPH: No lymphadenopathy noted  SKIN: No rashes or lesions    Care Discussed with Consultants/Other Providers [ ] YES  [ ] NO

## 2018-11-24 NOTE — PROGRESS NOTE ADULT - SUBJECTIVE AND OBJECTIVE BOX
Neurology Follow up note    ROD POST89yFemale    HPI:  89 YO F PMHx HTN, visual/auditory hallucinations, s/p cholecystectomy, s/p WILL, L inguinal hernia who presents to ED with chief complaint of abdominal pain and vomiting. Patient states that 2 days ago, she began having abdominal pain. This pain resolved, patient was able to eat and have BMs. Today after eating dinner around 6 pm, patient began having worsening abdominal pain, nausea and vomiting. States she vomited 3-4 times with brownish liquid consistency, NBNB. Patient endorses BM this morning. She denies recent change in stool caliber or consistency, no melena, no hematochezia, no fever/chills. Denies recent travel or change in diet. Of note, patient states she does have issues with chronic constipation and sometimes diarrhea, questionable history of IBS. Patient takes miralax almost daily when constipated. Denies HAs, CP, SOB. Patient mentions that she has L inguinal hernia, has been seen by Dr. Fenrandez for it, no intervention to date.     In the ED, vitals T 97.6F, HR 84, /83, RR 19, 97% on RA. CBC normal WBC with left shift 86.4% neutrophils and CMP WNL. UA small ketones, small blood, 3-5 RBCs, few bacteria. CT Abdomen and Pelvis Small bowel obstruction with the transition point in the left mid abdomen near the abdominal wall. Bowel containing left inguinal hernia without strangulation or incarceration. EKG: NSR (unofficial read)    Given IV zofran 4mg, IV tylenol x 1 and 1L NS bolus. (18 Nov 2018 01:05)      Interval History -appears to calmer.    Patient is seen, chart was reviewed and case was discussed with the treatment team.  Pt is not in any distress.   Lying on bed comfortably.   No events reported overnight.   No clinical seizure was reported.  Sitting on chair bed comfortably.    is at bedside.    Vital Signs Last 24 Hrs  T(C): 36.7 (24 Nov 2018 11:44), Max: 37 (23 Nov 2018 20:26)  T(F): 98 (24 Nov 2018 11:44), Max: 98.6 (23 Nov 2018 20:26)  HR: 72 (24 Nov 2018 11:44) (72 - 73)  BP: 149/69 (24 Nov 2018 11:44) (149/69 - 161/80)  BP(mean): --  RR: 17 (24 Nov 2018 11:44) (16 - 17)  SpO2: 93% (24 Nov 2018 11:44) (93% - 94%)        REVIEW OF SYSTEMS:  limited due dementia.      On Neurological Examination:    Mental Status - Pt is alert, awake, oriented X1     Speech -  Normal.     Cranial Nerves - Pupils 3 mm equal and reactive to light, extraocular eye movements intact. Pt has no visual field deficit.  Pt has no facial asymmetry. Facial sensation is intact.Tongue - is in midline.    Muscle tone - is normal.     Motor Exam - 5/5 of UE.  LE 4+/5  No drift.      Sensory Exam - Pt withdraws all extremities equally on stimulation. No asymmetry seen.      coordination:    Finger to nose: normal      Deep tendon Reflexes - 2 plus all over.        Romberg - Negative.    Neck Supple -  Yes.     MEDICATIONS    enoxaparin Injectable 40 milliGRAM(s) SubCutaneous daily  haloperidol    Injectable 0.5 milliGRAM(s) IntraMuscular every 6 hours PRN  metoprolol tartrate Injectable 5 milliGRAM(s) IV Push every 6 hours  ondansetron Injectable 4 milliGRAM(s) IV Push every 6 hours PRN  sodium chloride 0.9% 1000 milliLiter(s) IV Continuous <Continuous>      Allergies    Demerol (Unknown)    Intolerances                  Hemoglobin A1C:     Vitamin B12     RADIOLOGY    ASSESSMENT AND PLAN:      DEMENTIA WITH BEHAVIORAL DISTURBANCE.  SBO on CONTINUOUS NGT SUCTION.    HALDOL PRN FOR AGITATION.  SURGERY FOLLOW UP.  Physical therapy evaluation.  Advanced care planning was discussed with family.  Pain is accessed and addressed.  Would continue to follow.

## 2018-11-24 NOTE — PROGRESS NOTE ADULT - ASSESSMENT
89 yo with sbo. Still no flatus or BM.  D/W pt possibility of not resolving with conservative management.  PT refusing surgery at this time      Cont NGT/NPO      MEET ROSEB

## 2018-11-25 PROCEDURE — 74018 RADEX ABDOMEN 1 VIEW: CPT | Mod: 26

## 2018-11-25 RX ADMIN — Medication 5 MILLIGRAM(S): at 17:40

## 2018-11-25 RX ADMIN — Medication 5 MILLIGRAM(S): at 06:12

## 2018-11-25 RX ADMIN — Medication 5 MILLIGRAM(S): at 12:18

## 2018-11-25 RX ADMIN — ENOXAPARIN SODIUM 40 MILLIGRAM(S): 100 INJECTION SUBCUTANEOUS at 12:18

## 2018-11-25 RX ADMIN — DEXTROSE MONOHYDRATE, SODIUM CHLORIDE, AND POTASSIUM CHLORIDE 75 MILLILITER(S): 50; .745; 4.5 INJECTION, SOLUTION INTRAVENOUS at 17:36

## 2018-11-25 NOTE — PROGRESS NOTE ADULT - SUBJECTIVE AND OBJECTIVE BOX
pt seen  denies flatus or BM, but feels urgency right now  ICU Vital Signs Last 24 Hrs  T(C): 36.4 (25 Nov 2018 05:10), Max: 36.7 (24 Nov 2018 11:44)  T(F): 97.5 (25 Nov 2018 05:10), Max: 98 (24 Nov 2018 11:44)  HR: 79 (25 Nov 2018 05:10) (72 - 79)  BP: 158/85 (25 Nov 2018 05:10) (132/80 - 158/85)  BP(mean): --  ABP: --  ABP(mean): --  RR: 16 (25 Nov 2018 05:10) (16 - 18)  SpO2: 93% (25 Nov 2018 05:10) (92% - 93%)  gen-NAD  resp-clear  abd-soft NT/ND    I&O's Detail    24 Nov 2018 07:01  -  25 Nov 2018 07:00  --------------------------------------------------------  IN:    sodium chloride 0.45% with potassium chloride 20 mEq/L: 900 mL  Total IN: 900 mL    OUT:    Nasoenteral Tube: 750 mL  Total OUT: 750 mL    Total NET: 150 mL

## 2018-11-25 NOTE — PROGRESS NOTE ADULT - SUBJECTIVE AND OBJECTIVE BOX
Patient is a 89y old  Female who presents with a chief complaint of Abdominal pain, vomiting (25 Nov 2018 09:56)      INTERVAL HPI/OVERNIGHT EVENTS:  T(C): 36.3 (11-25-18 @ 12:58), Max: 36.4 (11-24-18 @ 20:52)  HR: 74 (11-25-18 @ 12:58) (74 - 79)  BP: 133/65 (11-25-18 @ 12:58) (132/80 - 158/85)  RR: 17 (11-25-18 @ 12:58) (16 - 18)  SpO2: 93% (11-25-18 @ 12:58) (92% - 93%)  Wt(kg): --  I&O's Summary    24 Nov 2018 07:01  -  25 Nov 2018 07:00  --------------------------------------------------------  IN: 900 mL / OUT: 750 mL / NET: 150 mL        LABS:                        14.2   8.21  )-----------( 205      ( 24 Nov 2018 08:39 )             41.0     11-24    142  |  103  |  17  ----------------------------<  81  3.4<L>   |  29  |  0.38<L>    Ca    7.6<L>      24 Nov 2018 08:39  Phos  2.7     11-24  Mg     1.8     11-24    TPro  5.8<L>  /  Alb  2.3<L>  /  TBili  1.5<H>  /  DBili  x   /  AST  16  /  ALT  16  /  AlkPhos  37<L>  11-24        CAPILLARY BLOOD GLUCOSE                MEDICATIONS  (STANDING):  enoxaparin Injectable 40 milliGRAM(s) SubCutaneous daily  metoprolol tartrate Injectable 5 milliGRAM(s) IV Push every 6 hours  sodium chloride 0.45% with potassium chloride 20 mEq/L 1000 milliLiter(s) (75 mL/Hr) IV Continuous <Continuous>    MEDICATIONS  (PRN):  haloperidol    Injectable 0.5 milliGRAM(s) IntraMuscular every 6 hours PRN Agitation  ondansetron Injectable 4 milliGRAM(s) IV Push every 6 hours PRN Nausea and/or Vomiting      REVIEW OF SYSTEMS:  CONSTITUTIONAL: No fever, weight loss, or fatigue  EYES: No eye pain, visual disturbances, or discharge  ENMT:  No difficulty hearing, tinnitus, vertigo; No sinus or throat pain  NECK: No pain or stiffness  RESPIRATORY: No cough, wheezing, chills or hemoptysis; No shortness of breath  CARDIOVASCULAR: No chest pain, palpitations, dizziness, or leg swelling  GASTROINTESTINAL: No abdominal pain feeling hungry    MUSCULOSKELETAL: No joint pain or swelling; No muscle, back, or extremity pain      RADIOLOGY & ADDITIONAL TESTS:    Imaging Personally Reviewed:  [ ] YES  [ ] NO    Consultant(s) Notes Reviewed:  [ ] YES  [ ] NO    PHYSICAL EXAM:  NERVOUS SYSTEM:  Alert & Oriented X3, Good concentration; Motor Strength 5/5 B/L upper and lower extremities; DTRs 2+ intact and symmetric  CHEST/LUNG: Clear to percussion bilaterally; No rales, rhonchi, wheezing, or rubs  HEART: Regular rate and rhythm; No murmurs, rubs, or gallops  ABDOMEN: Soft, Nontender, Nondistended; Bowel sounds present  EXTREMITIES:  2+ Peripheral Pulses, No clubbing, cyanosis, or edema      Care Discussed with Consultants/Other Providers [ ] YES  [ ] NO

## 2018-11-25 NOTE — PROGRESS NOTE ADULT - SUBJECTIVE AND OBJECTIVE BOX
Neurology Follow up note    ROD POST89yFemale    HPI:  89 YO F PMHx HTN, visual/auditory hallucinations, s/p cholecystectomy, s/p WILL, L inguinal hernia who presents to ED with chief complaint of abdominal pain and vomiting. Patient states that 2 days ago, she began having abdominal pain. This pain resolved, patient was able to eat and have BMs. Today after eating dinner around 6 pm, patient began having worsening abdominal pain, nausea and vomiting. States she vomited 3-4 times with brownish liquid consistency, NBNB. Patient endorses BM this morning. She denies recent change in stool caliber or consistency, no melena, no hematochezia, no fever/chills. Denies recent travel or change in diet. Of note, patient states she does have issues with chronic constipation and sometimes diarrhea, questionable history of IBS. Patient takes miralax almost daily when constipated. Denies HAs, CP, SOB. Patient mentions that she has L inguinal hernia, has been seen by Dr. Fernandez for it, no intervention to date.     In the ED, vitals T 97.6F, HR 84, /83, RR 19, 97% on RA. CBC normal WBC with left shift 86.4% neutrophils and CMP WNL. UA small ketones, small blood, 3-5 RBCs, few bacteria. CT Abdomen and Pelvis Small bowel obstruction with the transition point in the left mid abdomen near the abdominal wall. Bowel containing left inguinal hernia without strangulation or incarceration. EKG: NSR (unofficial read)    Given IV zofran 4mg, IV tylenol x 1 and 1L NS bolus. (18 Nov 2018 01:05)      Interval History - doing better    Patient is seen, chart was reviewed and case was discussed with the treatment team.  Pt is not in any distress.   Lying on bed comfortably.   No events reported overnight.   No clinical seizure was reported.  Sitting on chair bed comfortably.    is at bedside.    Vital Signs Last 24 Hrs  T(C): 36.3 (25 Nov 2018 12:58), Max: 36.4 (24 Nov 2018 20:52)  T(F): 97.4 (25 Nov 2018 12:58), Max: 97.5 (24 Nov 2018 20:52)  HR: 74 (25 Nov 2018 12:58) (74 - 79)  BP: 133/65 (25 Nov 2018 12:58) (132/80 - 158/85)  BP(mean): --  RR: 17 (25 Nov 2018 12:58) (16 - 18)  SpO2: 93% (25 Nov 2018 12:58) (92% - 93%)        REVIEW OF SYSTEMS:        On Neurological Examination:    Mental Status - Pt is alert, awake, oriented X1 . Follows commands     Speech -  Normal.    Cranial Nerves - Pupils 3 mm equal and reactive to light, extraocular eye movements intact. Pt has no visual field deficit.  Pt has no  facial asymmetry. Facial sensation is intact.Tongue - is in midline.    Muscle tone - normal,.      Motor Exam - 5/5 .    Sensory Exam -. Pt withdraws all extremities equally on stimulation. No asymmetry seen. No complaints of tingling, numbness.    Gait - Able to stand and walk .      coordination:    Finger to nose: normal    Deep tendon Reflexes - 2 plus all over.          Neck Supple -  Yes.     MEDICATIONS    enoxaparin Injectable 40 milliGRAM(s) SubCutaneous daily  haloperidol    Injectable 0.5 milliGRAM(s) IntraMuscular every 6 hours PRN  metoprolol tartrate Injectable 5 milliGRAM(s) IV Push every 6 hours  ondansetron Injectable 4 milliGRAM(s) IV Push every 6 hours PRN  sodium chloride 0.45% with potassium chloride 20 mEq/L 1000 milliLiter(s) IV Continuous <Continuous>      Allergies    Demerol (Unknown)    Intolerances        LABS:  CBC Full  -  ( 24 Nov 2018 08:39 )  WBC Count : 8.21 K/uL  Hemoglobin : 14.2 g/dL  Hematocrit : 41.0 %  Platelet Count - Automated : 205 K/uL  Mean Cell Volume : 92.1 fl  Mean Cell Hemoglobin : 31.9 pg  Mean Cell Hemoglobin Concentration : 34.6 gm/dL  Auto Neutrophil # : 6.91 K/uL  Auto Lymphocyte # : 0.86 K/uL  Auto Monocyte # : 0.40 K/uL  Auto Eosinophil # : 0.00 K/uL  Auto Basophil # : 0.01 K/uL  Auto Neutrophil % : 84.1 %  Auto Lymphocyte % : 10.5 %  Auto Monocyte % : 4.9 %  Auto Eosinophil % : 0.0 %  Auto Basophil % : 0.1 %      11-24    142  |  103  |  17  ----------------------------<  81  3.4<L>   |  29  |  0.38<L>    Ca    7.6<L>      24 Nov 2018 08:39  Phos  2.7     11-24  Mg     1.8     11-24    TPro  5.8<L>  /  Alb  2.3<L>  /  TBili  1.5<H>  /  DBili  x   /  AST  16  /  ALT  16  /  AlkPhos  37<L>  11-24    Hemoglobin A1C:     Vitamin B12     RADIOLOGY    ASSESSMENT AND PLAN:      ams.  dementia with behavioral disturbance.  SBO.    HALDOL PRN FOR AGITATION.  Physical therapy evaluation.  OOB to chair/ambulation with assistance only.  Pain is accessed and addressed.  Plan of care was discussed with family. Questions answered.  Would continue to follow.

## 2018-11-25 NOTE — PROGRESS NOTE ADULT - ASSESSMENT
89 yo female with sbo.  KUB this morning showing improving. Poss BM pending      -cont NGT/NPO/IVF at this time       D/W pt poss surgery if no improvement with NGT, still refusing

## 2018-11-25 NOTE — PROGRESS NOTE ADULT - ATTENDING COMMENTS
The KUB from yesterday still showd SBO  family and patient are both refusing surgery  made aware that there may not be any other choice since the sbo is not resolving with conservative therapy  pt is acceptable risk for the much needed surgery---  will follow
sbo   ngt in place still draining  awaiting KUB from this morning.  will hold off diet   conservative therapy for sbo-----if pt does not resolve with above measures may need surgery
above appreciated .  pt with sbo. trial of clamp failed ---pt with extensive residual drained 800 cc of gastric fluid.  pt is placed back on suction.  will observe over weekend----decide on what to do re sbo at that time.  mae garvin

## 2018-11-26 LAB
ALBUMIN SERPL ELPH-MCNC: 2.2 G/DL — LOW (ref 3.3–5)
ALP SERPL-CCNC: 36 U/L — LOW (ref 40–120)
ALT FLD-CCNC: 17 U/L — SIGNIFICANT CHANGE UP (ref 12–78)
ANION GAP SERPL CALC-SCNC: 11 MMOL/L — SIGNIFICANT CHANGE UP (ref 5–17)
AST SERPL-CCNC: 19 U/L — SIGNIFICANT CHANGE UP (ref 15–37)
BASOPHILS # BLD AUTO: 0.03 K/UL — SIGNIFICANT CHANGE UP (ref 0–0.2)
BASOPHILS NFR BLD AUTO: 0.3 % — SIGNIFICANT CHANGE UP (ref 0–2)
BILIRUB SERPL-MCNC: 0.8 MG/DL — SIGNIFICANT CHANGE UP (ref 0.2–1.2)
BUN SERPL-MCNC: 14 MG/DL — SIGNIFICANT CHANGE UP (ref 7–23)
CALCIUM SERPL-MCNC: 7.3 MG/DL — LOW (ref 8.5–10.1)
CHLORIDE SERPL-SCNC: 101 MMOL/L — SIGNIFICANT CHANGE UP (ref 96–108)
CO2 SERPL-SCNC: 26 MMOL/L — SIGNIFICANT CHANGE UP (ref 22–31)
CREAT SERPL-MCNC: 0.32 MG/DL — LOW (ref 0.5–1.3)
EOSINOPHIL # BLD AUTO: 0 K/UL — SIGNIFICANT CHANGE UP (ref 0–0.5)
EOSINOPHIL NFR BLD AUTO: 0 % — SIGNIFICANT CHANGE UP (ref 0–6)
GLUCOSE SERPL-MCNC: 73 MG/DL — SIGNIFICANT CHANGE UP (ref 70–99)
HCT VFR BLD CALC: 41.3 % — SIGNIFICANT CHANGE UP (ref 34.5–45)
HGB BLD-MCNC: 14.3 G/DL — SIGNIFICANT CHANGE UP (ref 11.5–15.5)
IMM GRANULOCYTES NFR BLD AUTO: 0.8 % — SIGNIFICANT CHANGE UP (ref 0–1.5)
LYMPHOCYTES # BLD AUTO: 1.04 K/UL — SIGNIFICANT CHANGE UP (ref 1–3.3)
LYMPHOCYTES # BLD AUTO: 11.5 % — LOW (ref 13–44)
MAGNESIUM SERPL-MCNC: 1.6 MG/DL — SIGNIFICANT CHANGE UP (ref 1.6–2.6)
MCHC RBC-ENTMCNC: 31.5 PG — SIGNIFICANT CHANGE UP (ref 27–34)
MCHC RBC-ENTMCNC: 34.6 GM/DL — SIGNIFICANT CHANGE UP (ref 32–36)
MCV RBC AUTO: 91 FL — SIGNIFICANT CHANGE UP (ref 80–100)
MONOCYTES # BLD AUTO: 0.5 K/UL — SIGNIFICANT CHANGE UP (ref 0–0.9)
MONOCYTES NFR BLD AUTO: 5.5 % — SIGNIFICANT CHANGE UP (ref 2–14)
NEUTROPHILS # BLD AUTO: 7.43 K/UL — HIGH (ref 1.8–7.4)
NEUTROPHILS NFR BLD AUTO: 81.9 % — HIGH (ref 43–77)
PHOSPHATE SERPL-MCNC: 2.5 MG/DL — SIGNIFICANT CHANGE UP (ref 2.5–4.5)
PLATELET # BLD AUTO: 213 K/UL — SIGNIFICANT CHANGE UP (ref 150–400)
POTASSIUM SERPL-MCNC: 3.6 MMOL/L — SIGNIFICANT CHANGE UP (ref 3.5–5.3)
POTASSIUM SERPL-SCNC: 3.6 MMOL/L — SIGNIFICANT CHANGE UP (ref 3.5–5.3)
PROT SERPL-MCNC: 5.6 G/DL — LOW (ref 6–8.3)
RBC # BLD: 4.54 M/UL — SIGNIFICANT CHANGE UP (ref 3.8–5.2)
RBC # FLD: 11.9 % — SIGNIFICANT CHANGE UP (ref 10.3–14.5)
SODIUM SERPL-SCNC: 138 MMOL/L — SIGNIFICANT CHANGE UP (ref 135–145)
TROPONIN I SERPL-MCNC: 0.02 NG/ML — SIGNIFICANT CHANGE UP (ref 0.01–0.04)
TROPONIN I SERPL-MCNC: <.015 NG/ML — SIGNIFICANT CHANGE UP (ref 0.01–0.04)
WBC # BLD: 9.07 K/UL — SIGNIFICANT CHANGE UP (ref 3.8–10.5)
WBC # FLD AUTO: 9.07 K/UL — SIGNIFICANT CHANGE UP (ref 3.8–10.5)

## 2018-11-26 PROCEDURE — 74018 RADEX ABDOMEN 1 VIEW: CPT | Mod: 26

## 2018-11-26 PROCEDURE — 93010 ELECTROCARDIOGRAM REPORT: CPT

## 2018-11-26 RX ORDER — SODIUM CHLORIDE 9 MG/ML
250 INJECTION INTRAMUSCULAR; INTRAVENOUS; SUBCUTANEOUS ONCE
Qty: 0 | Refills: 0 | Status: COMPLETED | OUTPATIENT
Start: 2018-11-26 | End: 2018-11-26

## 2018-11-26 RX ADMIN — Medication 5 MILLIGRAM(S): at 02:41

## 2018-11-26 RX ADMIN — SODIUM CHLORIDE 250 MILLILITER(S): 9 INJECTION INTRAMUSCULAR; INTRAVENOUS; SUBCUTANEOUS at 13:47

## 2018-11-26 RX ADMIN — ENOXAPARIN SODIUM 40 MILLIGRAM(S): 100 INJECTION SUBCUTANEOUS at 13:46

## 2018-11-26 RX ADMIN — DEXTROSE MONOHYDRATE, SODIUM CHLORIDE, AND POTASSIUM CHLORIDE 75 MILLILITER(S): 50; .745; 4.5 INJECTION, SOLUTION INTRAVENOUS at 17:52

## 2018-11-26 RX ADMIN — Medication 5 MILLIGRAM(S): at 13:45

## 2018-11-26 RX ADMIN — Medication 5 MILLIGRAM(S): at 17:52

## 2018-11-26 RX ADMIN — Medication 5 MILLIGRAM(S): at 07:40

## 2018-11-26 NOTE — CONSULT NOTE ADULT - SUBJECTIVE AND OBJECTIVE BOX
History of Present Illness: The patient is a 89 year old female with a history of HTN who was admitted with nausea, vomiting, abdominal pain, found to have SBO. Today, while on toilet having a bowel movement, she started to feel lightheaded and subsequently lost consciousness for about 30 sec. No chest pain, palpitations, shortness of breath. Her BP was noted to be around 100/60. She currently is back to baseline with no complaints.    Past Medical/Surgical History:  HTN    Medications:  MEDICATIONS  (STANDING):  enoxaparin Injectable 40 milliGRAM(s) SubCutaneous daily  metoprolol tartrate Injectable 5 milliGRAM(s) IV Push every 6 hours  sodium chloride 0.45% with potassium chloride 20 mEq/L 1000 milliLiter(s) (75 mL/Hr) IV Continuous <Continuous>    MEDICATIONS  (PRN):  haloperidol    Injectable 0.5 milliGRAM(s) IntraMuscular every 6 hours PRN Agitation  ondansetron Injectable 4 milliGRAM(s) IV Push every 6 hours PRN Nausea and/or Vomiting      Family History: Non-contributory family history of premature cardiovascular atherosclerotic disease    Social History: No tobacco, alcohol or drug use    Review of Systems:  General: No fevers, chills, weight loss or gain  Skin: No rashes, color changes  Cardiovascular: No chest pain, orthopnea  Respiratory: No shortness of breath, cough  Gastrointestinal: No nausea, abdominal pain  Genitourinary: No incontinence, pain with urination  Musculoskeletal: No pain, swelling, decreased range of motion  Neurological: No headache, weakness  Psychiatric: No depression, anxiety  Endocrine: No weight loss or gain, increased thirst  All other systems are comprehensively negative.    Physical Exam:  Vitals:        Vital Signs Last 24 Hrs  T(C): 36.7 (26 Nov 2018 13:40), Max: 36.8 (26 Nov 2018 04:29)  T(F): 98 (26 Nov 2018 13:40), Max: 98.3 (26 Nov 2018 04:29)  HR: 81 (26 Nov 2018 13:40) (72 - 84)  BP: 152/83 (26 Nov 2018 13:40) (123/72 - 178/76)  BP(mean): --  RR: 18 (26 Nov 2018 13:40) (16 - 18)  SpO2: 97% (26 Nov 2018 13:40) (92% - 97%)  General: NAD  HEENT: MMM  Neck: No JVD, no carotid bruit  Lungs: CTAB  CV: RRR, nl S1/S2, no M/R/G  Abdomen: S/NT/ND, +BS  Extremities: No LE edema, no cyanosis  Neuro: AAOx3, non-focal  Skin: No rash    Labs:                        14.3   9.07  )-----------( 213      ( 26 Nov 2018 09:09 )             41.3     11-26    138  |  101  |  14  ----------------------------<  73  3.6   |  26  |  0.32<L>    Ca    7.3<L>      26 Nov 2018 09:09  Phos  2.5     11-26  Mg     1.6     11-26    TPro  5.6<L>  /  Alb  2.2<L>  /  TBili  0.8  /  DBili  x   /  AST  19  /  ALT  17  /  AlkPhos  36<L>  11-26    CARDIAC MARKERS ( 26 Nov 2018 13:32 )  <.015 ng/mL / x     / x     / x     / x              ECG: NSR, LAD, nonspecific ST abnormality

## 2018-11-26 NOTE — PROGRESS NOTE ADULT - SUBJECTIVE AND OBJECTIVE BOX
Subjective: Pt with bowel movement last pm, without abdominal pain.    Objective:  Vital Signs Last 24 Hrs  T(C): 36.7 (26 Nov 2018 09:15), Max: 36.8 (26 Nov 2018 04:29)  T(F): 98 (26 Nov 2018 09:15), Max: 98.3 (26 Nov 2018 04:29)  HR: 83 (26 Nov 2018 09:15) (72 - 84)  BP: 136/77 (26 Nov 2018 09:15) (123/72 - 178/76)  BP(mean): --  RR: 16 (26 Nov 2018 09:15) (16 - 18)  SpO2: 92% (26 Nov 2018 09:15) (92% - 94%)    Heent: LEONEL, FREOM  Pul: clear  Cor: RSR, without murmurs  Abdomen: normal bowel sounds, without distension or tenderness  Extremities: without edema, motor/sensory intact, without calf pain, Homans sign negative.                        14.3   9.07  )-----------( 213      ( 26 Nov 2018 09:09 )             41.3       11-26    138  |  101  |  14  ----------------------------<  73  3.6   |  26  |  0.32<L>    Ca    7.3<L>      26 Nov 2018 09:09  Phos  2.5     11-26  Mg     1.6     11-26    TPro  5.6<L>  /  Alb  2.2<L>  /  TBili  0.8  /  DBili  x   /  AST  19  /  ALT  17  /  AlkPhos  36<L>  11-26 11-25 @ 07:01  -  11-26 @ 07:00  --------------------------------------------------------  IN:    sodium chloride 0.45% with potassium chloride 20 mEq/L: 900 mL  Total IN: 900 mL    OUT:    Nasoenteral Tube: 600 mL  Total OUT: 600 mL    Total NET: 300 mL    < from: Xray Abdomen 1 View PORTABLE -Routine (11.26.18 @ 08:18) >  EXAM:  XR ABDOMEN PORTABLE ROUTINE 1V                            PROCEDURE DATE:  11/26/2018          INTERPRETATION:  Abdomen one view    HISTORY: Small bowel obstruction    Comparison: 11/25/2013    Frontal view of the abdomen shows a normal gas pattern. Nasogastric tube   has been retracted and its sidehole lies in the distal esophagus. Right   upper quadrant clips are again noted. No definite free air is identified.    IMPRESSION:  No evidence of bowel obstruction. NG tube as described..    Thank you for this referral.    < end of copied text >

## 2018-11-26 NOTE — CHART NOTE - NSCHARTNOTEFT_GEN_A_CORE
Resident Rapid Response Note    Patient is a 89y old  Female who presents with a chief complaint of Abdominal pain, vomiting (26 Nov 2018 10:41)      Rapid response was called at on this 89y Female patient at 1100 by RN for dizziness and weakness while in the bathroom. ICU PA Nando Magana, primary team, and RRT were present at bedside. PT evaluated by ICU PA and covering resident, found to be lethargic but responsive and cooperative with exam. Pt now more alert, without complaints, resting comfortably. Neuro exam grossly WNL, physical exam unremarkable.    F/U Vital Signs:  BP: 152/83  HR: 81  RR: 18  SpO2: 97% on 2L NC  Temp: 98    Vital Signs Last 24 Hrs  T(C): 36.7 (26 Nov 2018 11:39), Max: 36.8 (26 Nov 2018 04:29)  T(F): 98 (26 Nov 2018 11:39), Max: 98.3 (26 Nov 2018 04:29)  HR: 74 (26 Nov 2018 11:39) (72 - 84)  BP: 131/73 (26 Nov 2018 11:39) (123/72 - 178/76)  BP(mean): --  RR: 17 (26 Nov 2018 11:39) (16 - 18)  SpO2: 97% (26 Nov 2018 11:39) (92% - 97%)    Physical Exam:  General: elderly female in NAD  HEENT: NCAT, PERRLA, EOMI bl, moist mucous membranes. NGT present.   Neck: Supple, nontender, no mass  Neurology: A&Ox1, nonfocal, CN II-XII grossly intact, sensation intact  Respiratory: CTA B/L, No W/R/R  CV: RRR, +S1/S2, no murmurs, rubs or gallops  Abdominal: Soft, NT, ND +BSx4  Extremities: No C/C/E, + peripheral pulses  MSK: Normal ROM, no joint erythema or warmth, no joint swelling   Skin: warm, dry, normal color, no obvious rash or abnormal lesions    LABS:                        14.3   9.07  )-----------( 213      ( 26 Nov 2018 09:09 )             41.3     26 Nov 2018 09:09    138    |  101    |  14     ----------------------------<  73     3.6     |  26     |  0.32     Ca    7.3        26 Nov 2018 09:09  Phos  2.5       26 Nov 2018 09:09  Mg     1.6       26 Nov 2018 09:09    TPro  5.6    /  Alb  2.2    /  TBili  0.8    /  DBili  x      /  AST  19     /  ALT  17     /  AlkPhos  36     26 Nov 2018 09:09        CAPILLARY BLOOD GLUCOSE      POCT Blood Glucose.: 79 mg/dL (26 Nov 2018 11:00)        RADIOLOGY & ADDITIONAL TESTS:      Assessment/Plan: Pt is a 89 y/o F with PMHx HTN, visual/auditory hallucinations, s/p cholecystectomy, s/p WILL, L inguinal hernia admitted to [facility] for conservative vs surgical management of SBO, with RRT called for near-syncopal episode.     - near syncopal event after BM on toilet, likely vasovagal event s/p gastroview challenge  - pt now more alert than previous, states she does not recall being dizzy, but states she was shocked upon hearing about it afterwards.  - given 250cc NS bolus over 1 hour as per surgery  - EKG showing sinus tachycardia, initially noted to be unchanged from previous. Upon further review, there are signs of possible ischemia. Troponins sent as per Dr. Perlman and Cardio (Dr. Mckeon) consulted.   - f/u cardiac enzymes, cardio recs.  - senior resident discussed case with Dr. Perlman and Dr. Fernandez. Dr. Fernandez to see pt soon.  - plan d/w pt's daughter present at bedside. Pt currently DNR/DNI, refusing surgery. Goals of care to be rediscussed w/ pt and family.  - RN to follow, will call with any changes  -Will continue to follow, RN to call if any changes.    Dr. Terry  PGY-1  [phone] Resident Rapid Response Note    Patient is a 89y old  Female who presents with a chief complaint of Abdominal pain, vomiting (26 Nov 2018 10:41)      Rapid response was called at on this 89y Female patient at 1100 by RN for dizziness and weakness while in the bathroom. ICU PA Nando Magana, primary team, and RRT were present at bedside. PT evaluated by ICU PA and covering resident, found to be lethargic but responsive and cooperative with exam. Pt now more alert, without complaints, resting comfortably. Neuro exam grossly WNL, physical exam unremarkable.    F/U Vital Signs:  BP: 152/83  HR: 81  RR: 18  SpO2: 97% on 2L NC  Temp: 98    Vital Signs Last 24 Hrs  T(C): 36.7 (26 Nov 2018 11:39), Max: 36.8 (26 Nov 2018 04:29)  T(F): 98 (26 Nov 2018 11:39), Max: 98.3 (26 Nov 2018 04:29)  HR: 74 (26 Nov 2018 11:39) (72 - 84)  BP: 131/73 (26 Nov 2018 11:39) (123/72 - 178/76)  BP(mean): --  RR: 17 (26 Nov 2018 11:39) (16 - 18)  SpO2: 97% (26 Nov 2018 11:39) (92% - 97%)    Physical Exam:  General: elderly female in NAD  HEENT: NCAT, PERRLA, EOMI bl, moist mucous membranes. NGT present.   Neck: Supple, nontender, no mass  Neurology: A&Ox1, nonfocal, CN II-XII grossly intact, sensation intact  Respiratory: CTA B/L, No W/R/R  CV: RRR, +S1/S2, no murmurs, rubs or gallops  Abdominal: Soft, NT, ND +BSx4  Extremities: No C/C/E, + peripheral pulses  MSK: Normal ROM, no joint erythema or warmth, no joint swelling   Skin: warm, dry, normal color, no obvious rash or abnormal lesions    LABS:                        14.3   9.07  )-----------( 213      ( 26 Nov 2018 09:09 )             41.3     26 Nov 2018 09:09    138    |  101    |  14     ----------------------------<  73     3.6     |  26     |  0.32     Ca    7.3        26 Nov 2018 09:09  Phos  2.5       26 Nov 2018 09:09  Mg     1.6       26 Nov 2018 09:09    TPro  5.6    /  Alb  2.2    /  TBili  0.8    /  DBili  x      /  AST  19     /  ALT  17     /  AlkPhos  36     26 Nov 2018 09:09        CAPILLARY BLOOD GLUCOSE      POCT Blood Glucose.: 79 mg/dL (26 Nov 2018 11:00)        RADIOLOGY & ADDITIONAL TESTS:      Assessment/Plan: Pt is a 89 y/o F with PMHx HTN, visual/auditory hallucinations, s/p cholecystectomy, s/p WILL, L inguinal hernia admitted to [facility] for conservative vs surgical management of SBO, with RRT called for near-syncopal episode.     - near syncopal event after BM on toilet, likely vasovagal event s/p gastroview challenge  - pt now more alert than previous, states she does not recall being dizzy, but states she was shocked upon hearing about it afterwards.  - given 250cc NS bolus over 1 hour as per surgery  - EKG showing sinus tachycardia, initially noted to be unremarkable by ICU and RRT. Upon further review, there are signs of possible ischemia. Troponins sent as per Dr. Perlman and Cardio (Dr. Mckeon) consulted.   - seen by cardio, no ischemia evident. recommends checking orthostatics. 1st set trops neg.  - f/u cardiac enzymes, cardio recs.  - senior resident discussed case with Dr. Perlman and Dr. Fernandez. Dr. Fernandez to see pt soon.  - plan d/w pt's daughter present at bedside. Pt currently DNR/DNI, refusing surgery. Goals of care to be rediscussed w/ pt and family.  - RN to follow, will call with any changes  -Will continue to follow, RN to call if any changes.    Dr. Terry  PGY-1  [phone]

## 2018-11-26 NOTE — CONSULT NOTE ADULT - ASSESSMENT
The patient is a 89 year old female with a history of HTN who was admitted with SBO.    Plan:  - Episode of syncope likely vasovagal in origin given description and BP on low side during episode. There may be a component of orthostatic hypotension as well.  - ECG with no evidence of ischemia or infarction  - No events besides sinus tachycardia noted on telemetry  - Receiving IV fluids  - Check orthostatics  - On metoprolol 5 mg IV q6h  - Surgery follow-up

## 2018-11-26 NOTE — PROGRESS NOTE ADULT - PROBLEM SELECTOR PLAN 1
events as noted  - KUB 11/25 showing nonspecific bowel-gas pattern, large amount of stool in colon. No dilated air-filled loops.   - f/u rpt KUB  - surgery following (Dr. Mauricio), possible surgical intervention if no improvement.  - c/w NGT, NPO, IVF. events as noted  - KUB 11/25 showing nonspecific bowel-gas pattern, large amount of stool in colon. No dilated air-filled loops.   - KUB 11/26 showing no definite bowel obstruction  - surgery following (Dr. Mauricio), possible surgical intervention if no improvement.  - c/w NGT, NPO, IVF.

## 2018-11-26 NOTE — PROGRESS NOTE ADULT - SUBJECTIVE AND OBJECTIVE BOX
Patient is a 89y old  Female who presents with a chief complaint of Abdominal pain, vomiting (25 Nov 2018 16:18)      INTERVAL HPI/OVERNIGHT EVENTS: Pt seen and examined at bedside with daughter present in room. Pt admits to one BM yesterday afternoon, and is urinating well. Slept well with no issues overnight. Denies abd pain, fever chills.    T(C): 36.6 (11-26-18 @ 07:34), Max: 36.8 (11-26-18 @ 04:29)  HR: 83 (11-26-18 @ 07:34) (72 - 84)  BP: 178/76 (11-26-18 @ 07:34) (123/72 - 178/76)  RR: 18 (11-26-18 @ 07:34) (16 - 18)  SpO2: 92% (11-26-18 @ 07:34) (92% - 94%)  Wt(kg): --  I&O's Summary    25 Nov 2018 07:01  -  26 Nov 2018 07:00  --------------------------------------------------------  IN: 900 mL / OUT: 600 mL / NET: 300 mL        LABS:                        14.2   8.21  )-----------( 205      ( 24 Nov 2018 08:39 )             41.0     11-24    142  |  103  |  17  ----------------------------<  81  3.4<L>   |  29  |  0.38<L>    Ca    7.6<L>      24 Nov 2018 08:39  Phos  2.7     11-24  Mg     1.8     11-24    TPro  5.8<L>  /  Alb  2.3<L>  /  TBili  1.5<H>  /  DBili  x   /  AST  16  /  ALT  16  /  AlkPhos  37<L>  11-24        CAPILLARY BLOOD GLUCOSE                MEDICATIONS  (STANDING):  enoxaparin Injectable 40 milliGRAM(s) SubCutaneous daily  metoprolol tartrate Injectable 5 milliGRAM(s) IV Push every 6 hours  sodium chloride 0.45% with potassium chloride 20 mEq/L 1000 milliLiter(s) (75 mL/Hr) IV Continuous <Continuous>    MEDICATIONS  (PRN):  haloperidol    Injectable 0.5 milliGRAM(s) IntraMuscular every 6 hours PRN Agitation  ondansetron Injectable 4 milliGRAM(s) IV Push every 6 hours PRN Nausea and/or Vomiting      REVIEW OF SYSTEMS:  CONSTITUTIONAL: No fever, weight loss, or fatigue  EYES: No eye pain, visual disturbances, or discharge  ENMT:  No difficulty hearing, tinnitus, vertigo; No sinus or throat pain  NECK: No pain or stiffness  RESPIRATORY: No cough, wheezing, chills or hemoptysis; No shortness of breath  CARDIOVASCULAR: No chest pain, palpitations, dizziness, or leg swelling  GASTROINTESTINAL: No abdominal or epigastric pain. No nausea, vomiting, or hematemesis; No diarrhea or constipation. No melena or hematochezia.  GENITOURINARY: No dysuria, frequency, hematuria, or incontinence  NEUROLOGICAL: No headaches, memory loss, loss of strength, numbness, or tremors  SKIN: No itching, burning, rashes, or lesions   MUSCULOSKELETAL: No joint pain or swelling; No muscle, back, or extremity pain  PSYCHIATRIC: No depression, anxiety, mood swings, or difficulty sleeping    RADIOLOGY & ADDITIONAL TESTS:    Imaging Personally Reviewed:  [x ] YES  [ ] NO    Consultant(s) Notes Reviewed:  [x ] YES  [ ] NO    PHYSICAL EXAM:  GENERAL: elderly female NAD  HEAD:  Atraumatic, Normocephalic  EYES: EOMI, PERRLA, conjunctiva and sclera clear  ENMT: No tonsillar erythema, exudates, or enlargement; Moist mucous membranes, NGT present draining bilious fluid  NECK: Supple, No JVD, Normal thyroid  NERVOUS SYSTEM:  Alert & Oriented X1, Good concentration; Motor Strength 5/5 B/L upper and lower extremities; DTRs 2+ intact and symmetric  CHEST/LUNG: Clear to auscultation bilaterally; No rales, rhonchi, wheezing, or rubs  HEART: Regular rate and rhythm; No murmurs, rubs, or gallops  ABDOMEN: Soft, Nontender, Nondistended; Bowel sounds present x4  EXTREMITIES:  2+ Peripheral Pulses, No clubbing, cyanosis, or edema  LYMPH: No lymphadenopathy noted  SKIN: No rashes or lesions    Care Discussed with Consultants/Other Providers [x ] YES  [ ] NO

## 2018-11-26 NOTE — CHART NOTE - NSCHARTNOTEFT_GEN_A_CORE
rapid response   PGY-1    Rapid response called at 11AM called by RN for dizziness and weakness while in the bathroom. Patient was sitting on the toilet and had a watery BM and urinated. Afterwards, patient stood up and felt dizzy and asked to sit back down.     Vitals:  /62  pulse: 108  O2 on RA: 98%  temp: 98.0  fingerstick: 79    EKG findings: no significant changes from prior EKG on Nov 17th.  Sinus tach at 104.     PE:        Repeat vitals at 11:20AM:  BP: 126/78  HR: 90  O2: 95% on 2L  temp: 98.0      Dr. Perlman notified and Dr. Fernandez notified and recommends t250 NS bolus over 1 hour.     250 NS bolus ordered.    A/P:  89 yo female presenting with near syncope episode. At 11:16 AM tachycardia resolved.    250 NS bolus ordered. Rapid Response Note   PGY-1    Rapid response called at 1100 by RN for dizziness and weakness while in the bathroom. ICU PA Nando Magana, primary team, and RRT present at bedside. PT evaluated by ICU PA and covering resident. As per nursing, patient was sitting on the toilet and had a watery BM. Pt was being brought back to bed, and requested staff to bring her back to toilet at which point she felt dizzy and became lethargic. Pt was evaluated at bedside, found to be lethargic but responsive and cooperative with exam. Neuro exam unremarkable, pts vitals stable.     Vitals:  /62  HR: 108  O2 on RA: 98%  temp: 98.0  fingerstick: 79    EKG findings: no significant changes from prior EKG on Nov 17th. Sinus tach at 104.     Physical Exam:  General: lethargic elderly female  HEENT: NC/AT, PERRLA, EOMI B/L, moist mucous membranes   Neck: Supple, nontender, no masses  CV: RRR, +S1/S2, no murmurs, rubs or gallops  Respiratory: CTA B/L, No W/R/R  Abdominal: Soft, NT, ND +BSx4  Extremities: No C/C/E, + peripheral pulses  Neurology: AAOx1, nonfocal, CN II-XII grossly intact, sensation intact    Repeat vitals at 11:20AM:  BP: 126/78  HR: 90  O2: 95% on 2L  temp: 98.0      A/P: Pt is a 89 y/o F with PMHx HTN, visual/auditory hallucinations, s/p cholecystectomy, s/p WILL, L inguinal hernia admitted to [facility] for conservative vs surgical management of SBO, with RRT called for near-syncopal episode.     - near syncopal event after BM on toilet, likely vasovagal event s/p gastroview challenge  - Pt initially tachycardic at . Tachycardia resolved at 1116.   - mildly hypotensive at 100/62, resolved at 126/78, will give 250cc NS bolus over 1 hour as per surgery  - EKG showing sinus tachycardia, unchanged from previous. Neuro exam grossly WNL.  - senior resident discussed case with Dr. Perlman and Dr. Fernandez. Dr. Fernandez to see pt soon.  - plan d/w pt's daughter present at bedside. Pt currently DNR/DNI, refusing surgery. Goals of care to be rediscussed w/ pt and family.  - plan d/w senior resident  - rapid response f/u in 2 hours to reassess  - RN to follow, will call with any changes        Dr. Terry  PGY-1 [phone]

## 2018-11-27 DIAGNOSIS — R55 SYNCOPE AND COLLAPSE: ICD-10-CM

## 2018-11-27 LAB
ALBUMIN SERPL ELPH-MCNC: 2.2 G/DL — LOW (ref 3.3–5)
ALP SERPL-CCNC: 35 U/L — LOW (ref 40–120)
ALT FLD-CCNC: 17 U/L — SIGNIFICANT CHANGE UP (ref 12–78)
ANION GAP SERPL CALC-SCNC: 10 MMOL/L — SIGNIFICANT CHANGE UP (ref 5–17)
AST SERPL-CCNC: 17 U/L — SIGNIFICANT CHANGE UP (ref 15–37)
BASOPHILS # BLD AUTO: 0.02 K/UL — SIGNIFICANT CHANGE UP (ref 0–0.2)
BASOPHILS NFR BLD AUTO: 0.3 % — SIGNIFICANT CHANGE UP (ref 0–2)
BILIRUB SERPL-MCNC: 0.5 MG/DL — SIGNIFICANT CHANGE UP (ref 0.2–1.2)
BLD GP AB SCN SERPL QL: SIGNIFICANT CHANGE UP
BUN SERPL-MCNC: 12 MG/DL — SIGNIFICANT CHANGE UP (ref 7–23)
CALCIUM SERPL-MCNC: 7.7 MG/DL — LOW (ref 8.5–10.1)
CHLORIDE SERPL-SCNC: 99 MMOL/L — SIGNIFICANT CHANGE UP (ref 96–108)
CO2 SERPL-SCNC: 29 MMOL/L — SIGNIFICANT CHANGE UP (ref 22–31)
CREAT SERPL-MCNC: 0.41 MG/DL — LOW (ref 0.5–1.3)
EOSINOPHIL # BLD AUTO: 0 K/UL — SIGNIFICANT CHANGE UP (ref 0–0.5)
EOSINOPHIL NFR BLD AUTO: 0 % — SIGNIFICANT CHANGE UP (ref 0–6)
GLUCOSE SERPL-MCNC: 66 MG/DL — LOW (ref 70–99)
HCT VFR BLD CALC: 40.2 % — SIGNIFICANT CHANGE UP (ref 34.5–45)
HGB BLD-MCNC: 13.8 G/DL — SIGNIFICANT CHANGE UP (ref 11.5–15.5)
IMM GRANULOCYTES NFR BLD AUTO: 1.1 % — SIGNIFICANT CHANGE UP (ref 0–1.5)
LYMPHOCYTES # BLD AUTO: 0.89 K/UL — LOW (ref 1–3.3)
LYMPHOCYTES # BLD AUTO: 12 % — LOW (ref 13–44)
MAGNESIUM SERPL-MCNC: 1.6 MG/DL — SIGNIFICANT CHANGE UP (ref 1.6–2.6)
MCHC RBC-ENTMCNC: 31.3 PG — SIGNIFICANT CHANGE UP (ref 27–34)
MCHC RBC-ENTMCNC: 34.3 GM/DL — SIGNIFICANT CHANGE UP (ref 32–36)
MCV RBC AUTO: 91.2 FL — SIGNIFICANT CHANGE UP (ref 80–100)
MONOCYTES # BLD AUTO: 0.42 K/UL — SIGNIFICANT CHANGE UP (ref 0–0.9)
MONOCYTES NFR BLD AUTO: 5.7 % — SIGNIFICANT CHANGE UP (ref 2–14)
NEUTROPHILS # BLD AUTO: 5.98 K/UL — SIGNIFICANT CHANGE UP (ref 1.8–7.4)
NEUTROPHILS NFR BLD AUTO: 80.9 % — HIGH (ref 43–77)
PHOSPHATE SERPL-MCNC: 2.7 MG/DL — SIGNIFICANT CHANGE UP (ref 2.5–4.5)
PLATELET # BLD AUTO: 210 K/UL — SIGNIFICANT CHANGE UP (ref 150–400)
POTASSIUM SERPL-MCNC: 4 MMOL/L — SIGNIFICANT CHANGE UP (ref 3.5–5.3)
POTASSIUM SERPL-SCNC: 4 MMOL/L — SIGNIFICANT CHANGE UP (ref 3.5–5.3)
PROT SERPL-MCNC: 5.6 G/DL — LOW (ref 6–8.3)
RBC # BLD: 4.41 M/UL — SIGNIFICANT CHANGE UP (ref 3.8–5.2)
RBC # FLD: 12 % — SIGNIFICANT CHANGE UP (ref 10.3–14.5)
SODIUM SERPL-SCNC: 138 MMOL/L — SIGNIFICANT CHANGE UP (ref 135–145)
WBC # BLD: 7.39 K/UL — SIGNIFICANT CHANGE UP (ref 3.8–10.5)
WBC # FLD AUTO: 7.39 K/UL — SIGNIFICANT CHANGE UP (ref 3.8–10.5)

## 2018-11-27 PROCEDURE — 74250 X-RAY XM SM INT 1CNTRST STD: CPT | Mod: 26

## 2018-11-27 RX ORDER — DOCUSATE SODIUM 100 MG
100 CAPSULE ORAL THREE TIMES A DAY
Qty: 0 | Refills: 0 | Status: DISCONTINUED | OUTPATIENT
Start: 2018-11-27 | End: 2018-11-30

## 2018-11-27 RX ADMIN — Medication 5 MILLIGRAM(S): at 05:33

## 2018-11-27 RX ADMIN — ENOXAPARIN SODIUM 40 MILLIGRAM(S): 100 INJECTION SUBCUTANEOUS at 11:04

## 2018-11-27 RX ADMIN — Medication 5 MILLIGRAM(S): at 00:36

## 2018-11-27 RX ADMIN — DEXTROSE MONOHYDRATE, SODIUM CHLORIDE, AND POTASSIUM CHLORIDE 75 MILLILITER(S): 50; .745; 4.5 INJECTION, SOLUTION INTRAVENOUS at 00:37

## 2018-11-27 RX ADMIN — Medication 100 MILLIGRAM(S): at 11:18

## 2018-11-27 NOTE — PROGRESS NOTE ADULT - PROBLEM SELECTOR PLAN 1
Events as noted  - KUB 11/25 showing nonspecific bowel-gas pattern, large amount of stool in colon. No dilated air-filled loops.   - KUB 11/26 showing no definite bowel obstruction.  - Surgery following (Dr. Mauricio), possible surgical intervention if no improvement.  - c/w NGT, NPO, IVF. Events as noted  - KUB 11/25 showing nonspecific bowel-gas pattern, large amount of stool in colon. No dilated air-filled loops.   - KUB 11/26 showing no definite bowel obstruction.  - Surgery following (Dr. Mauricio), removed NGT this morning.  - Continue IVF. Resolving SBO  - KUB 11/25 showing nonspecific bowel-gas pattern, large amount of stool in colon. No dilated air-filled loops.   - KUB 11/26 showing no definite bowel obstruction.  - Xray small bowel with gastro 11/27 showing no evidence of small bowel obstruction.  - Surgery following (Dr. Mauricio), removed NGT this morning.  - Per surgery, advance diet and decrease IVF.

## 2018-11-27 NOTE — PROGRESS NOTE ADULT - SUBJECTIVE AND OBJECTIVE BOX
Patient is a 89y old  Female who presents with a chief complaint of Abdominal pain, vomiting (26 Nov 2018 10:41)    CHARTING IN PROGRESS    INTERVAL HPI/OVERNIGHT EVENTS:    T(C): 36.7 (11-27-18 @ 05:19), Max: 36.7 (11-26-18 @ 09:15)  HR: 81 (11-27-18 @ 05:19) (71 - 88)  BP: 154/89 (11-27-18 @ 05:19) (96/67 - 154/89)  RR: 18 (11-27-18 @ 05:19) (16 - 18)  SpO2: 98% (11-27-18 @ 05:19) (92% - 98%)  Wt(kg): --    I&O's Summary    26 Nov 2018 07:01  -  27 Nov 2018 07:00  --------------------------------------------------------  IN: 675 mL / OUT: 400 mL / NET: 275 mL        LABS:                        14.3   9.07  )-----------( 213      ( 26 Nov 2018 09:09 )             41.3     11-26    138  |  101  |  14  ----------------------------<  73  3.6   |  26  |  0.32<L>    Ca    7.3<L>      26 Nov 2018 09:09  Phos  2.5     11-26  Mg     1.6     11-26    TPro  5.6<L>  /  Alb  2.2<L>  /  TBili  0.8  /  DBili  x   /  AST  19  /  ALT  17  /  AlkPhos  36<L>  11-26      CAPILLARY BLOOD GLUCOSE  POCT Blood Glucose.: 79 mg/dL (26 Nov 2018 11:00)      MEDICATIONS  (STANDING):  enoxaparin Injectable 40 milliGRAM(s) SubCutaneous daily  sodium chloride 0.45% with potassium chloride 20 mEq/L 1000 milliLiter(s) (75 mL/Hr) IV Continuous <Continuous>    MEDICATIONS  (PRN):  haloperidol    Injectable 0.5 milliGRAM(s) IntraMuscular every 6 hours PRN Agitation  ondansetron Injectable 4 milliGRAM(s) IV Push every 6 hours PRN Nausea and/or Vomiting      REVIEW OF SYSTEMS:  CONSTITUTIONAL: No fever, weight loss, or fatigue  EYES: No eye pain, visual disturbances, or discharge  ENMT:  No difficulty hearing, tinnitus, vertigo; No sinus or throat pain  NECK: No pain or stiffness  RESPIRATORY: No cough, wheezing, chills or hemoptysis; No shortness of breath  CARDIOVASCULAR: No chest pain, palpitations, dizziness, or leg swelling  GASTROINTESTINAL: No abdominal or epigastric pain. No nausea, vomiting, or hematemesis; No diarrhea or constipation. No melena or hematochezia.  GENITOURINARY: No dysuria, frequency, hematuria, or incontinence  NEUROLOGICAL: No headaches, memory loss, loss of strength, numbness, or tremors  SKIN: No itching, burning, rashes, or lesions   MUSCULOSKELETAL: No joint pain or swelling; No muscle, back, or extremity pain  PSYCHIATRIC: No depression, anxiety, mood swings, or difficulty sleeping    RADIOLOGY & ADDITIONAL TESTS:    Imaging Personally Reviewed:  [ X ] YES  [ ] NO    Consultant(s) Notes Reviewed:  [ X ] YES  [ ] NO    PHYSICAL EXAM:  GENERAL: elderly female NAD  HEAD:  Atraumatic, Normocephalic  EYES: EOMI, PERRLA, conjunctiva and sclera clear  ENMT: No tonsillar erythema, exudates, or enlargement; Moist mucous membranes, NGT present draining bilious fluid  NECK: Supple, No JVD, Normal thyroid  NERVOUS SYSTEM:  Alert & Oriented X1, Good concentration; Motor Strength 5/5 B/L upper and lower extremities; DTRs 2+ intact and symmetric  CHEST/LUNG: Clear to auscultation bilaterally; No rales, rhonchi, wheezing, or rubs  HEART: Regular rate and rhythm; No murmurs, rubs, or gallops  ABDOMEN: Soft, Nontender, Nondistended; Bowel sounds present x4  EXTREMITIES:  2+ Peripheral Pulses, No clubbing, cyanosis, or edema  LYMPH: No lymphadenopathy noted  SKIN: No rashes or lesions    Care Discussed with Consultants/Other Providers [ X ] YES  [ ] NO Patient is a 89y old  Female who presents with a chief complaint of Abdominal pain, vomiting (26 Nov 2018 10:41)    INTERVAL HPI/OVERNIGHT EVENTS: Patient seen and examined at bedside this morning, reported that she feels well and has no complaints. Surgery removed NGT this morning.  Per tele monitor, patient remains in sinus rhythm with no acute events overnight, continues to have small ST elevations approx 1.4 mm - less than yesterday.    T(C): 36.7 (11-27-18 @ 05:19), Max: 36.7 (11-26-18 @ 09:15)  HR: 81 (11-27-18 @ 05:19) (71 - 88)  BP: 154/89 (11-27-18 @ 05:19) (96/67 - 154/89)  RR: 18 (11-27-18 @ 05:19) (16 - 18)  SpO2: 98% (11-27-18 @ 05:19) (92% - 98%)  Wt(kg): --    I&O's Summary    26 Nov 2018 07:01  -  27 Nov 2018 07:00  --------------------------------------------------------  IN: 675 mL / OUT: 400 mL / NET: 275 mL        LABS:                        14.3   9.07  )-----------( 213      ( 26 Nov 2018 09:09 )             41.3     11-26    138  |  101  |  14  ----------------------------<  73  3.6   |  26  |  0.32<L>    Ca    7.3<L>      26 Nov 2018 09:09  Phos  2.5     11-26  Mg     1.6     11-26    TPro  5.6<L>  /  Alb  2.2<L>  /  TBili  0.8  /  DBili  x   /  AST  19  /  ALT  17  /  AlkPhos  36<L>  11-26      CAPILLARY BLOOD GLUCOSE  POCT Blood Glucose.: 79 mg/dL (26 Nov 2018 11:00)      MEDICATIONS  (STANDING):  enoxaparin Injectable 40 milliGRAM(s) SubCutaneous daily  sodium chloride 0.45% with potassium chloride 20 mEq/L 1000 milliLiter(s) (75 mL/Hr) IV Continuous <Continuous>    MEDICATIONS  (PRN):  haloperidol    Injectable 0.5 milliGRAM(s) IntraMuscular every 6 hours PRN Agitation  ondansetron Injectable 4 milliGRAM(s) IV Push every 6 hours PRN Nausea and/or Vomiting      REVIEW OF SYSTEMS:  CONSTITUTIONAL: No fever, weight loss, or fatigue  EYES: No eye pain, visual disturbances, or discharge  ENMT:  No difficulty hearing, tinnitus, vertigo; No sinus or throat pain  NECK: No pain or stiffness  RESPIRATORY: No cough, wheezing, chills or hemoptysis; No shortness of breath  CARDIOVASCULAR: No chest pain, palpitations, dizziness, or leg swelling  GASTROINTESTINAL: No abdominal or epigastric pain. No nausea, vomiting, or hematemesis; No diarrhea or constipation. No melena or hematochezia.  GENITOURINARY: No dysuria, frequency, hematuria, or incontinence  NEUROLOGICAL: No headaches, memory loss, loss of strength, numbness, or tremors  SKIN: No itching, burning, rashes, or lesions   MUSCULOSKELETAL: No joint pain or swelling; No muscle, back, or extremity pain  PSYCHIATRIC: No depression, anxiety, mood swings, or difficulty sleeping    RADIOLOGY & ADDITIONAL TESTS:    Imaging Personally Reviewed:  [ X ] YES  [ ] NO    Consultant(s) Notes Reviewed:  [ X ] YES  [ ] NO    PHYSICAL EXAM:  GENERAL: elderly female NAD, nasal cannula in place  HEAD:  Atraumatic, Normocephalic  EYES: EOMI, PERRLA, conjunctiva and sclera clear  ENMT: No tonsillar erythema, exudates, or enlargement; Moist mucous membranes  NECK: Supple, No JVD, Normal thyroid  NERVOUS SYSTEM:  Alert & Oriented X1, Good concentration  CHEST/LUNG: Clear to auscultation bilaterally; No rales, rhonchi, wheezing, or rubs  HEART: Regular rate and rhythm; No murmurs, rubs, or gallops  ABDOMEN: Soft, Nontender, Nondistended; Bowel sounds present x4  EXTREMITIES:  2+ Peripheral Pulses, No clubbing, cyanosis, or edema  LYMPH: No lymphadenopathy noted  SKIN: Warm, well-perfused    Care Discussed with Consultants/Other Providers [ X ] YES  [ ] NO Patient is a 89y old  Female who presents with a chief complaint of Abdominal pain, vomiting (26 Nov 2018 10:41)    INTERVAL HPI/OVERNIGHT EVENTS: Patient seen and examined at bedside this morning, reported that she feels well and has no complaints. Surgery removed NGT this morning.  Per tele monitor, patient remains in sinus rhythm with no acute events overnight, continues to have small ST elevations approx 1.4 mm - less than yesterday.    T(C): 36.7 (11-27-18 @ 05:19), Max: 36.7 (11-26-18 @ 09:15)  HR: 81 (11-27-18 @ 05:19) (71 - 88)  BP: 154/89 (11-27-18 @ 05:19) (96/67 - 154/89)  RR: 18 (11-27-18 @ 05:19) (16 - 18)  SpO2: 98% (11-27-18 @ 05:19) (92% - 98%)  Wt(kg): --    I&O's Summary    26 Nov 2018 07:01  -  27 Nov 2018 07:00  --------------------------------------------------------  IN: 675 mL / OUT: 400 mL / NET: 275 mL        LABS:                        14.3   9.07  )-----------( 213      ( 26 Nov 2018 09:09 )             41.3     11-26    138  |  101  |  14  ----------------------------<  73  3.6   |  26  |  0.32<L>    Ca    7.3<L>      26 Nov 2018 09:09  Phos  2.5     11-26  Mg     1.6     11-26    TPro  5.6<L>  /  Alb  2.2<L>  /  TBili  0.8  /  DBili  x   /  AST  19  /  ALT  17  /  AlkPhos  36<L>  11-26    CARDIAC MARKERS ( 26 Nov 2018 18:58 )  .023 ng/mL / x     / x     / x     / x      CARDIAC MARKERS ( 26 Nov 2018 13:32 )  <.015 ng/mL / x     / x     / x     / x          CAPILLARY BLOOD GLUCOSE  POCT Blood Glucose.: 79 mg/dL (26 Nov 2018 11:00)      MEDICATIONS  (STANDING):  enoxaparin Injectable 40 milliGRAM(s) SubCutaneous daily  sodium chloride 0.45% with potassium chloride 20 mEq/L 1000 milliLiter(s) (75 mL/Hr) IV Continuous <Continuous>    MEDICATIONS  (PRN):  haloperidol    Injectable 0.5 milliGRAM(s) IntraMuscular every 6 hours PRN Agitation  ondansetron Injectable 4 milliGRAM(s) IV Push every 6 hours PRN Nausea and/or Vomiting      REVIEW OF SYSTEMS:  CONSTITUTIONAL: No fever, weight loss, or fatigue  EYES: No eye pain, visual disturbances, or discharge  ENMT:  No difficulty hearing, tinnitus, vertigo; No sinus or throat pain  NECK: No pain or stiffness  RESPIRATORY: No cough, wheezing, chills or hemoptysis; No shortness of breath  CARDIOVASCULAR: No chest pain, palpitations, dizziness, or leg swelling  GASTROINTESTINAL: No abdominal or epigastric pain. No nausea, vomiting, or hematemesis; No diarrhea or constipation. No melena or hematochezia.  GENITOURINARY: No dysuria, frequency, hematuria, or incontinence  NEUROLOGICAL: No headaches, memory loss, loss of strength, numbness, or tremors  SKIN: No itching, burning, rashes, or lesions   MUSCULOSKELETAL: No joint pain or swelling; No muscle, back, or extremity pain  PSYCHIATRIC: No depression, anxiety, mood swings, or difficulty sleeping    RADIOLOGY & ADDITIONAL TESTS:  < from: Xray Small Bowel w/Gastro (11.27.18 @ 10:10) >  80 cc Gastroview was ingested by the patient. Serial overhead abdominal radiographs obtained every 6 hours up to 24 hours.  On the initial 6 hour delayed radiograph, all of the ingested contrast is within the colon and rectum. There is no evidence of small bowel obstruction.  Impression: No evidence of small bowel obstruction  < end of copied text >    Imaging Personally Reviewed:  [ X ] YES  [ ] NO    Consultant(s) Notes Reviewed:  [ X ] YES  [ ] NO    PHYSICAL EXAM:  GENERAL: elderly female NAD, nasal cannula in place  HEAD: Atraumatic, Normocephalic  EYES: EOMI, PERRLA, conjunctiva and sclera clear  ENMT: No tonsillar erythema, exudates, or enlargement; Moist mucous membranes  NECK: Supple, No JVD, Normal thyroid  NERVOUS SYSTEM:  Alert & Oriented X1, Good concentration  CHEST/LUNG: Clear to auscultation bilaterally; No rales, rhonchi, wheezing, or rubs  HEART: Regular rate and rhythm; No murmurs, rubs, or gallops  ABDOMEN: Soft, Nontender, Nondistended; Bowel sounds present x4  EXTREMITIES:  + Peripheral Pulses, No clubbing, cyanosis, or edema  LYMPH: No lymphadenopathy noted  SKIN: Warm, well-perfused    Care Discussed with Consultants/Other Providers [ X ] YES  [ ] NO

## 2018-11-27 NOTE — PROGRESS NOTE ADULT - PROBLEM SELECTOR PLAN 4
Chronic, stable  - Continue IV Lopressor 5mg q6h in lieu of cardizem. Continue w/ hold parameters. Pt with hx of sundowning, agitation likely 2/2 worsening dementia  - Refused interventions, imaging, etc at nighttime, however is amenable to most interventions during day.   - c/w Haldol 0.5mg IM q6h PRN for agitation.

## 2018-11-27 NOTE — PROGRESS NOTE ADULT - PROBLEM SELECTOR PLAN 2
Resolved, remains afebrile  - Pt without evidence for acute abdomen, denies pain. Abd NT, ND.   - Continue to monitor, f/u AM CBC. Likely 2/2 vasovagal and orthostatic hypotension  - Cardio (Dr. Mckeon) consulted after rapid response yesterday.  - Orthostatics positive yesterday (123 to 96 systolic), will f/u repeat today.  - Per cardio, discontinued metoprolol and can resume when orthostatics negative.  - Continue IVF. Likely 2/2 vasovagal and orthostatic hypotension  - Cardio (Dr. Mckeon) consulted after rapid response for near-syncopal event yesterday.  - Orthostatics positive yesterday (123 to 96 systolic), will f/u repeat today.  - Per cardio, discontinued metoprolol and can resume when orthostatics negative.  - On IVF, decreasing per surgery. Likely 2/2 vasovagal and orthostatic hypotension  - Cardio (Dr. Mckeon) consulted after rapid response for near-syncopal event yesterday.  - Orthostatics positive yesterday (123 to 96 systolic) and today (123 to 92 systolic).  - Per cardio, discontinued metoprolol and can resume when orthostatics negative.  - Patient is tolerating PO intake, advancing diet per surgery.

## 2018-11-27 NOTE — PROGRESS NOTE ADULT - ASSESSMENT
89 yo F with PMH of HTN, visual/auditory hallucinations, s/p cholecystectomy, s/p WILL, L inguinal hernia admitted to [facility] for conservative vs surgical management of SBO. 89 yo F with PMH of HTN, visual/auditory hallucinations, s/p cholecystectomy, s/p WILL, L inguinal hernia admitted to [facility] for conservative vs surgical management of SBO, NGT removed today by surgery. 89 yo F with PMH of HTN, visual/auditory hallucinations, s/p cholecystectomy, s/p WILL, L inguinal hernia admitted to [facility] for SBO, managed conservatively, NGT removed 11/27 surgery.

## 2018-11-27 NOTE — PROGRESS NOTE ADULT - SUBJECTIVE AND OBJECTIVE BOX
Chief Complaint: Abdominal pain, nausea    Interval Events: Orthostatics positive yesterday. No complaints today.    Review of Systems:  General: No fevers, chills, weight loss or gain  Skin: No rashes, color changes  Cardiovascular: No chest pain, orthopnea  Respiratory: No shortness of breath, cough  Gastrointestinal: No nausea, abdominal pain  Genitourinary: No incontinence, pain with urination  Musculoskeletal: No pain, swelling, decreased range of motion  Neurological: No headache, weakness  Psychiatric: No depression, anxiety  Endocrine: No weight loss or gain, increased thirst  All other systems are comprehensively negative.    Physical Exam:  Vitals:        Vital Signs Last 24 Hrs  T(C): 36.7 (27 Nov 2018 05:19), Max: 36.7 (26 Nov 2018 09:15)  T(F): 98 (27 Nov 2018 05:19), Max: 98 (26 Nov 2018 09:15)  HR: 81 (27 Nov 2018 05:19) (71 - 88)  BP: 154/89 (27 Nov 2018 05:19) (96/67 - 154/89)  BP(mean): --  RR: 18 (27 Nov 2018 05:19) (16 - 18)  SpO2: 98% (27 Nov 2018 05:19) (92% - 98%)  General: NAD  HEENT: MMM  Neck: No JVD, no carotid bruit  Lungs: CTAB  CV: RRR, nl S1/S2, no M/R/G  Abdomen: S/NT/ND, +BS  Extremities: No LE edema, no cyanosis  Neuro: AAOx3, non-focal  Skin: No rash    Labs:                        14.3   9.07  )-----------( 213      ( 26 Nov 2018 09:09 )             41.3     11-26    138  |  101  |  14  ----------------------------<  73  3.6   |  26  |  0.32<L>    Ca    7.3<L>      26 Nov 2018 09:09  Phos  2.5     11-26  Mg     1.6     11-26    TPro  5.6<L>  /  Alb  2.2<L>  /  TBili  0.8  /  DBili  x   /  AST  19  /  ALT  17  /  AlkPhos  36<L>  11-26    CARDIAC MARKERS ( 26 Nov 2018 18:58 )  .023 ng/mL / x     / x     / x     / x      CARDIAC MARKERS ( 26 Nov 2018 13:32 )  <.015 ng/mL / x     / x     / x     / x              Telemetry: Sinus rhythm

## 2018-11-27 NOTE — PROGRESS NOTE ADULT - ASSESSMENT
IMPRESSION resolving SBO-xrays with contrast in colon  PLAN advance diet, give supplements           decrease IVF

## 2018-11-27 NOTE — PROGRESS NOTE ADULT - ASSESSMENT
The patient is a 89 year old female with a history of HTN who was admitted with SBO.    Plan:  - Syncope likely due to vasovagal and orthostatic hypotension  - Orthostatics positive (123 to 96 systolic). Recheck today.  - Continue IV fluids  - Discontinue metoprolol. Can resume when orthostatics negative.  - Surgery follow-up

## 2018-11-27 NOTE — PROGRESS NOTE ADULT - PROBLEM SELECTOR PLAN 3
Pt with hx of sundowning, agitation likely 2/2 worsening dementia  - Refused interventions, imaging, etc at nighttime, however is amenable to most interventions during day.   - c/w Haldol 0.5mg IM q6h PRN for agitation. Resolved, remains afebrile  - Pt without evidence for acute abdomen, denies pain. Abd NT, ND.   - Continue to monitor, f/u AM CBC. Resolved, remains afebrile  - Pt without evidence for acute abdomen, NT, ND and denies pain.  - Continue to monitor, f/u AM CBC.

## 2018-11-27 NOTE — PROGRESS NOTE ADULT - PROBLEM SELECTOR PLAN 7
- Advance care planning discussed with family. Pt to remain DNR.  Completed MOLST form placed in chart. - Advance care planning discussed with family. Pt to remain DNR.  Completed MOLST form placed in chart.  - F/u with PT.   - Discharge planning, hopefully for tomorrow.

## 2018-11-27 NOTE — PROGRESS NOTE ADULT - SUBJECTIVE AND OBJECTIVE BOX
Subjective: Pt with severe diarrhea yesterday with episode of near syncopy better now, without abdominal pain.    Objective:  Vital Signs Last 24 Hrs  T(C): 36.7 (27 Nov 2018 05:19), Max: 36.7 (26 Nov 2018 11:39)  T(F): 98 (27 Nov 2018 05:19), Max: 98 (26 Nov 2018 11:39)  HR: 81 (27 Nov 2018 05:19) (71 - 88)  BP: 154/89 (27 Nov 2018 05:19) (96/67 - 154/89)  BP(mean): --  RR: 18 (27 Nov 2018 05:19) (17 - 18)  SpO2: 98% (27 Nov 2018 05:19) (97% - 98%)    Heent: BETY CASTANEDA  Pul: clear  Cor: RSR, without murmurs  Abdomen: normal bowel sounds, without distension or tenderness  Extremities: without edema, motor/sensory intact, without calf pain, Homans sign negative.                        13.8   7.39  )-----------( 210      ( 27 Nov 2018 08:55 )             40.2       11-27    138  |  99  |  12  ----------------------------<  66<L>  4.0   |  29  |  0.41<L>    Ca    7.7<L>      27 Nov 2018 08:55  Phos  2.7     11-27  Mg     1.6     11-27    TPro  5.6<L>  /  Alb  2.2<L>  /  TBili  0.5  /  DBili  x   /  AST  17  /  ALT  17  /  AlkPhos  35<L>  11-27 11-26 @ 07:01  -  11-27 @ 07:00  --------------------------------------------------------  IN:    sodium chloride 0.45% with potassium chloride 20 mEq/L: 675 mL  Total IN: 675 mL    OUT:    Nasoenteral Tube: 100 mL    Voided: 300 mL  Total OUT: 400 mL    Total NET: 275 mL

## 2018-11-27 NOTE — PROGRESS NOTE ADULT - SUBJECTIVE AND OBJECTIVE BOX
Neurology Follow up note    ROD POST89yFemale    HPI:  89 YO F PMHx HTN, visual/auditory hallucinations, s/p cholecystectomy, s/p WILL, L inguinal hernia who presents to ED with chief complaint of abdominal pain and vomiting. Patient states that 2 days ago, she began having abdominal pain. This pain resolved, patient was able to eat and have BMs. Today after eating dinner around 6 pm, patient began having worsening abdominal pain, nausea and vomiting. States she vomited 3-4 times with brownish liquid consistency, NBNB. Patient endorses BM this morning. She denies recent change in stool caliber or consistency, no melena, no hematochezia, no fever/chills. Denies recent travel or change in diet. Of note, patient states she does have issues with chronic constipation and sometimes diarrhea, questionable history of IBS. Patient takes miralax almost daily when constipated. Denies HAs, CP, SOB. Patient mentions that she has L inguinal hernia, has been seen by Dr. Fernandez for it, no intervention to date.     In the ED, vitals T 97.6F, HR 84, /83, RR 19, 97% on RA. CBC normal WBC with left shift 86.4% neutrophils and CMP WNL. UA small ketones, small blood, 3-5 RBCs, few bacteria. CT Abdomen and Pelvis Small bowel obstruction with the transition point in the left mid abdomen near the abdominal wall. Bowel containing left inguinal hernia without strangulation or incarceration. EKG: NSR (unofficial read)    Given IV zofran 4mg, IV tylenol x 1 and 1L NS bolus. (18 Nov 2018 01:05)      Interval History - doing better    Patient is seen, chart was reviewed and case was discussed with the treatment team.  Pt is not in any distress.   Lying on bed comfortably.   No events reported overnight.   No clinical seizure was reported.  Sitting on chair bed comfortably.    is at bedside.    Vital Signs Last 24 Hrs  T(C): 36.6 (27 Nov 2018 14:13), Max: 36.7 (27 Nov 2018 05:19)  T(F): 97.8 (27 Nov 2018 14:13), Max: 98 (27 Nov 2018 05:19)  HR: 86 (27 Nov 2018 14:13) (71 - 86)  BP: 112/67 (27 Nov 2018 14:13) (112/67 - 154/89)  BP(mean): --  RR: 18 (27 Nov 2018 14:13) (18 - 18)  SpO2: 96% (27 Nov 2018 14:13) (96% - 98%))        REVIEW OF SYSTEMS:        On Neurological Examination:    Mental Status - Pt is alert, awake, oriented X1 . Follows commands     Speech -  Normal.    Cranial Nerves - Pupils 3 mm equal and reactive to light, extraocular eye movements intact. Pt has no visual field deficit.  Pt has no  facial asymmetry. Facial sensation is intact.Tongue - is in midline.    Muscle tone - normal,.      Motor Exam - 5/5 .    Sensory Exam -. Pt withdraws all extremities equally on stimulation. No asymmetry seen. No complaints of tingling, numbness.    Gait - Able to stand and walk .      coordination:    Finger to nose: normal    Deep tendon Reflexes - 2 plus all over.          Neck Supple -  Yes.     MEDICATIONS    enoxaparin Injectable 40 milliGRAM(s) SubCutaneous daily  haloperidol    Injectable 0.5 milliGRAM(s) IntraMuscular every 6 hours PRN  metoprolol tartrate Injectable 5 milliGRAM(s) IV Push every 6 hours  ondansetron Injectable 4 milliGRAM(s) IV Push every 6 hours PRN  sodium chloride 0.45% with potassium chloride 20 mEq/L 1000 milliLiter(s) IV Continuous <Continuous>      Allergies    Demerol (Unknown)    Intolerances        LABS:  CBC Full  -  ( 24 Nov 2018 08:39 )  WBC Count : 8.21 K/uL  Hemoglobin : 14.2 g/dL  Hematocrit : 41.0 %  Platelet Count - Automated : 205 K/uL  Mean Cell Volume : 92.1 fl  Mean Cell Hemoglobin : 31.9 pg  Mean Cell Hemoglobin Concentration : 34.6 gm/dL  Auto Neutrophil # : 6.91 K/uL  Auto Lymphocyte # : 0.86 K/uL  Auto Monocyte # : 0.40 K/uL  Auto Eosinophil # : 0.00 K/uL  Auto Basophil # : 0.01 K/uL  Auto Neutrophil % : 84.1 %  Auto Lymphocyte % : 10.5 %  Auto Monocyte % : 4.9 %  Auto Eosinophil % : 0.0 %  Auto Basophil % : 0.1 %      11-24    142  |  103  |  17  ----------------------------<  81  3.4<L>   |  29  |  0.38<L>    Ca    7.6<L>      24 Nov 2018 08:39  Phos  2.7     11-24  Mg     1.8     11-24    TPro  5.8<L>  /  Alb  2.3<L>  /  TBili  1.5<H>  /  DBili  x   /  AST  16  /  ALT  16  /  AlkPhos  37<L>  11-24    Hemoglobin A1C:     Vitamin B12     RADIOLOGY    ASSESSMENT AND PLAN:      ams.  dementia with behavioral disturbance, calmer.  SP SBO.    HALDOL PRN FOR AGITATION.  Physical therapy evaluation.  OOB to chair/ambulation with assistance only.  Pain is accessed and addressed.  Plan of care was discussed with family. Questions answered.  Would continue to follow.

## 2018-11-27 NOTE — PHYSICAL THERAPY INITIAL EVALUATION ADULT - ADDITIONAL COMMENTS
Pt lives with her son in a house, no steps. Pt ambulates independently and uses SC or RW as needed. Dtr assists with ADLs

## 2018-11-27 NOTE — PROGRESS NOTE ADULT - PROBLEM SELECTOR PLAN 6
- Advance care planning discussed with family. Pt to remain DNR.  Completed MOLST form placed in chart. - Lovenox 40mg SubQ qd for DVT PPx

## 2018-11-27 NOTE — PHYSICAL THERAPY INITIAL EVALUATION ADULT - PERTINENT HX OF CURRENT PROBLEM, REHAB EVAL
89 y/o female adm 11/18 with abdominal pain and vomiting. +SBO- treated conservatively. Pt had rapid response 11/26

## 2018-11-27 NOTE — CHART NOTE - NSCHARTNOTEFT_GEN_A_CORE
Assessment: patient seen with family at bedside. now NGT removed. per family patient to have liquids MD was just seeing patient.     Factors impacting intake: [ ] none [ ] nausea  [ ] vomiting [ ] diarrhea [ ] constipation  [ ]chewing problems [ ] swallowing issues  [ x] other: NGT removed diet to be advanced per family    Diet Presciption:     Current Weight: no new weight noted      Pertinent Medications: MEDICATIONS  (STANDING):  enoxaparin Injectable 40 milliGRAM(s) SubCutaneous daily  sodium chloride 0.45% with potassium chloride 20 mEq/L 1000 milliLiter(s) (30 mL/Hr) IV Continuous <Continuous>    MEDICATIONS  (PRN):  haloperidol    Injectable 0.5 milliGRAM(s) IntraMuscular every 6 hours PRN Agitation  ondansetron Injectable 4 milliGRAM(s) IV Push every 6 hours PRN Nausea and/or Vomiting    Pertinent Labs: 11-27 Na138 mmol/L Glu 66 mg/dL<L> K+ 4.0 mmol/L Cr  0.41 mg/dL<L> BUN 12 mg/dL 11-27 Phos 2.7 mg/dL 11-27 Alb 2.2 g/dL<L>      POCT Blood Glucose.: 79 mg/dL (26 Nov 2018 11:00)    Skin: liana 16 no pressure injury    Estimated Needs:   [x ] no change since previous assessment  [ ] recalculated:     Previous Nutrition Diagnosis:   [ ] Inadequate Energy Intake [ ]Inadequate Oral Intake [ ] Excessive Energy Intake   [ ] Underweight [ ] Increased Nutrient Needs [ ] Overweight/Obesity   [x ] Altered GI Function [ ] Unintended Weight Loss [ ] Food & Nutrition Related Knowledge Deficit [ ] Malnutrition     Nutrition Diagnosis is [x ] ongoing but with NGT removed now resolving SBO   [ ] resolved [ ] not applicable     New Nutrition Diagnosis: [ x] not applicable       Interventions:   Recommend  [ ] Change Diet To:  [x ] Nutrition Supplement add ensure clear TID when diet advanced  [ ] Nutrition Support  [x ] Other: advance to clear liqiuds to full fluids as appropriate     Monitoring and Evaluation:   [x ] PO intake [ x ] Tolerance to diet prescription [ x ] weights [ x ] labs[ x ] follow up per protocol  [ ] other: Assessment: patient seen with family at bedside. now NGT removed. per family patient to have liquids MD was just seeing patient.     Factors impacting intake: [ ] none [ ] nausea  [ ] vomiting [ ] diarrhea [ ] constipation  [ ]chewing problems [ ] swallowing issues  [ x] other: NGT removed diet to be advanced per family    Diet Presciption: full liquids ensure enlive TID order just placed    Current Weight: no new weight noted      Pertinent Medications: MEDICATIONS  (STANDING):  enoxaparin Injectable 40 milliGRAM(s) SubCutaneous daily  sodium chloride 0.45% with potassium chloride 20 mEq/L 1000 milliLiter(s) (30 mL/Hr) IV Continuous <Continuous>    MEDICATIONS  (PRN):  haloperidol    Injectable 0.5 milliGRAM(s) IntraMuscular every 6 hours PRN Agitation  ondansetron Injectable 4 milliGRAM(s) IV Push every 6 hours PRN Nausea and/or Vomiting    Pertinent Labs: 11-27 Na138 mmol/L Glu 66 mg/dL<L> K+ 4.0 mmol/L Cr  0.41 mg/dL<L> BUN 12 mg/dL 11-27 Phos 2.7 mg/dL 11-27 Alb 2.2 g/dL<L>      POCT Blood Glucose.: 79 mg/dL (26 Nov 2018 11:00)    Skin: liana 16 no pressure injury    Estimated Needs:   [x ] no change since previous assessment  [ ] recalculated:     Previous Nutrition Diagnosis:   [ ] Inadequate Energy Intake [ ]Inadequate Oral Intake [ ] Excessive Energy Intake   [ ] Underweight [ ] Increased Nutrient Needs [ ] Overweight/Obesity   [x ] Altered GI Function [ ] Unintended Weight Loss [ ] Food & Nutrition Related Knowledge Deficit [ ] Malnutrition     Nutrition Diagnosis is [x ] ongoing but with NGT removed now resolving SBO   [ ] resolved [ ] not applicable     New Nutrition Diagnosis: [ x] not applicable       Interventions:   Recommend  [ ] Change Diet To:  [x ] Nutrition Supplement add ensure enlive TID ordered now  [ ] Nutrition Support  [x ] Other: provide full liquids as ordered follow tolerance    Monitoring and Evaluation:   [x ] PO intake [ x ] Tolerance to diet prescription [ x ] weights [ x ] labs[ x ] follow up per protocol  [ ] other:

## 2018-11-27 NOTE — PROGRESS NOTE ADULT - PROBLEM SELECTOR PLAN 5
- Lovenox 40mg SubQ qd for DVT PPx Chronic, stable  - Continue IV Lopressor 5mg q6h in lieu of cardizem. Continue w/ hold parameters. Chronic, stable  - Patient was receiving IV Metoprolol 5mg q6h in lieu of Cardizem.   - Per cardio, holding Metoprolol and can resume when orthostatics negative.  - Continue to monitor routine hemodynamics.

## 2018-11-28 DIAGNOSIS — S69.90XA UNSPECIFIED INJURY OF UNSPECIFIED WRIST, HAND AND FINGER(S), INITIAL ENCOUNTER: ICD-10-CM

## 2018-11-28 LAB
ALBUMIN SERPL ELPH-MCNC: 2.2 G/DL — LOW (ref 3.3–5)
ALP SERPL-CCNC: 38 U/L — LOW (ref 40–120)
ALT FLD-CCNC: 18 U/L — SIGNIFICANT CHANGE UP (ref 12–78)
ANION GAP SERPL CALC-SCNC: 7 MMOL/L — SIGNIFICANT CHANGE UP (ref 5–17)
AST SERPL-CCNC: 22 U/L — SIGNIFICANT CHANGE UP (ref 15–37)
BASOPHILS # BLD AUTO: 0.03 K/UL — SIGNIFICANT CHANGE UP (ref 0–0.2)
BASOPHILS NFR BLD AUTO: 0.4 % — SIGNIFICANT CHANGE UP (ref 0–2)
BILIRUB SERPL-MCNC: 0.5 MG/DL — SIGNIFICANT CHANGE UP (ref 0.2–1.2)
BUN SERPL-MCNC: 8 MG/DL — SIGNIFICANT CHANGE UP (ref 7–23)
CALCIUM SERPL-MCNC: 7.8 MG/DL — LOW (ref 8.5–10.1)
CHLORIDE SERPL-SCNC: 98 MMOL/L — SIGNIFICANT CHANGE UP (ref 96–108)
CO2 SERPL-SCNC: 32 MMOL/L — HIGH (ref 22–31)
CREAT SERPL-MCNC: 0.38 MG/DL — LOW (ref 0.5–1.3)
EOSINOPHIL # BLD AUTO: 0 K/UL — SIGNIFICANT CHANGE UP (ref 0–0.5)
EOSINOPHIL NFR BLD AUTO: 0 % — SIGNIFICANT CHANGE UP (ref 0–6)
GLUCOSE SERPL-MCNC: 99 MG/DL — SIGNIFICANT CHANGE UP (ref 70–99)
HCT VFR BLD CALC: 40.6 % — SIGNIFICANT CHANGE UP (ref 34.5–45)
HGB BLD-MCNC: 14.2 G/DL — SIGNIFICANT CHANGE UP (ref 11.5–15.5)
IMM GRANULOCYTES NFR BLD AUTO: 1 % — SIGNIFICANT CHANGE UP (ref 0–1.5)
LYMPHOCYTES # BLD AUTO: 0.68 K/UL — LOW (ref 1–3.3)
LYMPHOCYTES # BLD AUTO: 9.9 % — LOW (ref 13–44)
MAGNESIUM SERPL-MCNC: 1.6 MG/DL — SIGNIFICANT CHANGE UP (ref 1.6–2.6)
MCHC RBC-ENTMCNC: 31.8 PG — SIGNIFICANT CHANGE UP (ref 27–34)
MCHC RBC-ENTMCNC: 35 GM/DL — SIGNIFICANT CHANGE UP (ref 32–36)
MCV RBC AUTO: 90.8 FL — SIGNIFICANT CHANGE UP (ref 80–100)
MONOCYTES # BLD AUTO: 0.45 K/UL — SIGNIFICANT CHANGE UP (ref 0–0.9)
MONOCYTES NFR BLD AUTO: 6.5 % — SIGNIFICANT CHANGE UP (ref 2–14)
NEUTROPHILS # BLD AUTO: 5.67 K/UL — SIGNIFICANT CHANGE UP (ref 1.8–7.4)
NEUTROPHILS NFR BLD AUTO: 82.2 % — HIGH (ref 43–77)
PHOSPHATE SERPL-MCNC: 2.8 MG/DL — SIGNIFICANT CHANGE UP (ref 2.5–4.5)
PLATELET # BLD AUTO: 217 K/UL — SIGNIFICANT CHANGE UP (ref 150–400)
POTASSIUM SERPL-MCNC: 3.4 MMOL/L — LOW (ref 3.5–5.3)
POTASSIUM SERPL-SCNC: 3.4 MMOL/L — LOW (ref 3.5–5.3)
PROT SERPL-MCNC: 5.8 G/DL — LOW (ref 6–8.3)
RBC # BLD: 4.47 M/UL — SIGNIFICANT CHANGE UP (ref 3.8–5.2)
RBC # FLD: 12 % — SIGNIFICANT CHANGE UP (ref 10.3–14.5)
SODIUM SERPL-SCNC: 137 MMOL/L — SIGNIFICANT CHANGE UP (ref 135–145)
WBC # BLD: 6.9 K/UL — SIGNIFICANT CHANGE UP (ref 3.8–10.5)
WBC # FLD AUTO: 6.9 K/UL — SIGNIFICANT CHANGE UP (ref 3.8–10.5)

## 2018-11-28 PROCEDURE — 73130 X-RAY EXAM OF HAND: CPT | Mod: 26,RT

## 2018-11-28 RX ORDER — MAGNESIUM SULFATE 500 MG/ML
2 VIAL (ML) INJECTION ONCE
Qty: 0 | Refills: 0 | Status: COMPLETED | OUTPATIENT
Start: 2018-11-28 | End: 2018-11-28

## 2018-11-28 RX ORDER — DEXTROSE MONOHYDRATE, SODIUM CHLORIDE, AND POTASSIUM CHLORIDE 50; .745; 4.5 G/1000ML; G/1000ML; G/1000ML
1000 INJECTION, SOLUTION INTRAVENOUS
Qty: 0 | Refills: 0 | Status: DISCONTINUED | OUTPATIENT
Start: 2018-11-28 | End: 2018-11-29

## 2018-11-28 RX ORDER — POTASSIUM CHLORIDE 20 MEQ
40 PACKET (EA) ORAL ONCE
Qty: 0 | Refills: 0 | Status: COMPLETED | OUTPATIENT
Start: 2018-11-28 | End: 2018-11-28

## 2018-11-28 RX ORDER — SODIUM CHLORIDE 9 MG/ML
1000 INJECTION INTRAMUSCULAR; INTRAVENOUS; SUBCUTANEOUS ONCE
Qty: 0 | Refills: 0 | Status: COMPLETED | OUTPATIENT
Start: 2018-11-28 | End: 2018-11-28

## 2018-11-28 RX ADMIN — Medication 40 MILLIEQUIVALENT(S): at 11:05

## 2018-11-28 RX ADMIN — DEXTROSE MONOHYDRATE, SODIUM CHLORIDE, AND POTASSIUM CHLORIDE 50 MILLILITER(S): 50; .745; 4.5 INJECTION, SOLUTION INTRAVENOUS at 18:59

## 2018-11-28 RX ADMIN — Medication 100 MILLIGRAM(S): at 21:20

## 2018-11-28 RX ADMIN — ENOXAPARIN SODIUM 40 MILLIGRAM(S): 100 INJECTION SUBCUTANEOUS at 11:06

## 2018-11-28 RX ADMIN — Medication 100 MILLIGRAM(S): at 14:08

## 2018-11-28 RX ADMIN — Medication 50 GRAM(S): at 11:05

## 2018-11-28 RX ADMIN — Medication 100 MILLIGRAM(S): at 05:29

## 2018-11-28 RX ADMIN — SODIUM CHLORIDE 2000 MILLILITER(S): 9 INJECTION INTRAMUSCULAR; INTRAVENOUS; SUBCUTANEOUS at 13:40

## 2018-11-28 NOTE — PROGRESS NOTE ADULT - PROBLEM SELECTOR PLAN 7
- Advance care planning discussed with family. Pt to remain DNR.  Completed MOLST form placed in chart.  - PT recs DC to [facility]  - Discharge planning, hopefully for tomorrow.

## 2018-11-28 NOTE — PROGRESS NOTE ADULT - SUBJECTIVE AND OBJECTIVE BOX
Chief Complaint: Abdominal pain, nausea    Interval Events: No events overnight. No complaints.    Review of Systems:  General: No fevers, chills, weight loss or gain  Skin: No rashes, color changes  Cardiovascular: No chest pain, orthopnea  Respiratory: No shortness of breath, cough  Gastrointestinal: No nausea, abdominal pain  Genitourinary: No incontinence, pain with urination  Musculoskeletal: No pain, swelling, decreased range of motion  Neurological: No headache, weakness  Psychiatric: No depression, anxiety  Endocrine: No weight loss or gain, increased thirst  All other systems are comprehensively negative.    Physical Exam:  Vital Signs Last 24 Hrs  T(C): 37.1 (28 Nov 2018 05:18), Max: 37.1 (28 Nov 2018 05:18)  T(F): 98.7 (28 Nov 2018 05:18), Max: 98.7 (28 Nov 2018 05:18)  HR: 83 (28 Nov 2018 05:18) (79 - 86)  BP: 160/84 (28 Nov 2018 05:18) (112/67 - 160/84)  BP(mean): --  RR: 17 (28 Nov 2018 05:18) (17 - 18)  SpO2: 99% (28 Nov 2018 05:18) (96% - 99%)  General: NAD  HEENT: MMM  Neck: No JVD, no carotid bruit  Lungs: CTAB  CV: RRR, nl S1/S2, no M/R/G  Abdomen: S/NT/ND, +BS  Extremities: No LE edema, no cyanosis  Neuro: AAOx3, non-focal  Skin: No rash    Labs:    11-27    138  |  99  |  12  ----------------------------<  66<L>  4.0   |  29  |  0.41<L>    Ca    7.7<L>      27 Nov 2018 08:55  Phos  2.7     11-27  Mg     1.6     11-27    TPro  5.6<L>  /  Alb  2.2<L>  /  TBili  0.5  /  DBili  x   /  AST  17  /  ALT  17  /  AlkPhos  35<L>  11-27                        13.8   7.39  )-----------( 210      ( 27 Nov 2018 08:55 )             40.2       Telemetry: Sinus rhythm

## 2018-11-28 NOTE — PROGRESS NOTE ADULT - SUBJECTIVE AND OBJECTIVE BOX
PT seen  no complaints  tolerated liquids  ICU Vital Signs Last 24 Hrs  T(C): 37.1 (28 Nov 2018 05:18), Max: 37.1 (28 Nov 2018 05:18)  T(F): 98.7 (28 Nov 2018 05:18), Max: 98.7 (28 Nov 2018 05:18)  HR: 83 (28 Nov 2018 05:18) (79 - 86)  BP: 160/84 (28 Nov 2018 05:18) (112/67 - 160/84)  BP(mean): --  ABP: --  ABP(mean): --  RR: 17 (28 Nov 2018 05:18) (17 - 18)  SpO2: 99% (28 Nov 2018 05:18) (96% - 99%)  gen-NAD  resp-clear  abd-soft NT/ND                          14.2   6.90  )-----------( 217      ( 28 Nov 2018 08:55 )             40.6   11-28    137  |  98  |  8   ----------------------------<  99  3.4<L>   |  32<H>  |  0.38<L>    Ca    7.8<L>      28 Nov 2018 08:55  Phos  2.8     11-28  Mg     1.6     11-28    TPro  5.8<L>  /  Alb  2.2<L>  /  TBili  0.5  /  DBili  x   /  AST  22  /  ALT  18  /  AlkPhos  38<L>  11-28

## 2018-11-28 NOTE — PROGRESS NOTE ADULT - PROBLEM SELECTOR PLAN 1
Resolving SBO  - KUB 11/25 showing nonspecific bowel-gas pattern, large amount of stool in colon. No dilated air-filled loops.   - KUB 11/26 showing no definite bowel obstruction.  - Xray small bowel with gastro 11/27 showing no evidence of small bowel obstruction.  - Surgery following (Dr. Mauricio), removed NGT yesterday.  - Started on clears, tolerated well overnight. Per surgery, advance diet as tolerated  - IVF decreased to 30ml/hr Resolving SBO  - KUB 11/25 showing nonspecific bowel-gas pattern, large amount of stool in colon. No dilated air-filled loops.   - KUB 11/26 showing no definite bowel obstruction.  - Xray small bowel with gastro 11/27 showing no evidence of small bowel obstruction.  - Surgery following (Dr. Mauricio), removed NGT yesterday.  - on full liquids, tolerated well overnight. Per surgery, advance diet as tolerated  - IVF decreased to 30ml/hr

## 2018-11-28 NOTE — PROGRESS NOTE ADULT - PROBLEM SELECTOR PLAN 2
Likely 2/2 vasovagal and orthostatic hypotension  - Cardio (Dr. Mckeon) following after rapid response for near-syncopal event 11/26  - Orthostatics positive yesterday (123 to 92 systolic). F/u rpt orthostatics.  - Per cardio, discontinued metoprolol and can resume when orthostatics negative. Likely 2/2 vasovagal and orthostatic hypotension  - Cardio (Dr. Mckeon) following after rapid response for near-syncopal event 11/26  - Orthostatics positive yesterday (123 to 92 systolic). Rpt orthostatics positive today (158 to 124 systolic).  - Per cardio, discontinued metoprolol and can resume when orthostatics negative.

## 2018-11-28 NOTE — PROGRESS NOTE ADULT - SUBJECTIVE AND OBJECTIVE BOX
Patient is a 89y old  Female who presents with a chief complaint of Abdominal pain, vomiting (27 Nov 2018 19:00)      INTERVAL HPI/OVERNIGHT EVENTS: Pt seen and examined at bedside. Pt states she coughed briefly after eating Jello yesterday, but tolerated soup and Ensure well. No BM, but is urinating well. Denies vomiting overnight. Denies Abd pain, N/V/D.    T(C): 37.1 (11-28-18 @ 05:18), Max: 37.1 (11-28-18 @ 05:18)  HR: 83 (11-28-18 @ 05:18) (79 - 86)  BP: 160/84 (11-28-18 @ 05:18) (112/67 - 160/84)  RR: 17 (11-28-18 @ 05:18) (17 - 18)  SpO2: 99% (11-28-18 @ 05:18) (96% - 99%)  Wt(kg): --  I&O's Summary    27 Nov 2018 07:01  -  28 Nov 2018 07:00  --------------------------------------------------------  IN: 390 mL / OUT: 0 mL / NET: 390 mL        LABS:                        14.2   6.90  )-----------( 217      ( 28 Nov 2018 08:55 )             40.6     11-27    138  |  99  |  12  ----------------------------<  66<L>  4.0   |  29  |  0.41<L>    Ca    7.7<L>      27 Nov 2018 08:55  Phos  2.7     11-27  Mg     1.6     11-27    TPro  5.6<L>  /  Alb  2.2<L>  /  TBili  0.5  /  DBili  x   /  AST  17  /  ALT  17  /  AlkPhos  35<L>  11-27        CAPILLARY BLOOD GLUCOSE                MEDICATIONS  (STANDING):  docusate sodium 100 milliGRAM(s) Oral three times a day  enoxaparin Injectable 40 milliGRAM(s) SubCutaneous daily  sodium chloride 0.45% with potassium chloride 20 mEq/L 1000 milliLiter(s) (30 mL/Hr) IV Continuous <Continuous>    MEDICATIONS  (PRN):  haloperidol    Injectable 0.5 milliGRAM(s) IntraMuscular every 6 hours PRN Agitation  ondansetron Injectable 4 milliGRAM(s) IV Push every 6 hours PRN Nausea and/or Vomiting      REVIEW OF SYSTEMS:  CONSTITUTIONAL: No fever, weight loss, or fatigue  EYES: No eye pain, visual disturbances, or discharge  ENMT:  No difficulty hearing, tinnitus, vertigo; No sinus or throat pain  NECK: No pain or stiffness  RESPIRATORY: No cough, wheezing, chills or hemoptysis; No shortness of breath  CARDIOVASCULAR: No chest pain, palpitations, dizziness, or leg swelling  GASTROINTESTINAL: No abdominal or epigastric pain. No nausea, vomiting, or hematemesis; No diarrhea or constipation. No melena or hematochezia.  GENITOURINARY: No dysuria, frequency, hematuria, or incontinence  NEUROLOGICAL: No headaches, memory loss, loss of strength, numbness, or tremors  SKIN: No itching, burning, rashes, or lesions   MUSCULOSKELETAL: No joint pain or swelling; No muscle, back, or extremity pain  PSYCHIATRIC: No depression, anxiety, mood swings, or difficulty sleeping    RADIOLOGY & ADDITIONAL TESTS:    Imaging Personally Reviewed:  [ x ] YES  [ ] NO    Consultant(s) Notes Reviewed:  [ x ] YES  [ ] NO    PHYSICAL EXAM:  GENERAL: elderly female in NAD  HEAD:  Atraumatic, Normocephalic  EYES: EOMI, PERRLA, conjunctiva and sclera clear  ENMT: No tonsillar erythema, exudates, or enlargement; Moist mucous membranes, Good dentition, No lesions  NECK: Supple, No JVD, Normal thyroid  NERVOUS SYSTEM:  Alert & Oriented X1, Good concentration; Motor Strength 5/5 B/L upper and lower extremities  CHEST/LUNG: Clear to auscultation bilaterally; No rales, rhonchi, wheezing, or rubs  HEART: Regular rate and rhythm; No murmurs, rubs, or gallops  ABDOMEN: Soft, Nontender, Nondistended; Bowel sounds present  EXTREMITIES:  2+ Peripheral Pulses, No clubbing, cyanosis, or edema  LYMPH: No lymphadenopathy noted  SKIN: No rashes or lesions    Care Discussed with Consultants/Other Providers [ x ] YES  [ ] NO Patient is a 89y old  Female who presents with a chief complaint of Abdominal pain, vomiting (27 Nov 2018 19:00)      INTERVAL HPI/OVERNIGHT EVENTS: Pt seen and examined at bedside. Pt states she coughed briefly after eating Jello yesterday, but tolerated soup and Ensure well. No BM, but is urinating well. Denies vomiting overnight. Denies Abd pain, N/V/D.    T(C): 37.1 (11-28-18 @ 05:18), Max: 37.1 (11-28-18 @ 05:18)  HR: 83 (11-28-18 @ 05:18) (79 - 86)  BP: 160/84 (11-28-18 @ 05:18) (112/67 - 160/84)  RR: 17 (11-28-18 @ 05:18) (17 - 18)  SpO2: 99% (11-28-18 @ 05:18) (96% - 99%)  Wt(kg): --  I&O's Summary    27 Nov 2018 07:01  -  28 Nov 2018 07:00  --------------------------------------------------------  IN: 390 mL / OUT: 0 mL / NET: 390 mL        LABS:                        14.2   6.90  )-----------( 217      ( 28 Nov 2018 08:55 )             40.6     11-27    138  |  99  |  12  ----------------------------<  66<L>  4.0   |  29  |  0.41<L>    Ca    7.7<L>      27 Nov 2018 08:55  Phos  2.7     11-27  Mg     1.6     11-27    TPro  5.6<L>  /  Alb  2.2<L>  /  TBili  0.5  /  DBili  x   /  AST  17  /  ALT  17  /  AlkPhos  35<L>  11-27        CAPILLARY BLOOD GLUCOSE                MEDICATIONS  (STANDING):  docusate sodium 100 milliGRAM(s) Oral three times a day  enoxaparin Injectable 40 milliGRAM(s) SubCutaneous daily  sodium chloride 0.45% with potassium chloride 20 mEq/L 1000 milliLiter(s) (30 mL/Hr) IV Continuous <Continuous>    MEDICATIONS  (PRN):  haloperidol    Injectable 0.5 milliGRAM(s) IntraMuscular every 6 hours PRN Agitation  ondansetron Injectable 4 milliGRAM(s) IV Push every 6 hours PRN Nausea and/or Vomiting      REVIEW OF SYSTEMS:  CONSTITUTIONAL: No fever, weight loss, or fatigue  EYES: No eye pain, visual disturbances, or discharge  ENMT:  No difficulty hearing, tinnitus, vertigo; No sinus or throat pain  NECK: No pain or stiffness  RESPIRATORY: No cough, wheezing, chills or hemoptysis; No shortness of breath  CARDIOVASCULAR: No chest pain, palpitations, dizziness, or leg swelling  GASTROINTESTINAL: No abdominal or epigastric pain. No nausea, vomiting, or hematemesis; No diarrhea or constipation. No melena or hematochezia.  GENITOURINARY: No dysuria, frequency, hematuria, or incontinence  NEUROLOGICAL: No headaches, memory loss, loss of strength, numbness, or tremors  SKIN: No itching, burning, rashes, or lesions   MUSCULOSKELETAL: No joint pain or swelling; No muscle, back, or extremity pain  PSYCHIATRIC: No depression, anxiety, mood swings, or difficulty sleeping    RADIOLOGY & ADDITIONAL TESTS:    Imaging Personally Reviewed:  [ x ] YES  [ ] NO    Consultant(s) Notes Reviewed:  [ x ] YES  [ ] NO    PHYSICAL EXAM:  GENERAL: elderly female in NAD  HEAD:  Atraumatic, Normocephalic  EYES: EOMI, PERRLA, conjunctiva and sclera clear  ENMT: No tonsillar erythema, exudates, or enlargement; Moist mucous membranes, Good dentition, No lesions  NECK: Supple, No JVD, Normal thyroid  NERVOUS SYSTEM:  Alert & Oriented X1, Good concentration; Motor Strength 5/5 B/L upper and lower extremities  CHEST/LUNG: Clear to auscultation bilaterally; No rales, rhonchi, wheezing, or rubs  HEART: Regular rate and rhythm; No murmurs, rubs, or gallops  ABDOMEN: Soft, Nontender, Nondistended; Bowel sounds present  EXTREMITIES:  2+ Peripheral Pulses, Pt noted to have mild erythema and swelling in dorsal aspect of R 5th digit from MCP joint to distal end of finger.  LYMPH: No lymphadenopathy noted  SKIN: No rashes or lesions    Care Discussed with Consultants/Other Providers [ x ] YES  [ ] NO

## 2018-11-28 NOTE — PROGRESS NOTE ADULT - ASSESSMENT
89 yo F with PMH of HTN, visual/auditory hallucinations, s/p cholecystectomy, s/p WILL, L inguinal hernia admitted to [facility] for SBO, managed conservatively, NGT removed 11/27 by surgery.

## 2018-11-28 NOTE — PROGRESS NOTE ADULT - PROBLEM SELECTOR PLAN 3
Pt with hx of sundowning, agitation likely 2/2 worsening dementia  - Refused interventions, imaging, etc at nighttime, however is amenable to most interventions during day.   - c/w Haldol 0.5mg IM q6h PRN for agitation.

## 2018-11-28 NOTE — PROGRESS NOTE ADULT - SUBJECTIVE AND OBJECTIVE BOX
Neurology Follow up note    ROD POST89yFemale    HPI:  89 YO F PMHx HTN, visual/auditory hallucinations, s/p cholecystectomy, s/p WILL, L inguinal hernia who presents to ED with chief complaint of abdominal pain and vomiting. Patient states that 2 days ago, she began having abdominal pain. This pain resolved, patient was able to eat and have BMs. Today after eating dinner around 6 pm, patient began having worsening abdominal pain, nausea and vomiting. States she vomited 3-4 times with brownish liquid consistency, NBNB. Patient endorses BM this morning. She denies recent change in stool caliber or consistency, no melena, no hematochezia, no fever/chills. Denies recent travel or change in diet. Of note, patient states she does have issues with chronic constipation and sometimes diarrhea, questionable history of IBS. Patient takes miralax almost daily when constipated. Denies HAs, CP, SOB. Patient mentions that she has L inguinal hernia, has been seen by Dr. Fernandez for it, no intervention to date.     In the ED, vitals T 97.6F, HR 84, /83, RR 19, 97% on RA. CBC normal WBC with left shift 86.4% neutrophils and CMP WNL. UA small ketones, small blood, 3-5 RBCs, few bacteria. CT Abdomen and Pelvis Small bowel obstruction with the transition point in the left mid abdomen near the abdominal wall. Bowel containing left inguinal hernia without strangulation or incarceration. EKG: NSR (unofficial read)    Given IV zofran 4mg, IV tylenol x 1 and 1L NS bolus. (18 Nov 2018 01:05)      Interval History - doing better    Patient is seen, chart was reviewed and case was discussed with the treatment team.  Pt is not in any distress.   Lying on bed comfortably.   No events reported overnight.   No clinical seizure was reported.  Sitting on chair bed comfortably.    is at bedside.    Vital Signs Last 24 Hrs  T(C): 36.6 (27 Nov 2018 14:13), Max: 36.7 (27 Nov 2018 05:19)  T(F): 97.8 (27 Nov 2018 14:13), Max: 98 (27 Nov 2018 05:19)  HR: 86 (27 Nov 2018 14:13) (71 - 86)  BP: 112/67 (27 Nov 2018 14:13) (112/67 - 154/89)  BP(mean): --  RR: 18 (27 Nov 2018 14:13) (18 - 18)  SpO2: 96% (27 Nov 2018 14:13) (96% - 98%))        REVIEW OF SYSTEMS:        On Neurological Examination:    Mental Status - Pt is alert, awake, oriented X1 . Follows commands     Speech -  Normal.    Cranial Nerves - Pupils 3 mm equal and reactive to light, extraocular eye movements intact. Pt has no visual field deficit.  Pt has no  facial asymmetry. Facial sensation is intact.Tongue - is in midline.    Muscle tone - normal,.      Motor Exam - 5/5 .    Sensory Exam -. Pt withdraws all extremities equally on stimulation. No asymmetry seen. No complaints of tingling, numbness.    Gait - Able to stand and walk .      coordination:    Finger to nose: normal    Deep tendon Reflexes - 2 plus all over.          Neck Supple -  Yes.     MEDICATIONS    enoxaparin Injectable 40 milliGRAM(s) SubCutaneous daily  haloperidol    Injectable 0.5 milliGRAM(s) IntraMuscular every 6 hours PRN  metoprolol tartrate Injectable 5 milliGRAM(s) IV Push every 6 hours  ondansetron Injectable 4 milliGRAM(s) IV Push every 6 hours PRN  sodium chloride 0.45% with potassium chloride 20 mEq/L 1000 milliLiter(s) IV Continuous <Continuous>      Allergies    Demerol (Unknown)    Intolerances        LABS:  CBC Full  -  ( 24 Nov 2018 08:39 )  WBC Count : 8.21 K/uL  Hemoglobin : 14.2 g/dL  Hematocrit : 41.0 %  Platelet Count - Automated : 205 K/uL  Mean Cell Volume : 92.1 fl  Mean Cell Hemoglobin : 31.9 pg  Mean Cell Hemoglobin Concentration : 34.6 gm/dL  Auto Neutrophil # : 6.91 K/uL  Auto Lymphocyte # : 0.86 K/uL  Auto Monocyte # : 0.40 K/uL  Auto Eosinophil # : 0.00 K/uL  Auto Basophil # : 0.01 K/uL  Auto Neutrophil % : 84.1 %  Auto Lymphocyte % : 10.5 %  Auto Monocyte % : 4.9 %  Auto Eosinophil % : 0.0 %  Auto Basophil % : 0.1 %      11-24    142  |  103  |  17  ----------------------------<  81  3.4<L>   |  29  |  0.38<L>    Ca    7.6<L>      24 Nov 2018 08:39  Phos  2.7     11-24  Mg     1.8     11-24    TPro  5.8<L>  /  Alb  2.3<L>  /  TBili  1.5<H>  /  DBili  x   /  AST  16  /  ALT  16  /  AlkPhos  37<L>  11-24    Hemoglobin A1C:     Vitamin B12     RADIOLOGY    ASSESSMENT AND PLAN:      ams.  dementia with behavioral disturbance, calmer.  SP SBO.  hypotension.    CONTINUE IVF,  HALDOL PRN FOR AGITATION.  Physical therapy evaluation.  OOB to chair/ambulation with assistance only.  Pain is accessed and addressed.  Plan of care was discussed with family. Questions answered.  Would continue to follow.

## 2018-11-28 NOTE — PROGRESS NOTE ADULT - PROBLEM SELECTOR PLAN 6
- Advance care planning discussed with family. Pt to remain DNR.  Completed MOLST form placed in chart.  - PT recs DC to [facility]  - Discharge planning, hopefully for tomorrow. - Lovenox 40mg SubQ qd for DVT PPx

## 2018-11-28 NOTE — PROGRESS NOTE ADULT - SUBJECTIVE AND OBJECTIVE BOX
HOSPITAL DAY#: 11    SUBJECTIVE:  Patient seen and examined at beside.  Patient with no overnight events.  Patient with no complaints at this time.  NGT removed yesterday, patient now tolerating full liquid diet, unsure if she is passing flatus.  Last BM yesterday was watery.  Patient denies any fever, chills, chest pain, shortness of breath, nausea, or vomiting.    Vital Signs Last 24 Hrs  T(C): 37.1 (28 Nov 2018 05:18), Max: 37.1 (28 Nov 2018 05:18)  T(F): 98.7 (28 Nov 2018 05:18), Max: 98.7 (28 Nov 2018 05:18)  HR: 83 (28 Nov 2018 05:18) (79 - 86)  BP: 160/84 (28 Nov 2018 05:18) (112/67 - 160/84)  BP(mean): --  RR: 17 (28 Nov 2018 05:18) (17 - 18)  SpO2: 99% (28 Nov 2018 05:18) (96% - 99%)    PHYSICAL EXAM:  GENERAL:  Elderly female lying in bed in NAD  HEAD:  Atraumatic, Normocephalic  CHEST/LUNG:  CTAB; No wheezes, rales, or rhonchi  HEART:  Regular rate and rhythm; No murmurs, rubs, or gallops  ABDOMEN:  Soft, non-distended, non-tender to palpation; normal bowel sounds    I&O's Detail    27 Nov 2018 07:01  -  28 Nov 2018 07:00  --------------------------------------------------------  IN:    sodium chloride 0.45% with potassium chloride 20 mEq/L: 390 mL  Total IN: 390 mL    OUT:  Total OUT: 0 mL    Total NET: 390 mL    MEDICATIONS  (STANDING):  docusate sodium 100 milliGRAM(s) Oral three times a day  enoxaparin Injectable 40 milliGRAM(s) SubCutaneous daily  magnesium sulfate  IVPB 2 Gram(s) IV Intermittent once  potassium chloride    Tablet ER 40 milliEquivalent(s) Oral once  sodium chloride 0.45% with potassium chloride 20 mEq/L 1000 milliLiter(s) (30 mL/Hr) IV Continuous <Continuous>    MEDICATIONS  (PRN):  haloperidol    Injectable 0.5 milliGRAM(s) IntraMuscular every 6 hours PRN Agitation  ondansetron Injectable 4 milliGRAM(s) IV Push every 6 hours PRN Nausea and/or Vomiting    LABS:                        14.2   6.90  )-----------( 217      ( 28 Nov 2018 08:55 )             40.6     11-28    137  |  98  |  8   ----------------------------<  99  3.4<L>   |  32<H>  |  0.38<L>    Ca    7.8<L>      28 Nov 2018 08:55  Phos  2.8     11-28  Mg     1.6     11-28    TPro  5.8<L>  /  Alb  2.2<L>  /  TBili  0.5  /  DBili  x   /  AST  22  /  ALT  18  /  AlkPhos  38<L>  11-28    RADIOLOGY & ADDITIONAL STUDIES:  < from: Xray Small Bowel w/Gastro (11.27.18 @ 10:10) >  No evidence of small bowel obstruction  < end of copied text >    ASSESSMENT  89 year old female admitted for SBO, with removal of NGT yesterday and currently tolerating full liquid diet.    PLAN  - Continue to monitor for N/V, advance diet as tolerated  - Cardiology on board for orthostatic hypotension, recommend hold off on BP meds for now  - Ambulation with assistance  - Case to be discussed with Dr. Fernandez

## 2018-11-28 NOTE — PROGRESS NOTE ADULT - PROBLEM SELECTOR PLAN 4
elevated, however -160s acceptable as per cardio  - Patient was receiving IV Metoprolol 5mg q6h in lieu of Cardizem.   - Per cardio, holding Metoprolol and can resume when orthostatics negative.  - Continue to monitor routine hemodynamics.

## 2018-11-28 NOTE — PROGRESS NOTE ADULT - ASSESSMENT
The patient is a 89 year old female with a history of HTN who was admitted with SBO.    Plan:  - Syncope likely due to vasovagal and orthostatic hypotension  - Orthostatics positive on recheck yesterday  - Hold off on antihypertensive medication. Ok to allow -160 in this elderly patient.  - Surgery follow-up

## 2018-11-28 NOTE — PROGRESS NOTE ADULT - PROBLEM SELECTOR PLAN 5
- Lovenox 40mg SubQ qd for DVT PPx Pt noted to have mild erythema and swelling of dorsal aspect of R 5th digit from MCP joint to distal end of finger, without any known trauma or insult  - Xrays negative for f/x, however shows osteopenia  - OsCal 500 +D given  - continue to monitor

## 2018-11-29 LAB
ALBUMIN SERPL ELPH-MCNC: 2.2 G/DL — LOW (ref 3.3–5)
ALP SERPL-CCNC: 37 U/L — LOW (ref 40–120)
ALT FLD-CCNC: 17 U/L — SIGNIFICANT CHANGE UP (ref 12–78)
ANION GAP SERPL CALC-SCNC: 7 MMOL/L — SIGNIFICANT CHANGE UP (ref 5–17)
AST SERPL-CCNC: 17 U/L — SIGNIFICANT CHANGE UP (ref 15–37)
BASOPHILS # BLD AUTO: 0.03 K/UL — SIGNIFICANT CHANGE UP (ref 0–0.2)
BASOPHILS NFR BLD AUTO: 0.5 % — SIGNIFICANT CHANGE UP (ref 0–2)
BILIRUB SERPL-MCNC: 0.4 MG/DL — SIGNIFICANT CHANGE UP (ref 0.2–1.2)
BUN SERPL-MCNC: 10 MG/DL — SIGNIFICANT CHANGE UP (ref 7–23)
CALCIUM SERPL-MCNC: 7.6 MG/DL — LOW (ref 8.5–10.1)
CHLORIDE SERPL-SCNC: 101 MMOL/L — SIGNIFICANT CHANGE UP (ref 96–108)
CO2 SERPL-SCNC: 31 MMOL/L — SIGNIFICANT CHANGE UP (ref 22–31)
CREAT SERPL-MCNC: 0.51 MG/DL — SIGNIFICANT CHANGE UP (ref 0.5–1.3)
EOSINOPHIL # BLD AUTO: 0 K/UL — SIGNIFICANT CHANGE UP (ref 0–0.5)
EOSINOPHIL NFR BLD AUTO: 0 % — SIGNIFICANT CHANGE UP (ref 0–6)
GLUCOSE SERPL-MCNC: 108 MG/DL — HIGH (ref 70–99)
HCT VFR BLD CALC: 39.6 % — SIGNIFICANT CHANGE UP (ref 34.5–45)
HGB BLD-MCNC: 13.6 G/DL — SIGNIFICANT CHANGE UP (ref 11.5–15.5)
IMM GRANULOCYTES NFR BLD AUTO: 1.1 % — SIGNIFICANT CHANGE UP (ref 0–1.5)
LYMPHOCYTES # BLD AUTO: 0.94 K/UL — LOW (ref 1–3.3)
LYMPHOCYTES # BLD AUTO: 14.4 % — SIGNIFICANT CHANGE UP (ref 13–44)
MAGNESIUM SERPL-MCNC: 2 MG/DL — SIGNIFICANT CHANGE UP (ref 1.6–2.6)
MCHC RBC-ENTMCNC: 31.6 PG — SIGNIFICANT CHANGE UP (ref 27–34)
MCHC RBC-ENTMCNC: 34.3 GM/DL — SIGNIFICANT CHANGE UP (ref 32–36)
MCV RBC AUTO: 92.1 FL — SIGNIFICANT CHANGE UP (ref 80–100)
MONOCYTES # BLD AUTO: 0.5 K/UL — SIGNIFICANT CHANGE UP (ref 0–0.9)
MONOCYTES NFR BLD AUTO: 7.7 % — SIGNIFICANT CHANGE UP (ref 2–14)
NEUTROPHILS # BLD AUTO: 4.99 K/UL — SIGNIFICANT CHANGE UP (ref 1.8–7.4)
NEUTROPHILS NFR BLD AUTO: 76.3 % — SIGNIFICANT CHANGE UP (ref 43–77)
PHOSPHATE SERPL-MCNC: 2.4 MG/DL — LOW (ref 2.5–4.5)
PLATELET # BLD AUTO: 266 K/UL — SIGNIFICANT CHANGE UP (ref 150–400)
POTASSIUM SERPL-MCNC: 3.5 MMOL/L — SIGNIFICANT CHANGE UP (ref 3.5–5.3)
POTASSIUM SERPL-SCNC: 3.5 MMOL/L — SIGNIFICANT CHANGE UP (ref 3.5–5.3)
PROT SERPL-MCNC: 6 G/DL — SIGNIFICANT CHANGE UP (ref 6–8.3)
RBC # BLD: 4.3 M/UL — SIGNIFICANT CHANGE UP (ref 3.8–5.2)
RBC # FLD: 12.1 % — SIGNIFICANT CHANGE UP (ref 10.3–14.5)
SODIUM SERPL-SCNC: 139 MMOL/L — SIGNIFICANT CHANGE UP (ref 135–145)
WBC # BLD: 6.53 K/UL — SIGNIFICANT CHANGE UP (ref 3.8–10.5)
WBC # FLD AUTO: 6.53 K/UL — SIGNIFICANT CHANGE UP (ref 3.8–10.5)

## 2018-11-29 RX ORDER — ENOXAPARIN SODIUM 100 MG/ML
40 INJECTION SUBCUTANEOUS
Qty: 0 | Refills: 0 | DISCHARGE
Start: 2018-11-29

## 2018-11-29 RX ORDER — MIDODRINE HYDROCHLORIDE 2.5 MG/1
1 TABLET ORAL
Qty: 0 | Refills: 0 | DISCHARGE
Start: 2018-11-29

## 2018-11-29 RX ORDER — DILTIAZEM HCL 120 MG
0 CAPSULE, EXT RELEASE 24 HR ORAL
Qty: 0 | Refills: 0 | COMMUNITY

## 2018-11-29 RX ORDER — SODIUM,POTASSIUM PHOSPHATES 278-250MG
1 POWDER IN PACKET (EA) ORAL ONCE
Qty: 0 | Refills: 0 | Status: COMPLETED | OUTPATIENT
Start: 2018-11-29 | End: 2018-11-29

## 2018-11-29 RX ORDER — MIDODRINE HYDROCHLORIDE 2.5 MG/1
2.5 TABLET ORAL THREE TIMES A DAY
Qty: 0 | Refills: 0 | Status: DISCONTINUED | OUTPATIENT
Start: 2018-11-29 | End: 2018-11-30

## 2018-11-29 RX ORDER — DOCUSATE SODIUM 100 MG
1 CAPSULE ORAL
Qty: 0 | Refills: 0 | DISCHARGE
Start: 2018-11-29

## 2018-11-29 RX ADMIN — MIDODRINE HYDROCHLORIDE 2.5 MILLIGRAM(S): 2.5 TABLET ORAL at 21:57

## 2018-11-29 RX ADMIN — Medication 1 PACKET(S): at 09:46

## 2018-11-29 RX ADMIN — ENOXAPARIN SODIUM 40 MILLIGRAM(S): 100 INJECTION SUBCUTANEOUS at 11:34

## 2018-11-29 RX ADMIN — MIDODRINE HYDROCHLORIDE 2.5 MILLIGRAM(S): 2.5 TABLET ORAL at 09:46

## 2018-11-29 RX ADMIN — Medication 100 MILLIGRAM(S): at 21:57

## 2018-11-29 RX ADMIN — MIDODRINE HYDROCHLORIDE 2.5 MILLIGRAM(S): 2.5 TABLET ORAL at 13:34

## 2018-11-29 RX ADMIN — Medication 1 TABLET(S): at 11:34

## 2018-11-29 RX ADMIN — Medication 100 MILLIGRAM(S): at 05:01

## 2018-11-29 NOTE — PROGRESS NOTE ADULT - SUBJECTIVE AND OBJECTIVE BOX
Subjective: Pt with episodes of orthostatic hypotension, tolerating a regular diet with normal bowel movements.    Objective:  Vital Signs Last 24 Hrs  T(C): 36.6 (29 Nov 2018 04:59), Max: 36.6 (28 Nov 2018 13:08)  T(F): 97.9 (29 Nov 2018 04:59), Max: 97.9 (29 Nov 2018 04:59)  HR: 80 (29 Nov 2018 04:59) (80 - 95)  BP: 110/71 (29 Nov 2018 04:59) (94/57 - 120/58)  BP(mean): --  RR: 16 (29 Nov 2018 04:59) (16 - 20)  SpO2: 94% (29 Nov 2018 04:59) (94% - 97%)    Heent: BETY CASTANEDA  Pul: clear  Cor: RSR, without murmurs  Abdomen: normal bowel sounds, without distension or tenderness  Extremities: without edema, motor/sensory intact, without calf pain, Homans sign negative.                        13.6   6.53  )-----------( 266      ( 29 Nov 2018 08:49 )             39.6       11-29    139  |  101  |  10  ----------------------------<  108<H>  3.5   |  31  |  0.51    Ca    7.6<L>      29 Nov 2018 08:49  Phos  2.8     11-28  Mg     1.6     11-28    TPro  6.0  /  Alb  2.2<L>  /  TBili  0.4  /  DBili  x   /  AST  17  /  ALT  17  /  AlkPhos  37<L>  11-29 11-28 @ 07:01  -  11-29 @ 07:00  --------------------------------------------------------  IN:    sodium chloride 0.45% with potassium chloride 20 mEq/L: 550 mL  Total IN: 550 mL    OUT:  Total OUT: 0 mL    Total NET: 550 mL

## 2018-11-29 NOTE — PROGRESS NOTE ADULT - PROBLEM SELECTOR PLAN 2
Likely 2/2 vasovagal and orthostatic hypotension  - Cardio (Dr. Mckeon) following after rapid response for near-syncopal event 11/26  - Orthostatics positive yesterday (123 to 92 systolic). Rpt orthostatics positive today (158 to 124 systolic).  - Per cardio, discontinued metoprolol and can resume when orthostatics negative.  midodrine added

## 2018-11-29 NOTE — PROGRESS NOTE ADULT - PROBLEM SELECTOR PROBLEM 1
SBO (small bowel obstruction)

## 2018-11-29 NOTE — PROGRESS NOTE ADULT - SUBJECTIVE AND OBJECTIVE BOX
Patient is a 89y old  Female who presents with a chief complaint of Abdominal pain, vomiting, resolved SBO (29 Nov 2018 09:26)  feels well, tolerating po      INTERVAL HPI/OVERNIGHT EVENTS:  T(C): 36.6 (11-29-18 @ 04:59), Max: 36.6 (11-28-18 @ 13:08)  HR: 80 (11-29-18 @ 04:59) (80 - 95)  BP: 110/71 (11-29-18 @ 04:59) (94/57 - 120/58)  RR: 16 (11-29-18 @ 04:59) (16 - 20)  SpO2: 94% (11-29-18 @ 04:59) (94% - 97%)  Wt(kg): --  I&O's Summary    28 Nov 2018 07:01  -  29 Nov 2018 07:00  --------------------------------------------------------  IN: 550 mL / OUT: 0 mL / NET: 550 mL        LABS:                        13.6   6.53  )-----------( 266      ( 29 Nov 2018 08:49 )             39.6     11-29    139  |  101  |  10  ----------------------------<  108<H>  3.5   |  31  |  0.51    Ca    7.6<L>      29 Nov 2018 08:49  Phos  2.4     11-29  Mg     2.0     11-29    TPro  6.0  /  Alb  2.2<L>  /  TBili  0.4  /  DBili  x   /  AST  17  /  ALT  17  /  AlkPhos  37<L>  11-29        CAPILLARY BLOOD GLUCOSE                MEDICATIONS  (STANDING):  calcium carbonate 1250 mG  + Vitamin D (OsCal 500 + D) 1 Tablet(s) Oral daily  docusate sodium 100 milliGRAM(s) Oral three times a day  enoxaparin Injectable 40 milliGRAM(s) SubCutaneous daily  midodrine 2.5 milliGRAM(s) Oral three times a day  sodium chloride 0.45% with potassium chloride 20 mEq/L 1000 milliLiter(s) (50 mL/Hr) IV Continuous <Continuous>    MEDICATIONS  (PRN):  haloperidol    Injectable 0.5 milliGRAM(s) IntraMuscular every 6 hours PRN Agitation  ondansetron Injectable 4 milliGRAM(s) IV Push every 6 hours PRN Nausea and/or Vomiting      REVIEW OF SYSTEMS:  CONSTITUTIONAL: No fever, weight loss, or fatigue  EYES: No eye pain, visual disturbances, or discharge  ENMT:  No difficulty hearing, tinnitus, vertigo; No sinus or throat pain  NECK: No pain or stiffness  RESPIRATORY: No cough, wheezing, chills or hemoptysis; No shortness of breath  CARDIOVASCULAR: No chest pain, palpitations, dizziness, or leg swelling  GASTROINTESTINAL: No abdominal or epigastric pain. No nausea, vomiting, or hematemesis; No diarrhea or constipation. No melena or hematochezia.  GENITOURINARY: No dysuria, frequency, hematuria, or incontinence  NEUROLOGICAL: No headaches, memory loss, loss of strength, numbness, or tremors  SKIN: No itching, burning, rashes, or lesions   LYMPH NODES: No enlarged glands  ENDOCRINE: No heat or cold intolerance; No hair loss  MUSCULOSKELETAL: No joint pain or swelling; No muscle, back, or extremity pain  PSYCHIATRIC: No depression, anxiety, mood swings, or difficulty sleeping  HEME/LYMPH: No easy bruising, or bleeding gums  ALLERY AND IMMUNOLOGIC: No hives or eczema    RADIOLOGY & ADDITIONAL TESTS:    Imaging Personally Reviewed:  [ ] YES  [ ] NO    Consultant(s) Notes Reviewed:  [ ] YES  [ ] NO    PHYSICAL EXAM:  GENERAL: NAD, well-groomed, well-developed  HEAD:  Atraumatic, Normocephalic  EYES: EOMI, PERRLA, conjunctiva and sclera clear  ENMT: No tonsillar erythema, exudates, or enlargement; Moist mucous membranes, Good dentition, No lesions  NECK: Supple, No JVD, Normal thyroid  NERVOUS SYSTEM:  Alert & Oriented X3, Good concentration; Motor Strength 5/5 B/L upper and lower extremities; DTRs 2+ intact and symmetric  CHEST/LUNG: Clear to percussion bilaterally; No rales, rhonchi, wheezing, or rubs  HEART: Regular rate and rhythm; No murmurs, rubs, or gallops  ABDOMEN: Soft, Nontender, Nondistended; Bowel sounds present  EXTREMITIES:  2+ Peripheral Pulses, No clubbing, cyanosis, or edema  LYMPH: No lymphadenopathy noted  SKIN: No rashes or lesions    Care Discussed with Consultants/Other Providers [ ] YES  [ ] NO

## 2018-11-29 NOTE — PROGRESS NOTE ADULT - PROBLEM SELECTOR PLAN 5
Pt noted to have mild erythema and swelling of dorsal aspect of R 5th digit from MCP joint to distal end of finger, without any known trauma or insult  - Xrays negative for f/x, however shows osteopenia  - OsCal 500 +D given  - continue to monitor

## 2018-11-29 NOTE — PROGRESS NOTE ADULT - ASSESSMENT
The patient is a 89 year old female with a history of HTN who was admitted with SBO.    Plan:  - Syncope likely due to vasovagal and orthostatic hypotension  - Orthostatics remains positive. Recheck again this morning. If continues to be positive, start midodrine 2.5 mg tid.  - Discontinue home diltiazem  - Surgery follow-up  - If patient no longer symptomatic from orthostatic hypotension, ok for discharge from a cardiac perspective

## 2018-11-29 NOTE — PROGRESS NOTE ADULT - PROBLEM SELECTOR PLAN 1
Resolving SBO  - KUB 11/25 showing nonspecific bowel-gas pattern, large amount of stool in colon. No dilated air-filled loops.   - KUB 11/26 showing no definite bowel obstruction.  - Xray small bowel with gastro 11/27 showing no evidence of small bowel obstruction.tolerating po

## 2018-11-29 NOTE — PROGRESS NOTE ADULT - ASSESSMENT
89 yo F with PMH of HTN, visual/auditory hallucinations, s/p cholecystectomy, s/p WILL, L inguinal hernia admitted to [facility] for SBO, managed conservatively, NGT removed 11/27 by surgery.

## 2018-11-29 NOTE — PROGRESS NOTE ADULT - SUBJECTIVE AND OBJECTIVE BOX
Chief Complaint: Abdominal pain, nausea    Interval Events: No events overnight. No complaints.    Review of Systems:  General: No fevers, chills, weight loss or gain  Skin: No rashes, color changes  Cardiovascular: No chest pain, orthopnea  Respiratory: No shortness of breath, cough  Gastrointestinal: No nausea, abdominal pain  Genitourinary: No incontinence, pain with urination  Musculoskeletal: No pain, swelling, decreased range of motion  Neurological: No headache, weakness  Psychiatric: No depression, anxiety  Endocrine: No weight loss or gain, increased thirst  All other systems are comprehensively negative.    Physical Exam:  Vital Signs Last 24 Hrs  T(C): 36.6 (29 Nov 2018 04:59), Max: 36.6 (28 Nov 2018 13:08)  T(F): 97.9 (29 Nov 2018 04:59), Max: 97.9 (29 Nov 2018 04:59)  HR: 80 (29 Nov 2018 04:59) (80 - 95)  BP: 110/71 (29 Nov 2018 04:59) (94/57 - 120/58)  BP(mean): --  RR: 16 (29 Nov 2018 04:59) (16 - 20)  SpO2: 94% (29 Nov 2018 04:59) (94% - 97%)  General: NAD  HEENT: MMM  Neck: No JVD, no carotid bruit  Lungs: CTAB  CV: RRR, nl S1/S2, no M/R/G  Abdomen: S/NT/ND, +BS  Extremities: No LE edema, no cyanosis  Neuro: AAOx3, non-focal  Skin: No rash    Labs:    11-27    138  |  99  |  12  ----------------------------<  66<L>  4.0   |  29  |  0.41<L>    Ca    7.7<L>      27 Nov 2018 08:55  Phos  2.7     11-27  Mg     1.6     11-27    TPro  5.6<L>  /  Alb  2.2<L>  /  TBili  0.5  /  DBili  x   /  AST  17  /  ALT  17  /  AlkPhos  35<L>  11-27                        13.8   7.39  )-----------( 210      ( 27 Nov 2018 08:55 )             40.2       Telemetry: Sinus rhythm

## 2018-11-30 VITALS
RESPIRATION RATE: 16 BRPM | DIASTOLIC BLOOD PRESSURE: 73 MMHG | TEMPERATURE: 98 F | HEART RATE: 97 BPM | SYSTOLIC BLOOD PRESSURE: 111 MMHG | OXYGEN SATURATION: 91 %

## 2018-11-30 LAB
ALBUMIN SERPL ELPH-MCNC: 2.2 G/DL — LOW (ref 3.3–5)
ALP SERPL-CCNC: 38 U/L — LOW (ref 40–120)
ALT FLD-CCNC: 15 U/L — SIGNIFICANT CHANGE UP (ref 12–78)
ANION GAP SERPL CALC-SCNC: 5 MMOL/L — SIGNIFICANT CHANGE UP (ref 5–17)
AST SERPL-CCNC: 20 U/L — SIGNIFICANT CHANGE UP (ref 15–37)
BASOPHILS # BLD AUTO: 0.03 K/UL — SIGNIFICANT CHANGE UP (ref 0–0.2)
BASOPHILS NFR BLD AUTO: 0.4 % — SIGNIFICANT CHANGE UP (ref 0–2)
BILIRUB SERPL-MCNC: 0.4 MG/DL — SIGNIFICANT CHANGE UP (ref 0.2–1.2)
BUN SERPL-MCNC: 10 MG/DL — SIGNIFICANT CHANGE UP (ref 7–23)
CALCIUM SERPL-MCNC: 8 MG/DL — LOW (ref 8.5–10.1)
CHLORIDE SERPL-SCNC: 100 MMOL/L — SIGNIFICANT CHANGE UP (ref 96–108)
CO2 SERPL-SCNC: 34 MMOL/L — HIGH (ref 22–31)
CREAT SERPL-MCNC: 0.49 MG/DL — LOW (ref 0.5–1.3)
EOSINOPHIL # BLD AUTO: 0 K/UL — SIGNIFICANT CHANGE UP (ref 0–0.5)
EOSINOPHIL NFR BLD AUTO: 0 % — SIGNIFICANT CHANGE UP (ref 0–6)
GLUCOSE SERPL-MCNC: 101 MG/DL — HIGH (ref 70–99)
HCT VFR BLD CALC: 40 % — SIGNIFICANT CHANGE UP (ref 34.5–45)
HGB BLD-MCNC: 13.8 G/DL — SIGNIFICANT CHANGE UP (ref 11.5–15.5)
IMM GRANULOCYTES NFR BLD AUTO: 0.8 % — SIGNIFICANT CHANGE UP (ref 0–1.5)
LYMPHOCYTES # BLD AUTO: 1.04 K/UL — SIGNIFICANT CHANGE UP (ref 1–3.3)
LYMPHOCYTES # BLD AUTO: 13.8 % — SIGNIFICANT CHANGE UP (ref 13–44)
MAGNESIUM SERPL-MCNC: 1.8 MG/DL — SIGNIFICANT CHANGE UP (ref 1.6–2.6)
MCHC RBC-ENTMCNC: 31.7 PG — SIGNIFICANT CHANGE UP (ref 27–34)
MCHC RBC-ENTMCNC: 34.5 GM/DL — SIGNIFICANT CHANGE UP (ref 32–36)
MCV RBC AUTO: 91.7 FL — SIGNIFICANT CHANGE UP (ref 80–100)
MONOCYTES # BLD AUTO: 0.53 K/UL — SIGNIFICANT CHANGE UP (ref 0–0.9)
MONOCYTES NFR BLD AUTO: 7 % — SIGNIFICANT CHANGE UP (ref 2–14)
NEUTROPHILS # BLD AUTO: 5.89 K/UL — SIGNIFICANT CHANGE UP (ref 1.8–7.4)
NEUTROPHILS NFR BLD AUTO: 78 % — HIGH (ref 43–77)
PHOSPHATE SERPL-MCNC: 2.8 MG/DL — SIGNIFICANT CHANGE UP (ref 2.5–4.5)
PLATELET # BLD AUTO: 258 K/UL — SIGNIFICANT CHANGE UP (ref 150–400)
POTASSIUM SERPL-MCNC: 3.4 MMOL/L — LOW (ref 3.5–5.3)
POTASSIUM SERPL-SCNC: 3.4 MMOL/L — LOW (ref 3.5–5.3)
PROT SERPL-MCNC: 6.1 G/DL — SIGNIFICANT CHANGE UP (ref 6–8.3)
RBC # BLD: 4.36 M/UL — SIGNIFICANT CHANGE UP (ref 3.8–5.2)
RBC # FLD: 12.4 % — SIGNIFICANT CHANGE UP (ref 10.3–14.5)
SODIUM SERPL-SCNC: 139 MMOL/L — SIGNIFICANT CHANGE UP (ref 135–145)
WBC # BLD: 7.55 K/UL — SIGNIFICANT CHANGE UP (ref 3.8–10.5)
WBC # FLD AUTO: 7.55 K/UL — SIGNIFICANT CHANGE UP (ref 3.8–10.5)

## 2018-11-30 PROCEDURE — 96375 TX/PRO/DX INJ NEW DRUG ADDON: CPT

## 2018-11-30 PROCEDURE — 80053 COMPREHEN METABOLIC PANEL: CPT

## 2018-11-30 PROCEDURE — 80048 BASIC METABOLIC PNL TOTAL CA: CPT

## 2018-11-30 PROCEDURE — 82150 ASSAY OF AMYLASE: CPT

## 2018-11-30 PROCEDURE — 84484 ASSAY OF TROPONIN QUANT: CPT

## 2018-11-30 PROCEDURE — 86850 RBC ANTIBODY SCREEN: CPT

## 2018-11-30 PROCEDURE — 71045 X-RAY EXAM CHEST 1 VIEW: CPT

## 2018-11-30 PROCEDURE — 83690 ASSAY OF LIPASE: CPT

## 2018-11-30 PROCEDURE — 85610 PROTHROMBIN TIME: CPT

## 2018-11-30 PROCEDURE — 97161 PT EVAL LOW COMPLEX 20 MIN: CPT

## 2018-11-30 PROCEDURE — 97116 GAIT TRAINING THERAPY: CPT

## 2018-11-30 PROCEDURE — 93005 ELECTROCARDIOGRAM TRACING: CPT

## 2018-11-30 PROCEDURE — 74177 CT ABD & PELVIS W/CONTRAST: CPT

## 2018-11-30 PROCEDURE — 87086 URINE CULTURE/COLONY COUNT: CPT

## 2018-11-30 PROCEDURE — 73130 X-RAY EXAM OF HAND: CPT

## 2018-11-30 PROCEDURE — 85027 COMPLETE CBC AUTOMATED: CPT

## 2018-11-30 PROCEDURE — 74250 X-RAY XM SM INT 1CNTRST STD: CPT

## 2018-11-30 PROCEDURE — 83615 LACTATE (LD) (LDH) ENZYME: CPT

## 2018-11-30 PROCEDURE — 36415 COLL VENOUS BLD VENIPUNCTURE: CPT

## 2018-11-30 PROCEDURE — 86901 BLOOD TYPING SEROLOGIC RH(D): CPT

## 2018-11-30 PROCEDURE — 83605 ASSAY OF LACTIC ACID: CPT

## 2018-11-30 PROCEDURE — 86900 BLOOD TYPING SEROLOGIC ABO: CPT

## 2018-11-30 PROCEDURE — 97530 THERAPEUTIC ACTIVITIES: CPT

## 2018-11-30 PROCEDURE — 74019 RADEX ABDOMEN 2 VIEWS: CPT

## 2018-11-30 PROCEDURE — 81001 URINALYSIS AUTO W/SCOPE: CPT

## 2018-11-30 PROCEDURE — 74018 RADEX ABDOMEN 1 VIEW: CPT

## 2018-11-30 PROCEDURE — 83735 ASSAY OF MAGNESIUM: CPT

## 2018-11-30 PROCEDURE — 84100 ASSAY OF PHOSPHORUS: CPT

## 2018-11-30 PROCEDURE — 96365 THER/PROPH/DIAG IV INF INIT: CPT

## 2018-11-30 PROCEDURE — 85730 THROMBOPLASTIN TIME PARTIAL: CPT

## 2018-11-30 PROCEDURE — 99285 EMERGENCY DEPT VISIT HI MDM: CPT | Mod: 25

## 2018-11-30 PROCEDURE — 82962 GLUCOSE BLOOD TEST: CPT

## 2018-11-30 RX ORDER — POTASSIUM CHLORIDE 20 MEQ
40 PACKET (EA) ORAL ONCE
Qty: 0 | Refills: 0 | Status: COMPLETED | OUTPATIENT
Start: 2018-11-30 | End: 2018-11-30

## 2018-11-30 RX ADMIN — MIDODRINE HYDROCHLORIDE 2.5 MILLIGRAM(S): 2.5 TABLET ORAL at 05:34

## 2018-11-30 RX ADMIN — Medication 100 MILLIGRAM(S): at 05:34

## 2018-11-30 RX ADMIN — ENOXAPARIN SODIUM 40 MILLIGRAM(S): 100 INJECTION SUBCUTANEOUS at 12:31

## 2018-11-30 RX ADMIN — MIDODRINE HYDROCHLORIDE 2.5 MILLIGRAM(S): 2.5 TABLET ORAL at 14:25

## 2018-11-30 RX ADMIN — Medication 40 MILLIEQUIVALENT(S): at 11:05

## 2018-11-30 NOTE — PROGRESS NOTE ADULT - REASON FOR ADMISSION
Abdominal pain, vomiting
Abdominal pain, vomiting, SBO
Abdominal pain, vomiting, SBO
Abdominal pain, vomiting, resolved SBO
Abdominal pain, vomiting

## 2018-11-30 NOTE — PROGRESS NOTE ADULT - ASSESSMENT
The patient is a 89 year old female with a history of HTN who was admitted with SBO.    Plan:  - Started midodrine 2.5 mg tid.  - Orthostatics negative yesterday  - Likely can discontinue midodrine on outpatient follow-up  - Would not resume home diltiazem  - Surgery follow-up  - Discharge planning

## 2018-11-30 NOTE — PROGRESS NOTE ADULT - PROVIDER SPECIALTY LIST ADULT
Cardiology
Hospitalist
Internal Medicine
Neurology
Surgery
Cardiology
Neurology
Internal Medicine

## 2018-11-30 NOTE — PROGRESS NOTE ADULT - SUBJECTIVE AND OBJECTIVE BOX
Chief Complaint: Abdominal pain, nausea    Interval Events: No events overnight. Sleeping.    Review of Systems:  General: No fevers, chills, weight loss or gain  Skin: No rashes, color changes  Cardiovascular: No chest pain, orthopnea  Respiratory: No shortness of breath, cough  Gastrointestinal: No nausea, abdominal pain  Genitourinary: No incontinence, pain with urination  Musculoskeletal: No pain, swelling, decreased range of motion  Neurological: No headache, weakness  Psychiatric: No depression, anxiety  Endocrine: No weight loss or gain, increased thirst  All other systems are comprehensively negative.    Physical Exam:  Vital Signs Last 24 Hrs  T(C): 36.6 (30 Nov 2018 05:12), Max: 36.6 (29 Nov 2018 13:31)  T(F): 97.8 (30 Nov 2018 05:12), Max: 97.9 (29 Nov 2018 13:31)  HR: 84 (30 Nov 2018 05:12) (84 - 85)  BP: 127/74 (30 Nov 2018 05:12) (95/60 - 127/74)  BP(mean): --  RR: 16 (30 Nov 2018 05:12) (16 - 18)  SpO2: 92% (30 Nov 2018 05:12) (92% - 97%)  General: NAD  HEENT: MMM  Neck: No JVD, no carotid bruit  Lungs: CTAB  CV: RRR, nl S1/S2, no M/R/G  Abdomen: S/NT/ND, +BS  Extremities: No LE edema, no cyanosis  Neuro: AAOx3, non-focal  Skin: No rash    Labs:    11-29    139  |  101  |  10  ----------------------------<  108<H>  3.5   |  31  |  0.51    Ca    7.6<L>      29 Nov 2018 08:49  Phos  2.4     11-29  Mg     2.0     11-29    TPro  6.0  /  Alb  2.2<L>  /  TBili  0.4  /  DBili  x   /  AST  17  /  ALT  17  /  AlkPhos  37<L>  11-29                        13.6   6.53  )-----------( 266      ( 29 Nov 2018 08:49 )             39.6

## 2018-11-30 NOTE — PROGRESS NOTE ADULT - SUBJECTIVE AND OBJECTIVE BOX
Neurology Follow up note    ROD POST89yFemale    HPI:  89 YO F PMHx HTN, visual/auditory hallucinations, s/p cholecystectomy, s/p WILL, L inguinal hernia who presents to ED with chief complaint of abdominal pain and vomiting. Patient states that 2 days ago, she began having abdominal pain. This pain resolved, patient was able to eat and have BMs. Today after eating dinner around 6 pm, patient began having worsening abdominal pain, nausea and vomiting. States she vomited 3-4 times with brownish liquid consistency, NBNB. Patient endorses BM this morning. She denies recent change in stool caliber or consistency, no melena, no hematochezia, no fever/chills. Denies recent travel or change in diet. Of note, patient states she does have issues with chronic constipation and sometimes diarrhea, questionable history of IBS. Patient takes miralax almost daily when constipated. Denies HAs, CP, SOB. Patient mentions that she has L inguinal hernia, has been seen by Dr. Fernandez for it, no intervention to date.     In the ED, vitals T 97.6F, HR 84, /83, RR 19, 97% on RA. CBC normal WBC with left shift 86.4% neutrophils and CMP WNL. UA small ketones, small blood, 3-5 RBCs, few bacteria. CT Abdomen and Pelvis Small bowel obstruction with the transition point in the left mid abdomen near the abdominal wall. Bowel containing left inguinal hernia without strangulation or incarceration. EKG: NSR (unofficial read)    Given IV zofran 4mg, IV tylenol x 1 and 1L NS bolus. (18 Nov 2018 01:05)      Interval History - doing better    Patient is seen, chart was reviewed and case was discussed with the treatment team.  Pt is not in any distress.   Lying on bed comfortably.   No events reported overnight.   No clinical seizure was reported.  Sitting on chair bed comfortably.    is at bedside.    Vital Signs Last 24 Hrs  T(C): 36.6 (30 Nov 2018 05:12), Max: 36.6 (29 Nov 2018 13:31)  T(F): 97.8 (30 Nov 2018 05:12), Max: 97.9 (29 Nov 2018 13:31)  HR: 84 (30 Nov 2018 05:12) (84 - 85)  BP: 127/74 (30 Nov 2018 05:12) (95/60 - 127/74)  BP(mean): --  RR: 16 (30 Nov 2018 05:12) (16 - 18)  SpO2: 92% (30 Nov 2018 05:12) (92% - 97%)        REVIEW OF SYSTEMS:    Constitutional: No fever, weight loss or fatigue  Eyes: No eye pain, visual disturbances, or discharge  ENT:  No difficulty hearing, tinnitus, vertigo; No sinus or throat pain  Neck: No pain or stiffness  Respiratory: No cough, wheezing, chills or hemoptysis  Cardiovascular: No chest pain, palpitations, short  Gastrointestinal: NoNo melena or hematochezia.  Genitourinary: No dysuria, frequency, hematuria   Neurological: No headaches,   Psychiatric: No depression,   Musculoskeletal: No joint pain or swelling;  Skin: No itching, burning, rashes or lesions   Lymph Nodes: No enlarged glands  Endocrine: No heat or cold intolerance; No hair loss, No h/o diabetes or thyroid dysfunction  Allergy and Immunologic: No hives or eczema    On Neurological Examination:    Mental Status - Pt is alert, awake, oriented X2 . Follows commands well and able to answer questions appropriately  .Mood and affect  normal    Speech -  Normal.     Cranial Nerves - Pupils 3 mm equal and reactive to light, extraocular eye movements intact. Pt has no visual field deficit.  Pt has no  facial asymmetry. Facial sensation is intact.Tongue - is in midline.    Muscle tone - is normal all over. Moves all extremities equally. No asymmetry is seen.      Motor Exam - 5/5 of UE.  LE 4/5.    Sensory Exam -. Pt withdraws all extremities equally on stimulation. No asymmetry seen. No complaints of tingling, numbness.      coordination:    Finger to nose: normal      Deep tendon Reflexes - 2 plus all over.         Neck Supple -  Yes.     MEDICATIONS    calcium carbonate 1250 mG  + Vitamin D (OsCal 500 + D) 1 Tablet(s) Oral daily  docusate sodium 100 milliGRAM(s) Oral three times a day  enoxaparin Injectable 40 milliGRAM(s) SubCutaneous daily  haloperidol    Injectable 0.5 milliGRAM(s) IntraMuscular every 6 hours PRN  midodrine 2.5 milliGRAM(s) Oral three times a day  ondansetron Injectable 4 milliGRAM(s) IV Push every 6 hours PRN      Allergies    Demerol (Unknown)    Intolerances        LABS:  CBC Full  -  ( 30 Nov 2018 08:18 )  WBC Count : 7.55 K/uL  Hemoglobin : 13.8 g/dL  Hematocrit : 40.0 %  Platelet Count - Automated : 258 K/uL  Mean Cell Volume : 91.7 fl  Mean Cell Hemoglobin : 31.7 pg  Mean Cell Hemoglobin Concentration : 34.5 gm/dL  Auto Neutrophil # : 5.89 K/uL  Auto Lymphocyte # : 1.04 K/uL  Auto Monocyte # : 0.53 K/uL  Auto Eosinophil # : 0.00 K/uL  Auto Basophil # : 0.03 K/uL  Auto Neutrophil % : 78.0 %  Auto Lymphocyte % : 13.8 %  Auto Monocyte % : 7.0 %  Auto Eosinophil % : 0.0 %  Auto Basophil % : 0.4 %      11-30    139  |  100  |  10  ----------------------------<  101<H>  3.4<L>   |  34<H>  |  0.49<L>    Ca    8.0<L>      30 Nov 2018 08:18  Phos  2.8     11-30  Mg     1.8     11-30    TPro  6.1  /  Alb  2.2<L>  /  TBili  0.4  /  DBili  x   /  AST  20  /  ALT  15  /  AlkPhos  38<L>  11-30    Hemoglobin A1C:     Vitamin B12     RADIOLOGY    ASSESSMENT AND PLAN:      ADMITTED FOR SMALL BOWEL OBSTRUCTION.  AMS IMPROVED,  UNDERLYING DEMENTIA,    NEUR[facility],  NEURO FOLLOW UP AS OUT PATIENT,PROVIDED CONTACT NUMBER TO DAUGHTER,  Physical therapy evaluation.  Pain is accessed and addressed.  Plan of care was discussed with family. Questions answered.  Would continue to follow.

## 2020-12-02 ENCOUNTER — EMERGENCY (EMERGENCY)
Facility: HOSPITAL | Age: 85
LOS: 1 days | Discharge: HOME | End: 2020-12-02
Attending: EMERGENCY MEDICINE | Admitting: EMERGENCY MEDICINE
Payer: MEDICARE

## 2020-12-02 VITALS
SYSTOLIC BLOOD PRESSURE: 164 MMHG | DIASTOLIC BLOOD PRESSURE: 90 MMHG | OXYGEN SATURATION: 98 % | TEMPERATURE: 98 F | RESPIRATION RATE: 16 BRPM | HEART RATE: 88 BPM

## 2020-12-02 VITALS
HEART RATE: 85 BPM | TEMPERATURE: 98 F | WEIGHT: 115.08 LBS | OXYGEN SATURATION: 97 % | RESPIRATION RATE: 16 BRPM | HEIGHT: 60 IN | DIASTOLIC BLOOD PRESSURE: 79 MMHG | SYSTOLIC BLOOD PRESSURE: 171 MMHG

## 2020-12-02 DIAGNOSIS — Z90.49 ACQUIRED ABSENCE OF OTHER SPECIFIED PARTS OF DIGESTIVE TRACT: Chronic | ICD-10-CM

## 2020-12-02 DIAGNOSIS — Z90.710 ACQUIRED ABSENCE OF BOTH CERVIX AND UTERUS: Chronic | ICD-10-CM

## 2020-12-02 DIAGNOSIS — Z86.69 PERSONAL HISTORY OF OTHER DISEASES OF THE NERVOUS SYSTEM AND SENSE ORGANS: Chronic | ICD-10-CM

## 2020-12-02 LAB
ALBUMIN SERPL ELPH-MCNC: 3 G/DL — LOW (ref 3.3–5)
ALP SERPL-CCNC: 48 U/L — SIGNIFICANT CHANGE UP (ref 40–120)
ALT FLD-CCNC: 16 U/L — SIGNIFICANT CHANGE UP (ref 12–78)
ANION GAP SERPL CALC-SCNC: 6 MMOL/L — SIGNIFICANT CHANGE UP (ref 5–17)
APTT BLD: 33.7 SEC — SIGNIFICANT CHANGE UP (ref 27.5–35.5)
AST SERPL-CCNC: 20 U/L — SIGNIFICANT CHANGE UP (ref 15–37)
BASOPHILS # BLD AUTO: 0.02 K/UL — SIGNIFICANT CHANGE UP (ref 0–0.2)
BASOPHILS NFR BLD AUTO: 0.3 % — SIGNIFICANT CHANGE UP (ref 0–2)
BILIRUB SERPL-MCNC: 0.5 MG/DL — SIGNIFICANT CHANGE UP (ref 0.2–1.2)
BUN SERPL-MCNC: 17 MG/DL — SIGNIFICANT CHANGE UP (ref 7–23)
CALCIUM SERPL-MCNC: 8.7 MG/DL — SIGNIFICANT CHANGE UP (ref 8.5–10.1)
CHLORIDE SERPL-SCNC: 107 MMOL/L — SIGNIFICANT CHANGE UP (ref 96–108)
CO2 SERPL-SCNC: 29 MMOL/L — SIGNIFICANT CHANGE UP (ref 22–31)
CREAT SERPL-MCNC: 0.7 MG/DL — SIGNIFICANT CHANGE UP (ref 0.5–1.3)
EOSINOPHIL # BLD AUTO: 0 K/UL — SIGNIFICANT CHANGE UP (ref 0–0.5)
EOSINOPHIL NFR BLD AUTO: 0 % — SIGNIFICANT CHANGE UP (ref 0–6)
GLUCOSE SERPL-MCNC: 96 MG/DL — SIGNIFICANT CHANGE UP (ref 70–99)
HCT VFR BLD CALC: 40.4 % — SIGNIFICANT CHANGE UP (ref 34.5–45)
HGB BLD-MCNC: 13.8 G/DL — SIGNIFICANT CHANGE UP (ref 11.5–15.5)
IMM GRANULOCYTES NFR BLD AUTO: 0.2 % — SIGNIFICANT CHANGE UP (ref 0–1.5)
INR BLD: 1.12 RATIO — SIGNIFICANT CHANGE UP (ref 0.88–1.16)
LACTATE SERPL-SCNC: 0.9 MMOL/L — SIGNIFICANT CHANGE UP (ref 0.7–2)
LYMPHOCYTES # BLD AUTO: 1.38 K/UL — SIGNIFICANT CHANGE UP (ref 1–3.3)
LYMPHOCYTES # BLD AUTO: 22.3 % — SIGNIFICANT CHANGE UP (ref 13–44)
MCHC RBC-ENTMCNC: 32.5 PG — SIGNIFICANT CHANGE UP (ref 27–34)
MCHC RBC-ENTMCNC: 34.2 GM/DL — SIGNIFICANT CHANGE UP (ref 32–36)
MCV RBC AUTO: 95.1 FL — SIGNIFICANT CHANGE UP (ref 80–100)
MONOCYTES # BLD AUTO: 0.37 K/UL — SIGNIFICANT CHANGE UP (ref 0–0.9)
MONOCYTES NFR BLD AUTO: 6 % — SIGNIFICANT CHANGE UP (ref 2–14)
NEUTROPHILS # BLD AUTO: 4.41 K/UL — SIGNIFICANT CHANGE UP (ref 1.8–7.4)
NEUTROPHILS NFR BLD AUTO: 71.2 % — SIGNIFICANT CHANGE UP (ref 43–77)
NRBC # BLD: 0 /100 WBCS — SIGNIFICANT CHANGE UP (ref 0–0)
PLATELET # BLD AUTO: 172 K/UL — SIGNIFICANT CHANGE UP (ref 150–400)
POTASSIUM SERPL-MCNC: 4.3 MMOL/L — SIGNIFICANT CHANGE UP (ref 3.5–5.3)
POTASSIUM SERPL-SCNC: 4.3 MMOL/L — SIGNIFICANT CHANGE UP (ref 3.5–5.3)
PROT SERPL-MCNC: 7.4 G/DL — SIGNIFICANT CHANGE UP (ref 6–8.3)
PROTHROM AB SERPL-ACNC: 13 SEC — SIGNIFICANT CHANGE UP (ref 10.6–13.6)
RBC # BLD: 4.25 M/UL — SIGNIFICANT CHANGE UP (ref 3.8–5.2)
RBC # FLD: 12.8 % — SIGNIFICANT CHANGE UP (ref 10.3–14.5)
SODIUM SERPL-SCNC: 142 MMOL/L — SIGNIFICANT CHANGE UP (ref 135–145)
WBC # BLD: 6.19 K/UL — SIGNIFICANT CHANGE UP (ref 3.8–10.5)
WBC # FLD AUTO: 6.19 K/UL — SIGNIFICANT CHANGE UP (ref 3.8–10.5)

## 2020-12-02 PROCEDURE — 99285 EMERGENCY DEPT VISIT HI MDM: CPT

## 2020-12-02 PROCEDURE — 93971 EXTREMITY STUDY: CPT

## 2020-12-02 PROCEDURE — 93971 EXTREMITY STUDY: CPT | Mod: 26,LT

## 2020-12-02 PROCEDURE — 85610 PROTHROMBIN TIME: CPT

## 2020-12-02 PROCEDURE — 96374 THER/PROPH/DIAG INJ IV PUSH: CPT

## 2020-12-02 PROCEDURE — 87040 BLOOD CULTURE FOR BACTERIA: CPT

## 2020-12-02 PROCEDURE — 85730 THROMBOPLASTIN TIME PARTIAL: CPT

## 2020-12-02 PROCEDURE — 36415 COLL VENOUS BLD VENIPUNCTURE: CPT

## 2020-12-02 PROCEDURE — 99284 EMERGENCY DEPT VISIT MOD MDM: CPT | Mod: 25

## 2020-12-02 PROCEDURE — 83605 ASSAY OF LACTIC ACID: CPT

## 2020-12-02 PROCEDURE — 80053 COMPREHEN METABOLIC PANEL: CPT

## 2020-12-02 PROCEDURE — 85025 COMPLETE CBC W/AUTO DIFF WBC: CPT

## 2020-12-02 RX ORDER — PIPERACILLIN AND TAZOBACTAM 4; .5 G/20ML; G/20ML
3.38 INJECTION, POWDER, LYOPHILIZED, FOR SOLUTION INTRAVENOUS ONCE
Refills: 0 | Status: COMPLETED | OUTPATIENT
Start: 2020-12-02 | End: 2020-12-02

## 2020-12-02 RX ADMIN — PIPERACILLIN AND TAZOBACTAM 200 GRAM(S): 4; .5 INJECTION, POWDER, LYOPHILIZED, FOR SOLUTION INTRAVENOUS at 16:55

## 2020-12-02 NOTE — ED ADULT NURSE NOTE - CHPI ED NUR SYMPTOMS NEG
no vomiting/no blood in mucus/no pain/no bleeding at site/no purulent drainage/no drainage/no fever/no chills

## 2020-12-02 NOTE — ED PROVIDER NOTE - PROGRESS NOTE DETAILS
patient feeling well, does not want to stay in the hospital, to f/u as outpatient, recommend patient restart lasix, understands to return to ER for worsneing of symptoms, increased redness, fever

## 2020-12-02 NOTE — ED PROVIDER NOTE - CARE PROVIDER_API CALL
Hernandez Saba  CARDIOVASCULAR DISEASE  [address], [address]  [place] [address]  Phone: [phone]  Fax: [phone]  Follow Up Time:

## 2020-12-02 NOTE — ED ADULT NURSE NOTE - OBJECTIVE STATEMENT
patient came in ED from home with c/o left lower leg swelling and stasis ulcer X 2.5 weeks. Daughter at the bedside stated patient completed PO antibiotic, no relief. afebrile. alert and oriented X 1 (person). non-labored respiration noted. abdomen noted with hernia but non-tender. left lower leg noted swollen with discoloration. and open blister. on fall precaution.

## 2020-12-02 NOTE — ED PROVIDER NOTE - NSFOLLOWUPINSTRUCTIONS_ED_ALL_ED_FT
WHAT YOU NEED TO KNOW:    Cellulitis is a skin infection caused by bacteria. Cellulitis may go away on its own or you may need treatment. Your healthcare provider may draw a [place] around the outside edges of your cellulitis. If your cellulitis spreads, your healthcare provider will see it outside of the [place].     Cellulitis          DISCHARGE INSTRUCTIONS:    Call 911 if:   •You have sudden trouble breathing or chest pain.          Return to the emergency department if:   •Your wound gets larger and more painful.       •You feel a crackling under your skin when you touch it.      •You have purple dots or bumps on your skin, or you see bleeding under your skin.      •You have new swelling and pain in your legs.      •The red, warm, swollen area gets larger.      •You see red streaks coming from the infected area.      Contact your healthcare provider if:   •You have a fever.      •Your fever or pain does not go away or gets worse.      •The area does not get smaller after 2 days of antibiotics.      •Your skin is flaking or peeling off.      •You have questions or concerns about your condition or care.      Medicines:   •Antibiotics help treat the bacterial infection.       •NSAIDs, such as ibuprofen, help decrease swelling, pain, and fever. NSAIDs can cause stomach bleeding or kidney problems in certain people. If you take blood thinner medicine, always ask if NSAIDs are safe for you. Always read the medicine label and follow directions. Do not give these medicines to children under 6 months of age without direction from your child's healthcare provider.      •Acetaminophen decreases pain and fever. It is available without a doctor's order. Ask how much to take and how often to take it. Follow directions. Read the labels of all other medicines you are using to see if they also contain acetaminophen, or ask your doctor or pharmacist. Acetaminophen can cause liver damage if not taken correctly. Do not use more than 4 grams (4,000 milligrams) total of acetaminophen in one day.       •Take your medicine as directed. Contact your healthcare provider if you think your medicine is not helping or if you have side effects. Tell him or her if you are allergic to any medicine. Keep a list of the medicines, vitamins, and herbs you take. Include the amounts, and when and why you take them. Bring the list or the pill bottles to follow-up visits. Carry your medicine list with you in case of an emergency.      Self-care:   •Elevate the area above the level of your heart as often as you can. This will help decrease swelling and pain. Prop the area on pillows or blankets to keep it elevated comfortably.       •Clean the area daily until the wound scabs over. Gently wash the area with soap and water. Pat dry. Use dressings as directed.       •Place cool or warm, wet cloths on the area as directed. Use clean cloths and clean water. Leave it on the area until the cloth is room temperature. Pat the area dry with a clean, dry cloth. The cloths may help decrease pain.       Prevent cellulitis:   •Do not scratch bug bites or areas of injury. You increase your risk for cellulitis by scratching these areas.       •Do not share personal items, such as towels, clothing, and razors.       •Clean exercise equipment with germ-killing  before and after you use it.      •Wash your hands often. Use soap and water. Wash your hands after you use the bathroom, change a child's diapers, or sneeze. Wash your hands before you prepare or eat food. Use lotion to prevent dry, cracked skin.   Handwashing           •Wear pressure stockings as directed. You may be told to wear the stockings if you have peripheral edema. The stockings improve blood flow and decrease swelling.      •Treat athlete’s foot. This can help prevent the spread of a bacterial skin infection.      Follow up with your healthcare provider within 3 days, or as directed: Your healthcare provider will check if your cellulitis is getting better. You may need different medicine. Write down your questions so you remember to ask them during your visits.       © Copyright [employer] 2020           back to top                          © Copyright [employer] 2020

## 2020-12-02 NOTE — ED PROVIDER NOTE - OBJECTIVE STATEMENT
89 female sent to ER from cardiology office Dr. Saba for antibiotics regarding cellulitis of left leg. Patient has a nurse 89 female sent to ER from cardiology office Dr. Saba for antibiotics regarding cellulitis of left leg. Patient has a nurse who visits the house and changes dressing of lower extremity wound, concern for increased redness, cellulitis and edema. Daughter states she stopped taking her lasix 2 weeks ago because of frequent urination.

## 2020-12-02 NOTE — ED PROVIDER NOTE - PATIENT PORTAL LINK FT
You can access the FollowMyHealth Patient Portal offered by [facility] by registering at the following website: [email or web address]. By joining [facility]’s FollowMyHealth portal, you will also be able to view your health information using other applications (apps) compatible with our system.

## 2020-12-02 NOTE — ED ADULT NURSE NOTE - NSIMPLEMENTINTERV_GEN_ALL_ED
Implemented All Fall with Harm Risk Interventions:  [place] to call system. Call bell, personal items and telephone within reach. Instruct patient to call for assistance. Room bathroom lighting operational. Non-slip footwear when patient is off stretcher. Physically safe environment: no spills, clutter or unnecessary equipment. Stretcher in lowest position, wheels locked, appropriate side rails in place. Provide visual cue, wrist band, yellow gown, etc. Monitor gait and stability. Monitor for mental status changes and reorient to person, place, and time. Review medications for side effects contributing to fall risk. Reinforce activity limits and safety measures with patient and family. Provide visual clues: red socks.

## 2020-12-03 PROBLEM — R44.3 HALLUCINATIONS, UNSPECIFIED: Chronic | Status: ACTIVE | Noted: 2018-11-18

## 2020-12-03 PROBLEM — I10 ESSENTIAL (PRIMARY) HYPERTENSION: Chronic | Status: ACTIVE | Noted: 2018-11-17

## 2020-12-03 PROBLEM — K40.90 UNILATERAL INGUINAL HERNIA, WITHOUT OBSTRUCTION OR GANGRENE, NOT SPECIFIED AS RECURRENT: Chronic | Status: ACTIVE | Noted: 2018-11-18

## 2020-12-07 LAB
CULTURE RESULTS: SIGNIFICANT CHANGE UP
CULTURE RESULTS: SIGNIFICANT CHANGE UP
SPECIMEN SOURCE: SIGNIFICANT CHANGE UP
SPECIMEN SOURCE: SIGNIFICANT CHANGE UP

## 2021-04-04 ENCOUNTER — TRANSCRIPTION ENCOUNTER (OUTPATIENT)
Age: 86
End: 2021-04-04

## 2022-06-10 NOTE — PROGRESS NOTE ADULT - ASSESSMENT
[address] [facility]  Progress Note - Nic Grimm Sr  1947, 76 y o  male MRN: [id number]  Unit/Bed#: [facility] 823-01 Encounter: [id number]  Primary Care Provider: Edmundo Fuller MD   Date and time admitted to hospital: 6/9/2022  5:27 PM    * Shortness of breath  Assessment & Plan  Presented with worsening SOB x few weeks along with nonproductive cough  · CXR negative for acute disease, does show post lobectomy changes, R apex pulmonary scarring and persistent R basilar opacity likely reflecting combination of pleural fluid and atelectasis  · COVID/Flu/RSV negative  · Possible COPD exacerbation--see below  · Typically on 2L via NC at baseline  · Will not repeat echo as recently had one    Adenocarcinoma of lung [facility])  [address] with radiation oncology as outpatient, most recent office note reviewed from 9/2021  · Recent CTA chest 3/29/22 without acute findings in chest  · Continue outpatient f/u      CAD (coronary artery disease)  Assessment & Plan  history of CAD with prior stenting (D2 and RCA x2 prior to 2015)  Has had recurrent chest pain for weeks--months   Was recently seen by cardiology during admission early May 2022 and had negative nuclear stress test  Recommended for outpatient f/u  · Continue aspirin carvedilol statin    COPD, severe ([address] )  Assessment & Plan  FEV1/FVC 41%, FEV1 33%, on baseline 2-3L  · With suspected acute exacerbation, started on IV steroids  · Continue for now  · Consult pulmonology  · Continue nebs, inhalers    CKD (chronic kidney disease) stage 3, GFR 30-59 ml/min [facility])  Assessment & Plan  Lab Results   Component Value Date    EGFR 55 06/10/2022    EGFR 56 06/09/2022    EGFR 42 05/21/2022    CREATININE 1 26 06/10/2022    CREATININE 1 24 06/09/2022    CREATININE 1 57 (H) 05/21/2022   Baseline appears around 1 2-1 4; creat stable at baseline for now  · Avoid hypotension  · Continues on home dose lasix 40 mg daily  · monitor BMP    Chronic respiratory failure with hypoxia (HCC)  Assessment & Plan  · On baseline 2-3L NC     Chronic diastolic heart failure (HCC)  Assessment & Plan  Wt Readings from Last 3 Encounters:   06/09/22 99 8 kg (220 lb)   05/17/22 98 9 kg (218 lb)   05/08/22 103 kg (226 lb)     Recent echo reviewed, appears euvolemic  · Continue home dose lasix        Venous stasis dermatitis of both lower extremities  Assessment & Plan  · Keep extremities elevated    Type 2 diabetes mellitus with hyperglycemia, with long-term current use of insulin [facility])  Assessment & Plan  Lab Results   Component Value Date    HGBA1C 9 2 (H) 04/22/2022       Recent Labs     06/10/22  0609 06/10/22  0709 06/10/22  0828 06/10/22  1010   POCGLU 250* 226* 221* 323*       Blood Sugar Average: Last 72 hrs:  (P) 463 [id number]   · A1C above goal, glucose markedly elevated 2/2 IV steroids  · Placed on insulin gtt, continue for now and consult endocrinology   · Home regimen: concentrated insulin 45 units TIDWM    HLD (hyperlipidemia)  Assessment & Plan  · Continue atorvastatin        VTE Pharmacologic Prophylaxis: VTE Score: 4 Moderate Risk (Score 3-4) - Pharmacological DVT Prophylaxis Ordered: enoxaparin (Lovenox)  Patient Centered Rounds: I performed bedside rounds with nursing staff today  Discussions with Specialists or Other Care Team Provider: d/w PT/OT    Education and Discussions with Family / Patient: Patient declined call to   Time Spent for Care: 30 minutes  More than 50% of total time spent on counseling and coordination of care as described above  Current Length of Stay: 1 day(s)  Current Patient Status: Inpatient   Certification Statement: The patient will continue to require additional inpatient hospital stay due to COPD exac   Discharge Plan: Anticipate discharge in 48-72 hrs to likely home  refused to work with PT     Code Status: Level 1 - Full Code    Subjective:   Grumpy today  Wants to sleep   Said his breathing is Pt is a 89 y/o F with PMHx HTN, visual/auditory hallucinations, s/p cholecystectomy, s/p WILL, L inguinal hernia admitted to [facility] for conservative vs surgical management of SBO. improved  He is on a nightshift scheduled due to his son working nights  Objective:     Vitals:   Temp (24hrs), Av 5 °F (36 9 °C), Min:98 1 °F (36 7 °C), Max:98 9 °F (37 2 °C)    Temp:  [98 1 °F (36 7 °C)-98 9 °F (37 2 °C)] 98 5 °F (36 9 °C)  HR:  [79-92] 80  Resp:  [16-22] 16  BP: (141-183)/(62-88) 141/62  SpO2:  [94 %-98 %] 94 %  Body mass index is 29 84 kg/m²  Input and Output Summary (last 24 hours): Intake/Output Summary (Last 24 hours) at 6/10/2022 1101  Last data filed at 6/10/2022 0840  Gross per 24 hour   Intake 480 ml   Output 850 ml   Net -370 ml       Physical Exam:   Physical Exam  Vitals and nursing note reviewed  Constitutional:       Appearance: He is obese  Comments: On nasal cannula    Cardiovascular:      Rate and Rhythm: Normal rate  Pulmonary:      Breath sounds: No wheezing or rales  Comments: Decreased, occasional rhonchi  Abdominal:      General: There is no distension  Tenderness: There is no abdominal tenderness  Musculoskeletal:      Right lower leg: No edema  Left lower leg: No edema  Skin:     Coloration: Skin is pale     Psychiatric:      Comments: Irritable           Additional Data:     Labs:  Results from last 7 days   Lab Units 06/10/22  0622   WBC Thousand/uL 10 63*   HEMOGLOBIN g/dL 12 6   HEMATOCRIT % 39 0   PLATELETS Thousands/uL 152   NEUTROS PCT % 90*   LYMPHS PCT % 6*   MONOS PCT % 3*   EOS PCT % 0     Results from last 7 days   Lab Units 06/10/22  0557 22  1816   SODIUM mmol/L 137 134*   POTASSIUM mmol/L 4 5 4 8   CHLORIDE mmol/L 103 98*   CO2 mmol/L 27 29   BUN mg/dL 28* 23   CREATININE mg/dL 1 26 1 24   ANION GAP mmol/L 7 7   CALCIUM mg/dL 8 9 9 2   ALBUMIN g/dL  --  3 1*   TOTAL BILIRUBIN mg/dL  --  0 46   ALK PHOS U/L  --  97   ALT U/L  --  18   AST U/L  --  10   GLUCOSE RANDOM mg/dL 243* 275*         Results from last 7 days   Lab Units 06/10/22  1010 06/10/22  0828 06/10/22  0709 06/10/22  0609 06/10/22  0403 06/10/22  0150 06/09/22  2349 06/09/22  2347 06/09/22  2149   POC GLUCOSE mg/dl 323* 221* 226* 250* 344* 438* >500* >500* >500*         Results from last 7 days   Lab Units 06/09/22 2054 06/09/22  1816   PROCALCITONIN ng/ml 0 08 0 09       Lines/Drains:  Invasive Devices  Report    Peripheral Intravenous Line  Duration           Peripheral IV 06/09/22 Right;Ventral (anterior) Forearm <1 day                  Telemetry:  Telemetry Orders (From admission, onward)             24 Hour Telemetry Monitoring  Continuous x 24 Hours (Telem)        References:    Telemetry Guidelines   Question:  Reason for 24 Hour Telemetry  Answer:  Syncope suspected to be cardiac in origin                 Telemetry Reviewed: NSR   Indication for Continued Telemetry Use: No indication for continued use  Will discontinue                Imaging: Reviewed radiology reports from this admission including: chest xray    Recent Cultures (last 7 days):         Last 24 Hours Medication List:   Current Facility-Administered Medications   Medication Dose Route Frequency Provider Last Rate    acetaminophen  650 mg Oral Q6H PRN Joe Mack MD      albuterol  2 puff Inhalation Q4H PRN Deya Clement MD      aluminum-magnesium hydroxide-simethicone  30 mL Oral Q6H PRN Bella Eden MD      aspirin  81 mg Oral Daily Bella Eden MD      azithromycin  500 mg Oral Q24H Bella Eden MD      budesonide  0 5 mg Nebulization Q12H Drake Ana, DO      carvedilol  6 25 mg Oral BID With Meals Bella Eden MD      cyclobenzaprine  10 mg Oral BID Belal Eden MD      docusate sodium  100 mg Oral BID Bella Eden MD      enoxaparin  40 mg Subcutaneous Daily Bella Eden MD      ferrous sulfate  325 mg Oral Daily With Breakfast Bella Eden MD      furosemide  40 mg Oral Daily Bella Eden MD      guaiFENesin  600 mg Oral BID Bella Eden MD      insulin regular (HumuLIN R,NovoLIN R) infusion 0 3-21 Units/hr Intravenous Titrated YOLANDE Mckinley 11 Units/hr (06/10/22 1014)    ipratropium  0 5 mg Nebulization TID Justin Nolasco MD      levalbuterol  1 25 mg Nebulization TID Justin Nolasco, MD      lisinopril  10 mg Oral Daily Bellwood Min, MD      methylPREDNISolone sodium succinate  40 mg Intravenous Q12H [address] Bellwood Min, MD      ondansetron  4 mg Intravenous Q6H PRN Bellwood Min, MD      oxyCODONE  2 5 mg Oral Q4H PRN Bellwood Min, MD      oxyCODONE  5 mg Oral Q4H PRN Bellwood Min, MD      potassium chloride  20 mEq Oral Daily Bellwood Min, MD      pravastatin  80 mg Oral Daily With Wander Cevallos, MD      senna-docusate sodium  1 tablet Oral BID Bellwood Min, MD      sodium chloride  75 mL/hr Intravenous Continuous Bellwood Min, MD 75 mL/hr (06/10/22 0944)        Today, Patient Was Seen By: Angela Pedraza PA-C    **Please Note: This note may have been constructed using a voice recognition system  **

## 2022-06-28 ENCOUNTER — EMERGENCY (EMERGENCY)
Facility: HOSPITAL | Age: 87
LOS: 1 days | Discharge: HOME | End: 2022-06-28
Attending: EMERGENCY MEDICINE | Admitting: EMERGENCY MEDICINE
Payer: MEDICARE

## 2022-06-28 VITALS
RESPIRATION RATE: 18 BRPM | SYSTOLIC BLOOD PRESSURE: 149 MMHG | WEIGHT: 125 LBS | HEART RATE: 85 BPM | DIASTOLIC BLOOD PRESSURE: 87 MMHG | OXYGEN SATURATION: 98 % | HEIGHT: 60 IN | TEMPERATURE: 98 F

## 2022-06-28 DIAGNOSIS — Z86.69 PERSONAL HISTORY OF OTHER DISEASES OF THE NERVOUS SYSTEM AND SENSE ORGANS: Chronic | ICD-10-CM

## 2022-06-28 DIAGNOSIS — Z90.49 ACQUIRED ABSENCE OF OTHER SPECIFIED PARTS OF DIGESTIVE TRACT: Chronic | ICD-10-CM

## 2022-06-28 DIAGNOSIS — Z90.710 ACQUIRED ABSENCE OF BOTH CERVIX AND UTERUS: Chronic | ICD-10-CM

## 2022-06-28 LAB
BASOPHILS # BLD AUTO: 0.03 K/UL — SIGNIFICANT CHANGE UP (ref 0–0.2)
BASOPHILS NFR BLD AUTO: 0.3 % — SIGNIFICANT CHANGE UP (ref 0–2)
EOSINOPHIL # BLD AUTO: 0 K/UL — SIGNIFICANT CHANGE UP (ref 0–0.5)
EOSINOPHIL NFR BLD AUTO: 0 % — SIGNIFICANT CHANGE UP (ref 0–6)
HCT VFR BLD CALC: 40.6 % — SIGNIFICANT CHANGE UP (ref 34.5–45)
HGB BLD-MCNC: 14.1 G/DL — SIGNIFICANT CHANGE UP (ref 11.5–15.5)
IMM GRANULOCYTES NFR BLD AUTO: 0.4 % — SIGNIFICANT CHANGE UP (ref 0–1.5)
LYMPHOCYTES # BLD AUTO: 0.96 K/UL — LOW (ref 1–3.3)
LYMPHOCYTES # BLD AUTO: 10.1 % — LOW (ref 13–44)
MCHC RBC-ENTMCNC: 32.5 PG — SIGNIFICANT CHANGE UP (ref 27–34)
MCHC RBC-ENTMCNC: 34.7 GM/DL — SIGNIFICANT CHANGE UP (ref 32–36)
MCV RBC AUTO: 93.5 FL — SIGNIFICANT CHANGE UP (ref 80–100)
MONOCYTES # BLD AUTO: 0.42 K/UL — SIGNIFICANT CHANGE UP (ref 0–0.9)
MONOCYTES NFR BLD AUTO: 4.4 % — SIGNIFICANT CHANGE UP (ref 2–14)
NEUTROPHILS # BLD AUTO: 8.05 K/UL — HIGH (ref 1.8–7.4)
NEUTROPHILS NFR BLD AUTO: 84.8 % — HIGH (ref 43–77)
NRBC # BLD: 0 /100 WBCS — SIGNIFICANT CHANGE UP (ref 0–0)
PLATELET # BLD AUTO: 192 K/UL — SIGNIFICANT CHANGE UP (ref 150–400)
RBC # BLD: 4.34 M/UL — SIGNIFICANT CHANGE UP (ref 3.8–5.2)
RBC # FLD: 12.4 % — SIGNIFICANT CHANGE UP (ref 10.3–14.5)
WBC # BLD: 9.5 K/UL — SIGNIFICANT CHANGE UP (ref 3.8–10.5)
WBC # FLD AUTO: 9.5 K/UL — SIGNIFICANT CHANGE UP (ref 3.8–10.5)

## 2022-06-28 PROCEDURE — 99284 EMERGENCY DEPT VISIT MOD MDM: CPT

## 2022-06-28 PROCEDURE — 80053 COMPREHEN METABOLIC PANEL: CPT

## 2022-06-28 PROCEDURE — 99283 EMERGENCY DEPT VISIT LOW MDM: CPT

## 2022-06-28 PROCEDURE — 83690 ASSAY OF LIPASE: CPT

## 2022-06-28 PROCEDURE — 36415 COLL VENOUS BLD VENIPUNCTURE: CPT

## 2022-06-28 PROCEDURE — 85025 COMPLETE CBC W/AUTO DIFF WBC: CPT

## 2022-06-28 RX ORDER — ONDANSETRON 8 MG/1
4 TABLET, FILM COATED ORAL ONCE
Refills: 0 | Status: DISCONTINUED | OUTPATIENT
Start: 2022-06-28 | End: 2022-07-02

## 2022-06-28 RX ORDER — SODIUM CHLORIDE 9 MG/ML
1000 INJECTION INTRAMUSCULAR; INTRAVENOUS; SUBCUTANEOUS ONCE
Refills: 0 | Status: DISCONTINUED | OUTPATIENT
Start: 2022-06-28 | End: 2022-07-02

## 2022-06-28 NOTE — ED PROVIDER NOTE - NSICDXPASTMEDICALHX_GEN_ALL_CORE_FT
PAST MEDICAL HISTORY:  Hallucinations Auditory and visual (per daughter)    HTN (hypertension)     Left inguinal hernia

## 2022-06-28 NOTE — ED PROVIDER NOTE - NSICDXPASTSURGICALHX_GEN_ALL_CORE_FT
PAST SURGICAL HISTORY:  History of cholecystectomy     History of retinal detachment     History of total abdominal hysterectomy

## 2022-06-28 NOTE — ED PROVIDER NOTE - OBJECTIVE STATEMENT
Patient with long history of left abdominal hernia, now c/o pain at hernia site. +nausea, no vomiting. Pain is the same as past hernia-related pain, but not getting better.

## 2022-06-28 NOTE — ED PROVIDER NOTE - NSFOLLOWUPINSTRUCTIONS_ED_ALL_ED_FT
Hernia, Adult                    A hernia is the bulging of an organ or tissue through a weak spot in the muscles of the abdomen (abdominal wall). Hernias develop most often near the belly button (navel) or the area where the leg meets the lower abdomen (groin).    Common types of hernias include:  •Incisional hernia. This type bulges through a scar from an abdominal surgery.      •Umbilical hernia. This type develops near the navel.      •Inguinal hernia. This type develops in the groin or scrotum.      •Femoral hernia. This type develops under the groin, in the upper thigh area.      •Hiatal hernia. This type occurs when part of the stomach slides above the muscle that separates the abdomen from the chest (diaphragm).        What are the causes?    This condition may be caused by:  •Heavy lifting.      •Coughing over a long period of time.      •Straining to have a bowel movement. Constipation can lead to straining.      •An incision made during an abdominal surgery.      •A physical problem that is present at birth (congenital defect).      •Being overweight or obese.      •Smoking.      •Excess fluid in the abdomen.      •Undescended testicles in males.        What are the signs or symptoms?    The main symptom is a skin-colored, rounded bulge in the area of the hernia. However, a bulge may not always be present. It may grow bigger or be more visible when you cough or strain (such as when lifting something heavy).    A hernia that can be pushed back into the area (is reducible) rarely causes pain. A hernia that cannot be pushed back into the area (is incarcerated) may lose its blood supply (become strangulated). A hernia that is incarcerated may cause:  •Pain.      •Fever.      •Nausea and vomiting.      •Swelling.      •Constipation.        How is this diagnosed?    A hernia may be diagnosed based on:  •Your symptoms and medical history.      •A physical exam. Your health care provider may ask you to cough or move in certain ways to see if the hernia becomes visible.    •Imaging tests, such as:  •X-rays.      •Ultrasound.      •CT scan.          How is this treated?    A hernia that is small and painless may not need to be treated. A hernia that is large or painful may be treated with surgery. Inguinal hernias may be treated with surgery to prevent incarceration or strangulation. Strangulated hernias are always treated with surgery because a lack of blood supply to the trapped organ or tissue can cause it to die.    Surgery to treat a hernia involves pushing the bulge back into place and repairing the weak area of the muscle or abdominal wall.      Follow these instructions at home:    Activity     •Avoid straining.      • Do not lift anything that is heavier than 10 lb (4.5 kg), or the limit that you are told, until your health care provider says that it is safe.      •When lifting heavy objects, lift with your leg muscles, not your back muscles.      Preventing constipation   •Take actions to prevent constipation. Constipation leads to straining with bowel movements, which can make a hernia worse or cause a hernia repair to break down. Your health care provider may recommend that you:  •Drink enough fluid to keep your urine pale yellow.      •Eat foods that are high in fiber, such as fresh fruits and vegetables, whole grains, and beans.      •Limit foods that are high in fat and processed sugars, such as fried or sweet foods.      •Take an over-the-counter or prescription medicine for constipation.        General instructions     •When coughing, try to cough gently.      •You may try to push the hernia back in place by very gently pressing on it while lying down. Do not try to force the bulge back in if it will not push in easily.      •If you are overweight, work with your health care provider to lose weight safely.      • Do not use any products that contain nicotine or tobacco, such as cigarettes and e-cigarettes. If you need help quitting, ask your health care provider.      •If you are scheduled for hernia repair, watch your hernia for any changes in shape, size, or color. Tell your health care provider about any changes or new symptoms.      •Take over-the-counter and prescription medicines only as told by your health care provider.      •Keep all follow-up visits as told by your health care provider. This is important.        Contact a health care provider if:    •You develop new pain, swelling, or redness around your hernia.    •You have signs of constipation, such as:  •Fewer bowel movements in a week than normal.      •Difficulty having a bowel movement.      •Stools that are dry, hard, or larger than normal.          Get help right away if:    •You have a fever.      •You have abdomen pain that gets worse.      •You feel nauseous or you vomit.      •You cannot push the hernia back in place by very gently pressing on it while lying down. Do not try to force the bulge back in if it will not push in easily.    •The hernia:  •Changes in shape, size, or color.      •Feels hard or tender.        These symptoms may represent a serious problem that is an emergency. Do not wait to see if the symptoms will go away. Get medical help right away. Call your local emergency services (911 in the [place]       Summary    •A hernia is the bulging of an organ or tissue through a weak spot in the muscles of the abdomen (abdominal wall).      •The main symptom is a skin-colored, rounded lump (bulge) in the hernia area. However, a bulge may not always be present. It may grow bigger or more visible when you cough or strain (such as when having a bowel movement).      •A hernia that is small and painless may not need to be treated. A hernia that is large or painful may be treated with surgery.      •Surgery to treat a hernia involves pushing the bulge back into place and repairing the weak part of the abdomen.      This information is not intended to replace advice given to you by your health care provider. Make sure you discuss any questions you have with your health care provider.

## 2022-06-28 NOTE — ED PROVIDER NOTE - PATIENT PORTAL LINK FT
You can access the FollowMyHealth Patient Portal offered by [facility] by registering at the following website: [email or web address]. By joining [employer]’s FollowMyHealth portal, you will also be able to view your health information using other applications (apps) compatible with our system.

## 2022-06-29 VITALS
DIASTOLIC BLOOD PRESSURE: 63 MMHG | OXYGEN SATURATION: 98 % | RESPIRATION RATE: 18 BRPM | HEART RATE: 75 BPM | SYSTOLIC BLOOD PRESSURE: 126 MMHG | TEMPERATURE: 98 F

## 2022-06-29 LAB
ALBUMIN SERPL ELPH-MCNC: 3.5 G/DL — SIGNIFICANT CHANGE UP (ref 3.3–5)
ALP SERPL-CCNC: 41 U/L — SIGNIFICANT CHANGE UP (ref 30–120)
ALT FLD-CCNC: 20 U/L DA — SIGNIFICANT CHANGE UP (ref 10–60)
ANION GAP SERPL CALC-SCNC: 5 MMOL/L — SIGNIFICANT CHANGE UP (ref 5–17)
AST SERPL-CCNC: 25 U/L — SIGNIFICANT CHANGE UP (ref 10–40)
BILIRUB SERPL-MCNC: 0.9 MG/DL — SIGNIFICANT CHANGE UP (ref 0.2–1.2)
BUN SERPL-MCNC: 25 MG/DL — HIGH (ref 7–23)
CALCIUM SERPL-MCNC: 9.8 MG/DL — SIGNIFICANT CHANGE UP (ref 8.4–10.5)
CHLORIDE SERPL-SCNC: 103 MMOL/L — SIGNIFICANT CHANGE UP (ref 96–108)
CO2 SERPL-SCNC: 32 MMOL/L — HIGH (ref 22–31)
CREAT SERPL-MCNC: 0.87 MG/DL — SIGNIFICANT CHANGE UP (ref 0.5–1.3)
EGFR: 61 ML/MIN/1.73M2 — SIGNIFICANT CHANGE UP
GLUCOSE SERPL-MCNC: 149 MG/DL — HIGH (ref 70–99)
LIDOCAIN IGE QN: 102 U/L — SIGNIFICANT CHANGE UP (ref 73–393)
POTASSIUM SERPL-MCNC: 4 MMOL/L — SIGNIFICANT CHANGE UP (ref 3.5–5.3)
POTASSIUM SERPL-SCNC: 4 MMOL/L — SIGNIFICANT CHANGE UP (ref 3.5–5.3)
PROT SERPL-MCNC: 7.1 G/DL — SIGNIFICANT CHANGE UP (ref 6–8.3)
SODIUM SERPL-SCNC: 140 MMOL/L — SIGNIFICANT CHANGE UP (ref 135–145)

## 2022-06-29 NOTE — ED ADULT NURSE NOTE - SKIN CAPILLARY REFILL
----- Message from Mely Oconnell sent at 2019 10:34 AM CST -----  Contact: Self  Jaspal Trujillo Jr.  MRN: [id number]  : 1959  PCP: Dillan Maria  Home Phone      [phone]  Work Phone      Not on file.  Mobile          [phone]  Mobile          [phone]      MESSAGE:   Pt requesting refill or new Rx.   Is this a refill or new RX:  refill  RX name and strength: dextroamphetamine-amphetamine (ADDERALL XR) 20 MG 24 hr capsule ()  Last office visit: 2018  Is this a 30-day or 90-day RX:  30-Day  Pharmacy name and location:  [facility]  Comments:  Patient would like to get a higher dose, states that it is not working like it used to    Phone:  [phone]     2 seconds or less

## 2022-07-28 ENCOUNTER — EMERGENCY (EMERGENCY)
Facility: HOSPITAL | Age: 87
LOS: 1 days | Discharge: HOME | End: 2022-07-28
Attending: EMERGENCY MEDICINE | Admitting: EMERGENCY MEDICINE
Payer: MEDICARE

## 2022-07-28 VITALS
HEART RATE: 78 BPM | RESPIRATION RATE: 18 BRPM | SYSTOLIC BLOOD PRESSURE: 133 MMHG | OXYGEN SATURATION: 98 % | TEMPERATURE: 98 F | DIASTOLIC BLOOD PRESSURE: 72 MMHG

## 2022-07-28 VITALS
OXYGEN SATURATION: 95 % | WEIGHT: 89.07 LBS | HEIGHT: 60 IN | HEART RATE: 73 BPM | TEMPERATURE: 98 F | SYSTOLIC BLOOD PRESSURE: 94 MMHG | DIASTOLIC BLOOD PRESSURE: 57 MMHG | RESPIRATION RATE: 20 BRPM

## 2022-07-28 DIAGNOSIS — Z86.69 PERSONAL HISTORY OF OTHER DISEASES OF THE NERVOUS SYSTEM AND SENSE ORGANS: Chronic | ICD-10-CM

## 2022-07-28 DIAGNOSIS — Z90.49 ACQUIRED ABSENCE OF OTHER SPECIFIED PARTS OF DIGESTIVE TRACT: Chronic | ICD-10-CM

## 2022-07-28 DIAGNOSIS — Z90.710 ACQUIRED ABSENCE OF BOTH CERVIX AND UTERUS: Chronic | ICD-10-CM

## 2022-07-28 LAB
ALBUMIN SERPL ELPH-MCNC: 3.1 G/DL — LOW (ref 3.3–5)
ALP SERPL-CCNC: 44 U/L — SIGNIFICANT CHANGE UP (ref 40–120)
ALT FLD-CCNC: 24 U/L — SIGNIFICANT CHANGE UP (ref 12–78)
ANION GAP SERPL CALC-SCNC: 8 MMOL/L — SIGNIFICANT CHANGE UP (ref 5–17)
APPEARANCE UR: ABNORMAL
AST SERPL-CCNC: 27 U/L — SIGNIFICANT CHANGE UP (ref 15–37)
BASOPHILS # BLD AUTO: 0.02 K/UL — SIGNIFICANT CHANGE UP (ref 0–0.2)
BASOPHILS NFR BLD AUTO: 0.4 % — SIGNIFICANT CHANGE UP (ref 0–2)
BILIRUB SERPL-MCNC: 0.8 MG/DL — SIGNIFICANT CHANGE UP (ref 0.2–1.2)
BILIRUB UR-MCNC: NEGATIVE — SIGNIFICANT CHANGE UP
BUN SERPL-MCNC: 23 MG/DL — SIGNIFICANT CHANGE UP (ref 7–23)
CALCIUM SERPL-MCNC: 8.9 MG/DL — SIGNIFICANT CHANGE UP (ref 8.5–10.1)
CHLORIDE SERPL-SCNC: 105 MMOL/L — SIGNIFICANT CHANGE UP (ref 96–108)
CO2 SERPL-SCNC: 29 MMOL/L — SIGNIFICANT CHANGE UP (ref 22–31)
COLOR SPEC: YELLOW — SIGNIFICANT CHANGE UP
CREAT SERPL-MCNC: 0.68 MG/DL — SIGNIFICANT CHANGE UP (ref 0.5–1.3)
DIFF PNL FLD: ABNORMAL
EGFR: 80 ML/MIN/1.73M2 — SIGNIFICANT CHANGE UP
EOSINOPHIL # BLD AUTO: 0 K/UL — SIGNIFICANT CHANGE UP (ref 0–0.5)
EOSINOPHIL NFR BLD AUTO: 0 % — SIGNIFICANT CHANGE UP (ref 0–6)
GLUCOSE SERPL-MCNC: 90 MG/DL — SIGNIFICANT CHANGE UP (ref 70–99)
GLUCOSE UR QL: NEGATIVE — SIGNIFICANT CHANGE UP
HCT VFR BLD CALC: 40.2 % — SIGNIFICANT CHANGE UP (ref 34.5–45)
HGB BLD-MCNC: 13.6 G/DL — SIGNIFICANT CHANGE UP (ref 11.5–15.5)
IMM GRANULOCYTES NFR BLD AUTO: 0.4 % — SIGNIFICANT CHANGE UP (ref 0–1.5)
KETONES UR-MCNC: ABNORMAL
LEUKOCYTE ESTERASE UR-ACNC: ABNORMAL
LIDOCAIN IGE QN: 119 U/L — SIGNIFICANT CHANGE UP (ref 73–393)
LYMPHOCYTES # BLD AUTO: 0.93 K/UL — LOW (ref 1–3.3)
LYMPHOCYTES # BLD AUTO: 17.1 % — SIGNIFICANT CHANGE UP (ref 13–44)
MCHC RBC-ENTMCNC: 32.5 PG — SIGNIFICANT CHANGE UP (ref 27–34)
MCHC RBC-ENTMCNC: 33.8 GM/DL — SIGNIFICANT CHANGE UP (ref 32–36)
MCV RBC AUTO: 96.2 FL — SIGNIFICANT CHANGE UP (ref 80–100)
MONOCYTES # BLD AUTO: 0.4 K/UL — SIGNIFICANT CHANGE UP (ref 0–0.9)
MONOCYTES NFR BLD AUTO: 7.3 % — SIGNIFICANT CHANGE UP (ref 2–14)
NEUTROPHILS # BLD AUTO: 4.08 K/UL — SIGNIFICANT CHANGE UP (ref 1.8–7.4)
NEUTROPHILS NFR BLD AUTO: 74.8 % — SIGNIFICANT CHANGE UP (ref 43–77)
NITRITE UR-MCNC: POSITIVE
NRBC # BLD: 0 /100 WBCS — SIGNIFICANT CHANGE UP (ref 0–0)
PH UR: 5 — SIGNIFICANT CHANGE UP (ref 5–8)
PLATELET # BLD AUTO: 193 K/UL — SIGNIFICANT CHANGE UP (ref 150–400)
POTASSIUM SERPL-MCNC: 3.8 MMOL/L — SIGNIFICANT CHANGE UP (ref 3.5–5.3)
POTASSIUM SERPL-SCNC: 3.8 MMOL/L — SIGNIFICANT CHANGE UP (ref 3.5–5.3)
PROT SERPL-MCNC: 6.6 G/DL — SIGNIFICANT CHANGE UP (ref 6–8.3)
PROT UR-MCNC: NEGATIVE — SIGNIFICANT CHANGE UP
RBC # BLD: 4.18 M/UL — SIGNIFICANT CHANGE UP (ref 3.8–5.2)
RBC # FLD: 12.6 % — SIGNIFICANT CHANGE UP (ref 10.3–14.5)
SODIUM SERPL-SCNC: 142 MMOL/L — SIGNIFICANT CHANGE UP (ref 135–145)
SP GR SPEC: 1.02 — SIGNIFICANT CHANGE UP (ref 1.01–1.02)
UROBILINOGEN FLD QL: NEGATIVE — SIGNIFICANT CHANGE UP
WBC # BLD: 5.45 K/UL — SIGNIFICANT CHANGE UP (ref 3.8–10.5)
WBC # FLD AUTO: 5.45 K/UL — SIGNIFICANT CHANGE UP (ref 3.8–10.5)

## 2022-07-28 PROCEDURE — 80053 COMPREHEN METABOLIC PANEL: CPT

## 2022-07-28 PROCEDURE — 36415 COLL VENOUS BLD VENIPUNCTURE: CPT

## 2022-07-28 PROCEDURE — 87186 SC STD MICRODIL/AGAR DIL: CPT

## 2022-07-28 PROCEDURE — 85025 COMPLETE CBC W/AUTO DIFF WBC: CPT

## 2022-07-28 PROCEDURE — 87086 URINE CULTURE/COLONY COUNT: CPT

## 2022-07-28 PROCEDURE — 83690 ASSAY OF LIPASE: CPT

## 2022-07-28 PROCEDURE — 99284 EMERGENCY DEPT VISIT MOD MDM: CPT | Mod: FS

## 2022-07-28 PROCEDURE — 99283 EMERGENCY DEPT VISIT LOW MDM: CPT

## 2022-07-28 PROCEDURE — 81001 URINALYSIS AUTO W/SCOPE: CPT

## 2022-07-28 RX ORDER — CEFUROXIME AXETIL 250 MG
1 TABLET ORAL
Qty: 14 | Refills: 0
Start: 2022-07-28 | End: 2022-08-03

## 2022-07-28 RX ORDER — SODIUM CHLORIDE 9 MG/ML
1000 INJECTION INTRAMUSCULAR; INTRAVENOUS; SUBCUTANEOUS ONCE
Refills: 0 | Status: COMPLETED | OUTPATIENT
Start: 2022-07-28 | End: 2022-07-28

## 2022-07-28 RX ADMIN — SODIUM CHLORIDE 1000 MILLILITER(S): 9 INJECTION INTRAMUSCULAR; INTRAVENOUS; SUBCUTANEOUS at 19:05

## 2022-07-28 NOTE — ED PROVIDER NOTE - CONSTITUTIONAL, MLM
normal... Well appearing elderly female, awake, alert, oriented to person and in no apparent distress.

## 2022-07-28 NOTE — ED PROVIDER NOTE - CARE PROVIDER_API CALL
Harrison Buitrago ()  Internal Medicine  [address] [place] [address]  Phone: [phone]  Fax: [phone]  Follow Up Time: 1-3 Days

## 2022-07-28 NOTE — ED ADULT TRIAGE NOTE - CHIEF COMPLAINT QUOTE
increased bowel and bladder movements.  patient demented, but daughter states that she does complain of abdominal pain

## 2022-07-28 NOTE — ED PROVIDER NOTE - ATTENDING APP SHARED VISIT CONTRIBUTION OF CARE
Exam revealed thin elderly white female in NAD with clear lungs, normal heart sounds and soft and non tender abdomen. I agree with plan and management outlined by PA. Exam revealed thin elderly white female in NAD with clear lungs, normal heart sounds and soft and non tender abdomen. Large soft and non incarcerated present. I agree with plan and management outlined by PA.

## 2022-07-28 NOTE — ED PROVIDER NOTE - NS ED ATTENDING STATEMENT MOD
This was a shared visit with the SUE. I reviewed and verified the documentation and independently performed the documented:

## 2022-07-28 NOTE — ED ADULT NURSE NOTE - ED CARDIAC RATE
Use an antihistamine such as Claritin, Zyrtec or Allegra to dry you out.     Use mucinex (guaifenisin) to break up mucous up to 2400mg/day to loosen any mucous. The mucinex DM pill has a cough suppressant that can be used at night to stop the tickle at the back of your throat.    Use Flonase 1-2 sprays/nostril per day. It is a local acting steroid nasal spray, if you develop a bloody nose, stop using the medication immediately.    Use warm salt water gargles to ease your throat pain. Warm salt water gargles as needed for sore throat-  1/2 tsp salt to 1 cup warm water, gargle as desired. Hot tea with honey.     Cough syrup at night time  Patient educated might make drowsy and to just take at night.  Patient can take Delsym during the day.     if symptoms persist or worsen patient is to follow up with primary.            Viral Upper Respiratory Illness (Adult)  You have a viral upper respiratory illness (URI), which is another term for the common cold. This illness is contagious during the first few days. It is spread through the air by coughing and sneezing. It may also be spread by direct contact (touching the sick person and then touching your own eyes, nose, or mouth). Frequent handwashing will decrease risk of spread. Most viral illnesses go away within 7 to 10 days with rest and simple home remedies. Sometimes the illness may last for several weeks. Antibiotics will not kill a virus, and they are generally not prescribed for this condition.    Home care  · If symptoms are severe, rest at home for the first 2 to 3 days. When you resume activity, don't let yourself get too tired.  · Avoid being exposed to cigarette smoke (yours or others).  · You may use acetaminophen or ibuprofen to control pain and fever, unless another medicine was prescribed. (Note: If you have chronic liver or kidney disease, have ever had a stomach ulcer or gastrointestinal bleeding, or are taking blood-thinning medicines, talk with your  healthcare provider before using these medicines.) Aspirin should never be given to anyone under 18 years of age who is ill with a viral infection or fever. It may cause severe liver or brain damage.  · Your appetite may be poor, so a light diet is fine. Avoid dehydration by drinking 6 to 8 glasses of fluids per day (water, soft drinks, juices, tea, or soup). Extra fluids will help loosen secretions in the nose and lungs.  · Over-the-counter cold medicines will not shorten the length of time youre sick, but they may be helpful for the following symptoms: cough, sore throat, and nasal and sinus congestion. (Note: Do not use decongestants if you have high blood pressure.)  Follow-up care  Follow up with your healthcare provider, or as advised.  When to seek medical advice  Call your healthcare provider right away if any of these occur:  · Cough with lots of colored sputum (mucus)  · Severe headache; face, neck, or ear pain  · Difficulty swallowing due to throat pain  · Fever of 100.4°F (38°C)  Call 911, or get immediate medical care  Call emergency services right away if any of these occur:  · Chest pain, shortness of breath, wheezing, or difficulty breathing  · Coughing up blood  · Inability to swallow due to throat pain  Date Last Reviewed: 9/13/2015  © 6027-6733 [employer]. [address] [place] [address], [place] [address]. All rights reserved. This information is not intended as a substitute for professional medical care. Always follow your healthcare professional's instructions.         normal

## 2022-07-28 NOTE — ED ADULT NURSE NOTE - NSIMPLEMENTINTERV_GEN_ALL_ED
Implemented All Fall with Harm Risk Interventions:  [place] to call system. Call bell, personal items and telephone within reach. Instruct patient to call for assistance. Room bathroom lighting operational. Non-slip footwear when patient is off stretcher. Physically safe environment: no spills, clutter or unnecessary equipment. Stretcher in lowest position, wheels locked, appropriate side rails in place. Provide visual cue, wrist band, yellow gown, etc. Monitor gait and stability. Monitor for mental status changes and reorient to person, place, and time. Review medications for side effects contributing to fall risk. Reinforce activity limits and safety measures with patient and family. Provide visual clues: red socks.

## 2022-07-28 NOTE — ED PROVIDER NOTE - PROGRESS NOTE DETAILS
Daughter requesting stool sample/c dif to be check, patient unable to have BM while in ED. Recommend outpatient GI/pmd follow up. Daughter also requesting urine to be checked because patient has been urinating more frequency. + nitrites, + leuks, TMTC bacteria, will dc with abx for UTI

## 2022-07-28 NOTE — ED ADULT NURSE NOTE - NS_NURSE_DISC_TEACHING_YN_ED_ALL_ED
Physical Therapy Daily Treatment Note      Patient: Javier Arriola   : 1988  Referring practitioner: No ref. provider found  Date of Initial Visit: Type: THERAPY  Noted: 2021  Today's Date: 9/15/2021  Patient seen for 4 sessions           Subjective Questionnaire:       Subjective Evaluation    History of Present Illness    Subjective comment: Pt presents to clinic with report of decreased pain stating it is a 5/10 currently and reported he doesn't wake up with terrible pain.Pain  Current pain ratin           Objective   See Exercise, Manual, and Modality Logs for complete treatment.       Assessment & Plan     Assessment  Assessment details: Upon sitting up from lumbar traction pt reported increased pain stating that is how it is after traction then it is better.  Applied MH in supine with no difference when he stood up.  Pt reported feeling good while on traction and MH.  Pt reported his back feels better the day after traction.        Visit Diagnoses:    ICD-10-CM ICD-9-CM   1. Lumbar pain  M54.5 724.2       Progress per Plan of Care and Progress strengthening /stabilization /functional activity           Timed:  Manual Therapy:         mins  21962;  Therapeutic Exercise:    20     mins  74933;     Neuromuscular Svetlana:        mins  65498;    Therapeutic Activity:          mins  57135;     Gait Training:           mins  88273;     Ultrasound:          mins  92178;    Electrical Stimulation:         mins  43316 ( );  Aquatic Therapy          mins  87005    Untimed:  Electrical Stimulation:         mins  96664 ( );  Mechanical Traction:    15     mins  05852;     Timed Treatment:   20   mins   Total Treatment:     35   mins  Liseth Spears PTA  Physical Therapist    
Yes

## 2022-07-28 NOTE — ED PROVIDER NOTE - CLINICAL SUMMARY MEDICAL DECISION MAKING FREE TEXT BOX
89 yo white female with 1-2 days of watery non bloody diarrhea with minimal abdominal cramps. No recent AbXs. No ill contacts. This case will require evaluation, labs, IVFs and meds.

## 2022-07-28 NOTE — ED PROVIDER NOTE - PATIENT PORTAL LINK FT
You can access the FollowMyHealth Patient Portal offered by [facility] by registering at the following website: [email or web address]. By joining [employer]’s FollowMyHealth portal, you will also be able to view your health information using other applications (apps) compatible with our system.

## 2022-07-28 NOTE — ED PROVIDER NOTE - OBJECTIVE STATEMENT
89-year-old female with history of dementia presents to the ED with her daughter for loose stool since 12 PM yesterday.  Associated with decreased appetite.  Stool is not watery, no blood. Patient with chronic abdominal hernia.  History of cholecystectomy and hysterectomy.  Daughter explains that prior to having looser stool she gave patient Colace.  No abdominal pain as per daughter or per patient. Patient's daughter is concerned for C. difficile.  However no recent antibiotic use travel or sick contacts. Denies fever, chills, chest pain, sob, abd pain, N/V, urinary sxs, flank pain.

## 2022-07-28 NOTE — ED ADULT NURSE NOTE - OBJECTIVE STATEMENT
Received pt in bed alert, demented. Pt with daughter. C/O abdominal pain generalized.  Pt daughter reports that her mother has been "going to the bathroom more".  pt poor historian.  denies chest pain, sob. Pt also has had no recent anbx use.

## 2022-07-29 RX ORDER — CEFPODOXIME PROXETIL 100 MG
10 TABLET ORAL
Qty: 140 | Refills: 0
Start: 2022-07-29 | End: 2022-08-04

## 2022-07-29 NOTE — ED POST DISCHARGE NOTE - REASON FOR FOLLOW-UP
daughter called stating abx pill is too large and patient unable to swallow, spoke to pharmacist who states patient cant cut or crush the pills. abx changed to liquid vantin. Other

## 2022-11-19 ENCOUNTER — EMERGENCY (EMERGENCY)
Facility: HOSPITAL | Age: 87
LOS: 1 days | Discharge: HOME | End: 2022-11-19
Attending: STUDENT IN AN ORGANIZED HEALTH CARE EDUCATION/TRAINING PROGRAM | Admitting: STUDENT IN AN ORGANIZED HEALTH CARE EDUCATION/TRAINING PROGRAM
Payer: MEDICARE

## 2022-11-19 VITALS
TEMPERATURE: 100 F | HEART RATE: 85 BPM | RESPIRATION RATE: 16 BRPM | WEIGHT: 89.95 LBS | HEIGHT: 61 IN | OXYGEN SATURATION: 95 % | DIASTOLIC BLOOD PRESSURE: 90 MMHG | SYSTOLIC BLOOD PRESSURE: 110 MMHG

## 2022-11-19 VITALS
SYSTOLIC BLOOD PRESSURE: 110 MMHG | DIASTOLIC BLOOD PRESSURE: 58 MMHG | TEMPERATURE: 98 F | OXYGEN SATURATION: 95 % | RESPIRATION RATE: 16 BRPM | HEART RATE: 58 BPM

## 2022-11-19 DIAGNOSIS — Z86.69 PERSONAL HISTORY OF OTHER DISEASES OF THE NERVOUS SYSTEM AND SENSE ORGANS: Chronic | ICD-10-CM

## 2022-11-19 DIAGNOSIS — Z90.49 ACQUIRED ABSENCE OF OTHER SPECIFIED PARTS OF DIGESTIVE TRACT: Chronic | ICD-10-CM

## 2022-11-19 DIAGNOSIS — Z90.710 ACQUIRED ABSENCE OF BOTH CERVIX AND UTERUS: Chronic | ICD-10-CM

## 2022-11-19 LAB
ALBUMIN SERPL ELPH-MCNC: 3.1 G/DL — LOW (ref 3.3–5)
ALP SERPL-CCNC: 40 U/L — SIGNIFICANT CHANGE UP (ref 40–120)
ALT FLD-CCNC: 20 U/L — SIGNIFICANT CHANGE UP (ref 12–78)
ANION GAP SERPL CALC-SCNC: 8 MMOL/L — SIGNIFICANT CHANGE UP (ref 5–17)
AST SERPL-CCNC: 23 U/L — SIGNIFICANT CHANGE UP (ref 15–37)
BASOPHILS # BLD AUTO: 0.01 K/UL — SIGNIFICANT CHANGE UP (ref 0–0.2)
BASOPHILS NFR BLD AUTO: 0.1 % — SIGNIFICANT CHANGE UP (ref 0–2)
BILIRUB SERPL-MCNC: 0.5 MG/DL — SIGNIFICANT CHANGE UP (ref 0.2–1.2)
BUN SERPL-MCNC: 21 MG/DL — SIGNIFICANT CHANGE UP (ref 7–23)
CALCIUM SERPL-MCNC: 8.3 MG/DL — LOW (ref 8.5–10.1)
CHLORIDE SERPL-SCNC: 99 MMOL/L — SIGNIFICANT CHANGE UP (ref 96–108)
CO2 SERPL-SCNC: 28 MMOL/L — SIGNIFICANT CHANGE UP (ref 22–31)
CREAT SERPL-MCNC: 0.8 MG/DL — SIGNIFICANT CHANGE UP (ref 0.5–1.3)
EGFR: 67 ML/MIN/1.73M2 — SIGNIFICANT CHANGE UP
EOSINOPHIL # BLD AUTO: 0 K/UL — SIGNIFICANT CHANGE UP (ref 0–0.5)
EOSINOPHIL NFR BLD AUTO: 0 % — SIGNIFICANT CHANGE UP (ref 0–6)
FLUAV AG NPH QL: SIGNIFICANT CHANGE UP
FLUBV AG NPH QL: SIGNIFICANT CHANGE UP
GLUCOSE SERPL-MCNC: 120 MG/DL — HIGH (ref 70–99)
HCT VFR BLD CALC: 41.9 % — SIGNIFICANT CHANGE UP (ref 34.5–45)
HGB BLD-MCNC: 14.6 G/DL — SIGNIFICANT CHANGE UP (ref 11.5–15.5)
IMM GRANULOCYTES NFR BLD AUTO: 0.4 % — SIGNIFICANT CHANGE UP (ref 0–0.9)
LYMPHOCYTES # BLD AUTO: 0.84 K/UL — LOW (ref 1–3.3)
LYMPHOCYTES # BLD AUTO: 8.8 % — LOW (ref 13–44)
MCHC RBC-ENTMCNC: 32 PG — SIGNIFICANT CHANGE UP (ref 27–34)
MCHC RBC-ENTMCNC: 34.8 GM/DL — SIGNIFICANT CHANGE UP (ref 32–36)
MCV RBC AUTO: 91.9 FL — SIGNIFICANT CHANGE UP (ref 80–100)
MONOCYTES # BLD AUTO: 0.45 K/UL — SIGNIFICANT CHANGE UP (ref 0–0.9)
MONOCYTES NFR BLD AUTO: 4.7 % — SIGNIFICANT CHANGE UP (ref 2–14)
NEUTROPHILS # BLD AUTO: 8.16 K/UL — HIGH (ref 1.8–7.4)
NEUTROPHILS NFR BLD AUTO: 86 % — HIGH (ref 43–77)
NRBC # BLD: 0 /100 WBCS — SIGNIFICANT CHANGE UP (ref 0–0)
PLATELET # BLD AUTO: 161 K/UL — SIGNIFICANT CHANGE UP (ref 150–400)
POTASSIUM SERPL-MCNC: 3.4 MMOL/L — LOW (ref 3.5–5.3)
POTASSIUM SERPL-SCNC: 3.4 MMOL/L — LOW (ref 3.5–5.3)
PROT SERPL-MCNC: 7 G/DL — SIGNIFICANT CHANGE UP (ref 6–8.3)
RBC # BLD: 4.56 M/UL — SIGNIFICANT CHANGE UP (ref 3.8–5.2)
RBC # FLD: 12.4 % — SIGNIFICANT CHANGE UP (ref 10.3–14.5)
RSV RNA NPH QL NAA+NON-PROBE: SIGNIFICANT CHANGE UP
SARS-COV-2 RNA SPEC QL NAA+PROBE: DETECTED
SODIUM SERPL-SCNC: 135 MMOL/L — SIGNIFICANT CHANGE UP (ref 135–145)
WBC # BLD: 9.5 K/UL — SIGNIFICANT CHANGE UP (ref 3.8–10.5)
WBC # FLD AUTO: 9.5 K/UL — SIGNIFICANT CHANGE UP (ref 3.8–10.5)

## 2022-11-19 PROCEDURE — 87637 SARSCOV2&INF A&B&RSV AMP PRB: CPT

## 2022-11-19 PROCEDURE — 99285 EMERGENCY DEPT VISIT HI MDM: CPT | Mod: 25

## 2022-11-19 PROCEDURE — 96360 HYDRATION IV INFUSION INIT: CPT

## 2022-11-19 PROCEDURE — 93010 ELECTROCARDIOGRAM REPORT: CPT

## 2022-11-19 PROCEDURE — 85025 COMPLETE CBC W/AUTO DIFF WBC: CPT

## 2022-11-19 PROCEDURE — 93005 ELECTROCARDIOGRAM TRACING: CPT

## 2022-11-19 PROCEDURE — 80053 COMPREHEN METABOLIC PANEL: CPT

## 2022-11-19 PROCEDURE — 71045 X-RAY EXAM CHEST 1 VIEW: CPT

## 2022-11-19 PROCEDURE — 71045 X-RAY EXAM CHEST 1 VIEW: CPT | Mod: 26

## 2022-11-19 PROCEDURE — 99284 EMERGENCY DEPT VISIT MOD MDM: CPT | Mod: FS,CS

## 2022-11-19 PROCEDURE — 36415 COLL VENOUS BLD VENIPUNCTURE: CPT

## 2022-11-19 RX ORDER — SODIUM CHLORIDE 9 MG/ML
1000 INJECTION INTRAMUSCULAR; INTRAVENOUS; SUBCUTANEOUS ONCE
Refills: 0 | Status: COMPLETED | OUTPATIENT
Start: 2022-11-19 | End: 2022-11-19

## 2022-11-19 RX ORDER — ACETAMINOPHEN 500 MG
650 TABLET ORAL ONCE
Refills: 0 | Status: DISCONTINUED | OUTPATIENT
Start: 2022-11-19 | End: 2022-11-19

## 2022-11-19 RX ADMIN — SODIUM CHLORIDE 1000 MILLILITER(S): 9 INJECTION INTRAMUSCULAR; INTRAVENOUS; SUBCUTANEOUS at 19:50

## 2022-11-19 RX ADMIN — SODIUM CHLORIDE 1000 MILLILITER(S): 9 INJECTION INTRAMUSCULAR; INTRAVENOUS; SUBCUTANEOUS at 21:09

## 2022-11-19 NOTE — ED ADULT NURSE REASSESSMENT NOTE - NS ED NURSE REASSESS COMMENT FT1
Patient demented. Refusing to take tylenol. Dr. Nash made aware and told will change the order to IV tylenol.

## 2022-11-19 NOTE — ED PROVIDER NOTE - PATIENT PORTAL LINK FT
You can access the FollowMyHealth Patient Portal offered by [facility] by registering at the following website: [email or web address]. By joining [employer]’s FollowMyHealth portal, you will also be able to view your health information using other applications (apps) compatible with our system.

## 2022-11-19 NOTE — ED PROVIDER NOTE - PSYCHIATRIC, MLM
Alert and oriented to person, place, not time/situation. normal mood and affect. no apparent risk to self or others.

## 2022-11-19 NOTE — ED ADULT TRIAGE NOTE - CHIEF COMPLAINT QUOTE
Patient BIBA from home for Covid exposure and fever. Per EMS, other people in the home of the patient have Covid and the patient developed weakness and fever today.

## 2022-11-19 NOTE — ED PROVIDER NOTE - WR INTERPRETATION 1
Patient : Cadence Kumar Age: 30 [facility] ([facility], [facility], [facility]).: [facility] [[id number]]                  Soco Mckinley PA-C  12/31/17 0154     nad

## 2022-11-19 NOTE — ED ADULT NURSE NOTE - NS ED NURSE DC PT EDUCATION RESOURCES
"Anesthesia Transfer of Care Note    Patient: Santi Beal    Procedure(s) Performed: Procedure(s) (LRB):  REPLACEMENT, PULSE GENERATOR, ICD (Left)    Patient location: PACU    Anesthesia Type: general    Transport from OR: Transported from OR on 6-10 L/min O2 by face mask with adequate spontaneous ventilation    Post pain: adequate analgesia    Post assessment: no apparent anesthetic complications and tolerated procedure well    Post vital signs: stable    Level of consciousness: awake, alert and oriented    Nausea/Vomiting: no nausea/vomiting    Complications: none    Transfer of care protocol was followedComments: Bedside report to PACU RN, opportunity for questions given.       Last vitals:   Visit Vitals  BP (!) 152/82 (BP Location: Right arm, Patient Position: Lying)   Pulse 67   Temp 36.8 °C (98.2 °F) (Temporal)   Resp 18   Ht 5' 9" (1.753 m)   Wt 108.9 kg (240 lb)   SpO2 100%   BMI 35.44 kg/m²     "
Yes

## 2022-11-19 NOTE — ED PROVIDER NOTE - PROGRESS NOTE DETAILS
pt ambulatory with walker looks well spoke with daughter advised paxlovid daughter declined pt not vaccinated offered antibody infusion number  transport requesting daughter at home

## 2022-11-19 NOTE — ED ADULT NURSE NOTE - NSIMPLEMENTINTERV_GEN_ALL_ED
Implemented All Fall with Harm Risk Interventions:  [place] to call system. Call bell, personal items and telephone within reach. Instruct patient to call for assistance. Room bathroom lighting operational. Non-slip footwear when patient is off stretcher. Physically safe environment: no spills, clutter or unnecessary equipment. Stretcher in lowest position, wheels locked, appropriate side rails in place. Provide visual cue, wrist band, yellow gown, etc. Monitor gait and stability. Monitor for mental status changes and reorient to person, place, and time. Review medications for side effects contributing to fall risk. Reinforce activity limits and safety measures with patient and family. Provide visual clues: red socks.

## 2022-11-19 NOTE — ED PROVIDER NOTE - NSFOLLOWUPINSTRUCTIONS_ED_ALL_ED_FT
take Tylenol for fever drink plenty of water  follow up with pcp  return to er for any worsening symptoms   call  to schedule monoclonal antibodies     COVID-19      COVID-19, or coronavirus disease 2019, is a systemic infection that is caused by a novel coronavirus called SARS-CoV-2. In some people, the virus may not cause any symptoms. In others, it may cause mild or severe symptoms. Some people with severe infection develop severe disease, which may lead to acute respiratory distress syndrome and shock.      What are the causes?  The human body, showing how the coronavirus travels from the air to a person's lungs.   This illness is caused by a virus. The virus may be in the air or on surfaces as droplets or aerosols of various sizes. You may catch the virus by:  •Breathing in droplets from an infected person. Droplets can be spread by a person breathing, speaking, singing, coughing, or sneezing.      •Touching something, like a table or a doorknob, that was exposed to the virus (is contaminated) and then touching your mouth, nose, or eyes.        What increases the risk?    Risk for infection:     You are more likely to become infected with the COVID-19 virus if:  •You are within 6 ft (1.8 m) of a person with COVID-19 for 15 minutes or longer.      •You are providing care for a person who is infected with COVID-19.      •You are in close personal contact with other people. Close personal contact includes hugging, kissing, or sharing eating or drinking utensils.      Risk for serious illness due to COVID-19:    You are more likely to become seriously ill from the COVID-19 virus if:  •You have cancer.    •You have a long-term (chronic) disease, such as:  •Chronic lung disease, including pulmonary embolism, chronic obstructive pulmonary disease, and cystic fibrosis.      •Long-term disease that lowers your body's ability to fight infection (immunocompromise).      •Serious cardiac conditions, such as heart failure, coronary artery disease, or cardiomyopathy.      •Diabetes.      •Chronic kidney disease.      •Liver diseases, including cirrhosis, nonalcoholic fatty liver disease, alcoholic liver disease, or autoimmune hepatitis.        •You are obese.      •You are pregnant or recently pregnant.      •You have sickle cell disease.        What are the signs or symptoms?    Symptoms of this condition can range from mild to severe. Symptoms may appear any time from 2 to 14 days after being exposed to the virus. They include:  •Fever or chills.      •Difficulty breathing or having shortness of breath.      •Feeling tired or very tired.      •Headaches, body aches, or muscle aches.      •Runny or stuffy nose, sneezing, cough, sore throat.      •New loss of taste or smell. This is rare.      Some people may also have stomach problems, such as nausea, vomiting, or diarrhea.    Other people may not have any symptoms of COVID-19.      How is this diagnosed?  A sample being collected by swabbing the nose.   This condition may be diagnosed by testing samples to check for the COVID-19 virus. The most common tests are the PCR test and the antigen test. Tests may be done in the lab or at home. They include:  •Using a swab to take a sample of fluid from the back of your nose and throat (nasopharyngeal fluid), from your nose, or from your throat.      •Testing a sample of saliva from your mouth.      •Testing a sample of coughed-up mucus from your lungs (sputum).        How is this treated?    Treatment for COVID-19 infection depends on the severity of the condition.  •Mild symptoms can be managed at home with rest, fluids, and over-the-counter medicines.    •Serious symptoms may be treated in a hospital intensive care unit, or ICU. Treatment in the ICU may include:  •Supplemental oxygen. Extra oxygen is given through a tube in the nose, a face mask, or a harris.    •Medicines. You may be given medicines:  •To help your body fight off certain viruses that can cause disease (antivirals).      •To reduce inflammation and suppress the immune system (corticosteroids).      •To prevent or treat blood clots, if they develop (antithrombotics).        •Antibodies made in a lab that can restore or strengthen your body's natural immune system (monoclonal antibody).      •Positioning you to lie on your stomach (prone position). This makes it easier for oxygen to get into the lungs.      •Infection control measures.        If you are at risk for more serious illness due to COVID-19, your health care provider may prescribe a combination of two long-acting monoclonal antibodies, administered together every 6 months.      How is this prevented?    To protect yourself:   •Get vaccinated. COVID-19 vaccines are available for everyone aged 6 months and older.  •Vaccination is recommended for women who are pregnant, may become pregnant, or are breastfeeding.      •Patients who require major surgery should plan their vaccination for several days before or after the surgery.      •Talk to your health care provider about receiving experimental monoclonal antibodies. This treatment has been approved under emergency use authorization to prevent severe illness before or after you are exposed to the COVID-19 virus. You may be given monoclonal antibodies if:  •You are moderately or severely immunocompromised. This includes treatments that lower your immune response. People with immunocompromise may not develop protection against COVID-19 when they are vaccinated.      •You cannot be vaccinated. You may not receive a vaccine if you have a severe allergic reaction to the vaccine or its components.      •You are not fully vaccinated.      •You are in close contact with a person who is infected with SARS-CoV-2, or at high risk of exposure to SARS-CoV-2, in an institution in which COVID-19 is occurring.      •You are at risk of illness from new variants of the COVID-19 virus.        To protect others:     If you have symptoms of COVID-19, take steps to prevent the virus from spreading to others.  •Stay home. Leave your house only to seek medical care. Do not use public transport, if possible.      •Do not travel while you are sick.      •Wash your hands often with soap and water for at least 20 seconds. If soap and water are not available, use alcohol-based hand .      •Stay away from other members of your household. Let healthy household members care for children and pets, if possible. If you have to care for children or pets, wash your hands often and wear a mask. If possible, stay in your own room, separate from others. Use a different bathroom.      •Make sure that all people in your household wash their hands well and often.      •Cough or sneeze into a tissue or your sleeve or elbow. Do not cough or sneeze into your hand or into the air.        Where to find more information    •Centers for Disease Control and Prevention: www.cdc.gov/coronavirus      •World Health Organization: www.who.int/health-topics/coronavirus        Get help right away if:    •You have trouble breathing.      •You have pain or pressure in your chest.      •You have confusion.      •You have bluish lips and fingernails.      •You have difficulty waking from sleep.      •You have symptoms that get worse.      These symptoms may be an emergency. Get help right away. Call 911.   • Do not wait to see if the symptoms will go away.        •  Do not drive yourself to the hospital.         Summary    •COVID-19 is a respiratory infection that is caused by a virus.      •Some people with a severe COVID-19 infection develop severe disease, which may lead to acute respiratory distress syndrome and shock.      •The virus that causes COVID-19 is contagious. This means that it can spread from person to person through droplets from breathing, speaking, singing, coughing, or sneezing.      •Mild symptoms of COVID-19 can be managed at home with rest, fluids, and over-the-counter medicines.      This information is not intended to replace advice given to you by your health care provider. Make sure you discuss any questions you have with your health care provider.      Document Revised: 09/09/2022 Document Reviewed: 09/09/2022    [employer] Patient Education © 2022 [employer]

## 2022-11-19 NOTE — ED ADULT NURSE NOTE - OBJECTIVE STATEMENT
pt comes to ED BIBA for evaluation of fever according to EMS pts family is Covid positive pt has dementia confused disoriented oriented only to person patient complains of pain only to lower extremities pt has dark areas both shins and is tender to the touch pt has what appears to be a hernia left groin

## 2022-11-19 NOTE — ED PROVIDER NOTE - CLINICAL SUMMARY MEDICAL DECISION MAKING FREE TEXT BOX
I, Sean Nash MD, have seen and examined the patient on the date of service.  I agree with the SUE's assessment as written, with exceptions or additions as noted below or in a separate note. Patient with a past medical history of hypertension dementia was brought in by EMS with concern for possible COVID exposure.  Patient coming from home where multiple family members have COVID.  Family attempted to do a home test with patient today and she does not cooperate so she was sent to ED for evaluation.  Here patient is at baseline mental status.  Denies any complaints of chest pain or shortness of breath.  PA spoke with patient's daughter who wanted patient to be tested.  States that patient took Tylenol earlier today.  On exam patient is awake and alert and oriented to self and location.  She is found to be febrile.  She is not hypoxic here.  She has a soft left inguinal hernia that is nontender.  Assessment and plan: Suspect likely underlying COVID as a cause of her fever.  Pending COVID test, would less likely for an underlying bacterial cause.  Patient well-appearing at this time without evidence of underlying pneumonia.  We will check lab work as well for electrolyte changes or kidney injury.  Will disposition pending work-up here.

## 2023-02-07 NOTE — PROGRESS NOTE ADULT - PROBLEM SELECTOR PLAN 2
Cough in Children: Care Instructions  Overview  A cough is how your child's body responds to something that bothers your child's throat or airways. Many things can cause a cough. Your child might cough because of a cold or the flu, bronchitis, or asthma. Cigarette smoke, postnasal drip, allergies, and stomach acid that backs up into the throat also can cause coughs. A cough is a symptom, not a disease. Most coughs stop when the cause, such as a cold, goes away. You can take a few steps at home to help your child cough less and feel better. Follow-up care is a key part of your child's treatment and safety. Be sure to make and go to all appointments, and call your doctor if your child is having problems. It's also a good idea to know your child's test results and keep a list of the medicines your child takes. How can you care for your child at home? Have your child drink plenty of water and other fluids. This may help soothe a dry or sore throat. Honey or lemon juice in hot water or tea may ease a dry cough. Do not give honey to a child younger than 3year old. It may contain bacteria that are harmful to infants. Be careful with cough and cold medicines. Don't give them to children younger than 6, because they don't work for children that age and can even be harmful. For children 6 and older, always follow all the instructions carefully. Make sure you know how much medicine to give and how long to use it. And use the dosing device if one is included. Keep your child away from smoke. Do not smoke or let anyone else smoke around your child or in your house. Help your child avoid exposure to smoke, dust, or other pollutants, or have your child wear a face mask. Check with your doctor or pharmacist to find out which type of face mask will give your child the most benefit. When should you call for help? Call 911 anytime you think your child may need emergency care.  For example, call if:    Your child has severe trouble breathing. Symptoms may include:  Using the belly muscles to breathe. The chest sinking in or the nostrils flaring when your child struggles to breathe. Your child's skin and fingernails are gray or blue. Your child coughs up large amounts of blood or what looks like coffee grounds. Call your doctor now or seek immediate medical care if:    Your child coughs up blood. Your child has new or worse trouble breathing. Your child has a new or higher fever. Watch closely for changes in your child's health, and be sure to contact your doctor if:    Your child has a new symptom, such as an earache or a rash. Your child coughs more deeply or more often, especially if you notice more mucus or a change in the color of the mucus. Your child does not get better as expected. Where can you learn more? Go to http://www.gray.com/  Enter W569 in the search box to learn more about \"Cough in Children: Care Instructions. \"  Current as of: May 4, 2022               Content Version: 13.4  © 0139-2317 [employer]. Care instructions adapted under license by [employer] (which disclaims liability or warranty for this information). If you have questions about a medical condition or this instruction, always ask your healthcare professional. [address] any warranty or liability for your use of this information. Strep Throat in Children: Care Instructions  Your Care Instructions     Strep throat is a bacterial infection that causes a sudden, severe sore throat. Antibiotics are used to treat strep throat and prevent rare but serious complications. Your child should feel better in a few days. Your child can spread strep throat to others until 24 hours after he or she starts taking antibiotics. Keep your child out of school or day care until 1 full day after he or she starts taking antibiotics.   Follow-up care is a key part of your child's treatment and safety. Be sure to make and go to all appointments, and call your doctor if your child is having problems. It's also a good idea to know your child's test results and keep a list of the medicines your child takes. How can you care for your child at home? Give your child antibiotics as directed. Do not stop using them just because your child feels better. Your child needs to take the full course of antibiotics. Keep your child at home and away from other people for 24 hours after starting the antibiotics. Wash your hands and your child's hands often. Keep drinking glasses and eating utensils separate, and wash these items well in hot, soapy water. Give your child acetaminophen (Tylenol) or ibuprofen (Advil, Motrin) for fever or pain. Be safe with medicines. Read and follow all instructions on the label. Do not give aspirin to anyone younger than 20. It has been linked to Reye syndrome, a serious illness. Do not give your child two or more pain medicines at the same time unless the doctor told you to. Many pain medicines have acetaminophen, which is Tylenol. Too much acetaminophen (Tylenol) can be harmful. Try an over-the-counter anesthetic throat spray or throat lozenges, which may help relieve throat pain. Do not give lozenges to children younger than age 3. If your child is younger than age 3, ask your doctor if you can give your child numbing medicines. Have your child drink lots of water and other clear liquids. Frozen ice treats, ice cream, and sherbet also can make his or her throat feel better. Soft foods, such as scrambled eggs and gelatin dessert, may be easier for your child to eat. Make sure your child gets lots of rest.  Keep your child away from smoke. Smoke irritates the throat. Place a humidifier by your child's bed or close to your child. Follow the directions for cleaning the machine. When should you call for help?    Call your doctor now or seek immediate medical care if:    Your child has a fever with a stiff neck or a severe headache. Your child has any trouble breathing. Your child's fever gets worse. Your child cannot swallow or cannot drink enough because of throat pain. Your child coughs up colored or bloody mucus. Watch closely for changes in your child's health, and be sure to contact your doctor if:    Your child's fever returns after several days of having a normal temperature. Your child has any new symptoms, such as a rash, joint pain, an earache, vomiting, or nausea. Your child is not getting better after 2 days of antibiotics. Where can you learn more? Go to http://www.hernadez.com/  Enter L346 in the search box to learn more about \"Strep Throat in Children: Care Instructions. \"  Current as of: May 4, 2022               Content Version: 13.4  © 9749-0812 [employer]. Care instructions adapted under license by [employer] (which disclaims liability or warranty for this information). If you have questions about a medical condition or this instruction, always ask your healthcare professional. [address] any warranty or liability for your use of this information. - WBC 14, however pt is afebrile. Likely 2/2 reactive stress  - pt without evidence for acute abdomen, denies pain. Abd NT, ND.   - continue to monitor, f/u AM CBC

## 2024-01-12 NOTE — DIETITIAN INITIAL EVALUATION ADULT. - PROBLEM/PLAN-4
1/12/2024      RE: Carri Mckeon  [address]  [facility] [address]       Dear Colleague,    Thank you for the opportunity to participate in the care of your patient, Carri Mckeon, at the [facility] [place] [facility]. Please see a copy of my visit note below.    Heart Failure Cardiology Advanced Heart Failure     HPI    Dear colleagues,     I had the pleasure of seeing Ms. Carri Mckeon in the Heart Failure Cardiology clinic.  As you know, Ms. Carri Mckeon is a pleasant 57 year old female with a past medical history of heart failure with reduced ejection fraction secondary to ischemic cardiomyopathy, now s/p LVAD Heartmate 3 1/21/22.  She follows with my colleagues at [place].  I first met her January 2022.    Essentially patient had repeat ischemic insults since August 2015, STEMI/NSTEMIs, leading to pump failure and heart failure symptoms (see my initial consult note in January 2022).  She also developed functional severe MR and was being considered for MitraClip.  I met her January 2022 and started evaluation for advanced therapies.  She was admitted 1/17/22 and had presented in cardiogenic shock with lactic acidosis and ischemic hepatitis from shock liver.  She required inotropes and intra-aortic balloon pump.  She underwent evaluation and is ultimately decided to proceed with LVAD over transplant at that time given her current state and her pulmonary vascular remodeling. She does have a somewhat smaller cavity size and thus has been on the lower speed on 5200 RPMs.  She did undergo a speed optimization study which confirmed optimal speed of 5200 RPMs.      Patient has a few readmissions since her LVAD placement, largely for noncardiac issues.  They all tend to revolve around community acquired pneumonias for several occurrences.  Subsequently September 2022 she suffered a fall and left tibial and fibular  fracture.  She was managed with nonoperative conservative management with physical therapy and casting.  She spent about 3 weeks in acute rehab unit and was discharged October 29, 2022.  April 2023 she was hospitalized for acute confusion and thought maybe this was due to her pain and sedation outpatient regimen in addition to hypoxia.  She was hospitalized again this last December for respiratory failure and low flows, and when nearing discharge, she fell in the bathroom and fractured her hip.  She was transferred to [facility] and underwent repair.     She is currently in rehab here post hip repair.  Her tibia and fibular fracture have repaired.    She is otherwise quite down and depressed and concerned with her post LVAD course and other complications and hospitalizations that she has had.  She is wondering if elevated hemidiaphragm can be causing her pneumonia and her oxygen needs.  She is currently using 3 L of oxygen since discharge, previously was on 2 L of oxygen.  She will occasionally have some mild swelling of her feet and toes.  Continues to have intercostal nerve pain around the LVAD site.    PAST MEDICAL HISTORY:  Patient Active Problem List   Diagnosis    Acute on chronic combined systolic and diastolic CHF (congestive heart failure) (H)    Arteriosclerosis of coronary artery    Cardiac arrest (H)    Cholecystitis, acute    Drug-seeking behavior    Dyslipidemia    MARGARET (generalized anxiety disorder)    Ischemic cardiomyopathy    Lymphadenopathy, hilar    Multinodular thyroid    Mitral valve mass    Presence of drug coated stent in posterior descending branch of right coronary artery    S/P laparoscopic cholecystectomy    Sacral contusion, initial encounter    ST elevation MI (STEMI) (H)    Stroke (cerebrum) (H)    Tobacco dependence    LVAD (left ventricular assist device) present (H)    Paroxysmal atrial fibrillation (H)    Severe mitral regurgitation by prior echocardiography    Pain at surgical incision     Acute respiratory failure with hypoxia (H)    Pleural effusion on left    Transaminitis    Colitis due to Clostridium difficile    Severe protein-calorie malnutrition (H24)    Seizures (H)    Depression    Chronic systolic congestive heart failure (H)    Tibia/fibula fracture, left, closed, initial encounter    Physical deconditioning    Cerebrovascular accident (CVA), unspecified mechanism (H)    Sepsis (H)    Closed displaced fracture of right femoral neck (H)    S/P right hip fracture    Liver failure (H)    Acute liver failure without hepatic coma      FAMILY HISTORY:  Father with heart disease, mother with cancer, sister with prior DVT/PE    SOCIAL HISTORY:  Former smoker, .25 packs per day for 30 years, quit December 2020.  No alcohol use.  No other illicit substance use.  Formerly worked as a  in [employer]  Currently .  Used to live alone but with her recent health issues has moved in with her sister and her family.  Has 3 children with the same father.  Her other children live in [place].    CURRENT MEDICATIONS:  No current facility-administered medications for this visit.     Current Outpatient Medications   Medication    oxyCODONE (ROXICODONE) 5 MG tablet    warfarin ANTICOAGULANT (COUMADIN) 5 MG tablet     Facility-Administered Medications Ordered in Other Visits   Medication    - Skin Test Reading -    acetaminophen (TYLENOL) tablet 650 mg    ALPRAZolam (XANAX) tablet 0.5 mg    alum & mag hydroxide-simethicone (MAALOX) suspension 30 mL    aspirin EC tablet 81 mg    carvedilol (COREG) tablet 3.125 mg    dapagliflozin (FARXIGA) tablet 10 mg    dicyclomine (BENTYL) capsule 10 mg    gabapentin (NEURONTIN) capsule 200 mg    levETIRAcetam (KEPPRA) tablet 500 mg    Lidocaine (LIDOCARE) 4 % Patch 1 patch    lidocaine (LMX4) cream    lidocaine 1 % 0.1-1 mL    medication instruction    melatonin tablet 5 mg    naloxone (NARCAN) injection 0.2 mg    Or    naloxone (NARCAN) injection 0.4  mg    Or    naloxone (NARCAN) injection 0.2 mg    Or    naloxone (NARCAN) injection 0.4 mg    nitroGLYcerin (NITROSTAT) sublingual tablet 0.4 mg    Nurse may request from Pharmacy a change of form of medication (e.g. Liquid to tablet).    ondansetron (ZOFRAN ODT) ODT tab 4 mg    oxyCODONE (ROXICODONE) tablet 5-10 mg    pantoprazole (PROTONIX) EC tablet 40 mg    polyethylene glycol (MIRALAX) Packet 17 g    rosuvastatin (CRESTOR) tablet 20 mg    sacubitril-valsartan half-tab 12-13 mg    senna-docusate (SENOKOT-S/PERICOLACE) 8.6-50 MG per tablet 1 tablet    sodium chloride (PF) 0.9% PF flush 3 mL    sodium chloride (PF) 0.9% PF flush 3 mL    spironolactone (ALDACTONE) tablet 25 mg    traZODone (DESYREL) tablet 150 mg    tuberculin injection 5 Units    venlafaxine (EFFEXOR XR) 24 hr capsule 225 mg    warfarin ANTICOAGULANT (COUMADIN) tablet 5 mg    Warfarin Dose Required Daily - Pharmacist Managed       EXAM:  BP (!) 74/0 (BP Location: Right arm, Patient Position: Sitting, Cuff Size: Adult Regular)   Wt 64.9 kg (143 lb)   BMI 23.08 kg/m      General: appears comfortable, alert and articulate  Heart: regular, LVAD hum, occasional radial pulse, estimated JVP 8 cm  Lungs: clear, no rales or wheezing  Extremities: no edema. Left leg in boot  neurological: normal speech and affect, no gross motor deficits    Weight  Wt Readings from Last 10 Encounters:   01/12/24 65.3 kg (144 lb)   01/12/24 64.9 kg (143 lb)   12/29/23 62.8 kg (138 lb 8 oz)   12/13/23 64.4 kg (141 lb 14.4 oz)   11/29/23 64 kg (141 lb 3.2 oz)   09/28/23 70.3 kg (155 lb)   09/15/23 68.8 kg (151 lb 9.6 oz)   06/08/23 72.3 kg (159 lb 4.8 oz)   04/27/23 68.3 kg (150 lb 9.6 oz)   03/27/23 66.2 kg (146 lb)     Outside results of note:  Outside records from [facility] via [facility] were obtained, and relevant results/notes have been incorporated into HPI.    Echocardiogram 12/13/21: LV EF 20%, LVIDD 6.2 cm severe global hypokinesis, severe MR due to  multiple jets, roque I, flow reversal pulmonary veins, normal RV size but reduced systolic function, TAPSE 11 mm, S' 7    Coronary Angiogram 10/24/21: MIKA/PCI to pRCA, mRCA, OM1.  The coronary circulation is right dominant.     Left main artery: The segment is large. Angiography shows mild atherosclerosis.     Left anterior descending artery: The segment is large. Proximal left anterior descending: The previously placed stent is patent. Mid left anterior descending: There is a 30 % stenosis.     Circumflex artery: The segment is large. Angiography shows mild atherosclerosis. First obtuse marginal: The segment is large. There is an 80 % stenosis.     Right coronary artery: The segment is large. Proximal right coronary artery: There is a 70 % stenosis. Mid right coronary artery: There is a 90 % stenosis.       Tests I personally reviewed    Echocardiogram 2/24/2022 (personally reviewed): Speed optimization LVAD.  5200 RPM, LV IDD 4.6 cm, RV normal in size but mildly reduced function, midline septum, aortic valve opens minimally each beat, trace MR    Echocardiogram 4/23/23:  Normal LV size.  Septum normal.  Aortic valve open minimally with each systole. No AI.  Flow velocity not assessed.  No pericardial effusion is present.    Right heart catheterization 4/24/2023:   RA --/--/8   RV 30/8  PA 30/12/18   PCW --/--/10   Jemma CO 4   Jemma CI 2.2   TD CO 4  TD CI 2.2   PA sat 63.2%   Hgb 13g/dL   PVR 2    Right Heart catheterization 12/26/23:  RA- 4/3/2 mm Hg  RV- 19/4 mm Hg  PCWP- 3/4/2 mm Hg  PA- 21/5/11 mm Hg  PA SAT- 62.5%  JEMMA CO- 4.1 L/min, JEMMA CI- 2.4 L/min/m2     Echocardiogram 12/26/23:  Interpretation Summary  Technically difficult study.Extremely poor acoustic windows.     LVAD HM3 5200 RPM  Left ventricular function is severely reduced. The ejection fraction is 5 to 10%% (severely reduced).  Global right ventricular function is moderately reduced.  Normal Doppler interrogation of the LVAD inflow cannula  and outflow graft.  The inferior vena cava was normal in size with preserved respiratory variability.  Aortic valve cannot be evaluated.  No pericardial effusion is present.    Assessment and Plan:     In summary, this is a very pleasant 57-year-old female with severe heart failure with reduced ejection fraction secondary to ischemic cardiomyopathy, status post LVAD HeartMate 3 2022.    Concern about her continued shortness of breath, pneumonias, and lung physiology despite normal cardiac hemodynamics, on good guideline directed medical therapy for heart failure, stable LVAD parameters.  She thinks it is an elevated hemidiaphragm that is causing all her problems, which I do think contributes but not the sole cause.  We will have her follow-up with her pulmonologist Dr. Florentino Avila here at the [facility].    She also has repeated bone fractures, L tibia/fibula, and R hip.  Should have her see endocrine for potential osteoporosis treatment    My concern has been that these issues would worsen post transplant, given immunosuppression.  I told her we have not transplanted someone on chronic long term oxygen.  At this moment I am not looking to transplant her.    HFrEF, ICM, ACC/AHA D, NYHA II  carvedilol 3.125 mg twice daily  entresto  BID  spironolactone 25 mg daily  Dapagliflozin 10 mg daily  warfarin for INR goal 2-3  LDH stable  Off diuretic  Can stop aspirin (LIMA trial)    HM 3 interrogation in clinic. Some PI events, no major alarms  Heartmate 3 LEFT VS  Flow (Lpm): 3.9 Lpm (3.7-4)  Pulse Index (PI): 4 PI (3.7-9)  Speed (rpm): 5200 rpm  Power (arthur): 3.7 arthur  Current Hct settin    Paroxysmal Atrial fibrillation, on medications as above    Coronary artery disease, medications as above, rosuvastatin 20 mg a day    Follow up 3 months with SAMAN, 6 months with me    Jonathan Smith MD   of Medicine  Advanced Heart Failure and Transplant Cardiology    Today's clinic visit  entailed:  40 minutes spent on the date of the encounter doing chart review, history and exam, documentation and further activities per the note  The level of medical decision making during this visit was of high complexity.     The longitudinal plan of care for heart failure and LVAD was addressed during this visit. Due to the added complexity in care, I will continue to support Carri Mckeon in the subsequent management of this  condition(s) and with the ongoing continuity of care of this condition(s)         Please do not hesitate to contact me if you have any questions/concerns.     Sincerely,     Jonathan Smith MD   DISPLAY PLAN FREE TEXT

## 2024-02-24 ENCOUNTER — INPATIENT (INPATIENT)
Facility: HOSPITAL | Age: 89
LOS: 5 days | Discharge: HOME | DRG: 480 | End: 2024-03-01
Attending: FAMILY MEDICINE | Admitting: INTERNAL MEDICINE
Payer: MEDICARE

## 2024-02-24 ENCOUNTER — TRANSCRIPTION ENCOUNTER (OUTPATIENT)
Age: 89
End: 2024-02-24

## 2024-02-24 VITALS
TEMPERATURE: 99 F | RESPIRATION RATE: 17 BRPM | HEART RATE: 85 BPM | SYSTOLIC BLOOD PRESSURE: 99 MMHG | DIASTOLIC BLOOD PRESSURE: 63 MMHG | WEIGHT: 95.02 LBS | OXYGEN SATURATION: 94 %

## 2024-02-24 DIAGNOSIS — Z90.49 ACQUIRED ABSENCE OF OTHER SPECIFIED PARTS OF DIGESTIVE TRACT: Chronic | ICD-10-CM

## 2024-02-24 DIAGNOSIS — Z86.69 PERSONAL HISTORY OF OTHER DISEASES OF THE NERVOUS SYSTEM AND SENSE ORGANS: Chronic | ICD-10-CM

## 2024-02-24 DIAGNOSIS — S72.001A FRACTURE OF UNSPECIFIED PART OF NECK OF RIGHT FEMUR, INITIAL ENCOUNTER FOR CLOSED FRACTURE: ICD-10-CM

## 2024-02-24 DIAGNOSIS — T14.8XXA OTHER INJURY OF UNSPECIFIED BODY REGION, INITIAL ENCOUNTER: ICD-10-CM

## 2024-02-24 DIAGNOSIS — F03.90 UNSPECIFIED DEMENTIA, UNSPECIFIED SEVERITY, WITHOUT BEHAVIORAL DISTURBANCE, PSYCHOTIC DISTURBANCE, MOOD DISTURBANCE, AND ANXIETY: ICD-10-CM

## 2024-02-24 DIAGNOSIS — D72.829 ELEVATED WHITE BLOOD CELL COUNT, UNSPECIFIED: ICD-10-CM

## 2024-02-24 DIAGNOSIS — Z90.710 ACQUIRED ABSENCE OF BOTH CERVIX AND UTERUS: Chronic | ICD-10-CM

## 2024-02-24 DIAGNOSIS — Z29.9 ENCOUNTER FOR PROPHYLACTIC MEASURES, UNSPECIFIED: ICD-10-CM

## 2024-02-24 LAB
ANION GAP SERPL CALC-SCNC: 7 MMOL/L — SIGNIFICANT CHANGE UP (ref 5–17)
APTT BLD: 31.2 SEC — SIGNIFICANT CHANGE UP (ref 24.5–35.6)
BUN SERPL-MCNC: 21 MG/DL — SIGNIFICANT CHANGE UP (ref 7–23)
CALCIUM SERPL-MCNC: 9.1 MG/DL — SIGNIFICANT CHANGE UP (ref 8.5–10.1)
CHLORIDE SERPL-SCNC: 111 MMOL/L — HIGH (ref 96–108)
CO2 SERPL-SCNC: 29 MMOL/L — SIGNIFICANT CHANGE UP (ref 22–31)
CREAT SERPL-MCNC: 0.71 MG/DL — SIGNIFICANT CHANGE UP (ref 0.5–1.3)
EGFR: 77 ML/MIN/1.73M2 — SIGNIFICANT CHANGE UP
GLUCOSE SERPL-MCNC: 148 MG/DL — HIGH (ref 70–99)
HCT VFR BLD CALC: 40.3 % — SIGNIFICANT CHANGE UP (ref 34.5–45)
HGB BLD-MCNC: 13.5 G/DL — SIGNIFICANT CHANGE UP (ref 11.5–15.5)
INR BLD: 1.04 RATIO — SIGNIFICANT CHANGE UP (ref 0.85–1.18)
MCHC RBC-ENTMCNC: 32.1 PG — SIGNIFICANT CHANGE UP (ref 27–34)
MCHC RBC-ENTMCNC: 33.5 GM/DL — SIGNIFICANT CHANGE UP (ref 32–36)
MCV RBC AUTO: 95.7 FL — SIGNIFICANT CHANGE UP (ref 80–100)
NRBC # BLD: 0 /100 WBCS — SIGNIFICANT CHANGE UP (ref 0–0)
PLATELET # BLD AUTO: 153 K/UL — SIGNIFICANT CHANGE UP (ref 150–400)
POTASSIUM SERPL-MCNC: 3.9 MMOL/L — SIGNIFICANT CHANGE UP (ref 3.5–5.3)
POTASSIUM SERPL-SCNC: 3.9 MMOL/L — SIGNIFICANT CHANGE UP (ref 3.5–5.3)
PROTHROM AB SERPL-ACNC: 12.2 SEC — SIGNIFICANT CHANGE UP (ref 9.5–13)
RBC # BLD: 4.21 M/UL — SIGNIFICANT CHANGE UP (ref 3.8–5.2)
RBC # FLD: 12.7 % — SIGNIFICANT CHANGE UP (ref 10.3–14.5)
SODIUM SERPL-SCNC: 147 MMOL/L — HIGH (ref 135–145)
WBC # BLD: 11.12 K/UL — HIGH (ref 3.8–10.5)
WBC # FLD AUTO: 11.12 K/UL — HIGH (ref 3.8–10.5)

## 2024-02-24 PROCEDURE — 72125 CT NECK SPINE W/O DYE: CPT | Mod: 26,MC

## 2024-02-24 PROCEDURE — 70450 CT HEAD/BRAIN W/O DYE: CPT | Mod: 26,MC

## 2024-02-24 PROCEDURE — 99223 1ST HOSP IP/OBS HIGH 75: CPT | Mod: GC

## 2024-02-24 PROCEDURE — 99285 EMERGENCY DEPT VISIT HI MDM: CPT | Mod: FS

## 2024-02-24 PROCEDURE — 73590 X-RAY EXAM OF LOWER LEG: CPT | Mod: 26,LT

## 2024-02-24 PROCEDURE — 73590 X-RAY EXAM OF LOWER LEG: CPT | Mod: 26,RT,77

## 2024-02-24 PROCEDURE — 73552 X-RAY EXAM OF FEMUR 2/>: CPT | Mod: 26,RT

## 2024-02-24 PROCEDURE — 73700 CT LOWER EXTREMITY W/O DYE: CPT | Mod: 26,LT

## 2024-02-24 PROCEDURE — 73502 X-RAY EXAM HIP UNI 2-3 VIEWS: CPT | Mod: 26,RT

## 2024-02-24 PROCEDURE — 73562 X-RAY EXAM OF KNEE 3: CPT | Mod: 26,RT

## 2024-02-24 PROCEDURE — 72192 CT PELVIS W/O DYE: CPT | Mod: 26,MC

## 2024-02-24 PROCEDURE — 93010 ELECTROCARDIOGRAM REPORT: CPT

## 2024-02-24 PROCEDURE — 76376 3D RENDER W/INTRP POSTPROCES: CPT | Mod: 26,77

## 2024-02-24 PROCEDURE — 76376 3D RENDER W/INTRP POSTPROCES: CPT | Mod: 26

## 2024-02-24 PROCEDURE — 71045 X-RAY EXAM CHEST 1 VIEW: CPT | Mod: 26

## 2024-02-24 RX ORDER — SODIUM CHLORIDE 9 MG/ML
1000 INJECTION INTRAMUSCULAR; INTRAVENOUS; SUBCUTANEOUS
Refills: 0 | Status: DISCONTINUED | OUTPATIENT
Start: 2024-02-24 | End: 2024-02-25

## 2024-02-24 RX ORDER — LANOLIN ALCOHOL/MO/W.PET/CERES
1 CREAM (GRAM) TOPICAL
Refills: 0 | DISCHARGE

## 2024-02-24 RX ORDER — OLANZAPINE 15 MG/1
5 TABLET, FILM COATED ORAL ONCE
Refills: 0 | Status: COMPLETED | OUTPATIENT
Start: 2024-02-24 | End: 2024-02-24

## 2024-02-24 RX ORDER — ACETAMINOPHEN 500 MG
650 TABLET ORAL ONCE
Refills: 0 | Status: COMPLETED | OUTPATIENT
Start: 2024-02-24 | End: 2024-02-24

## 2024-02-24 RX ORDER — LANOLIN ALCOHOL/MO/W.PET/CERES
3 CREAM (GRAM) TOPICAL AT BEDTIME
Refills: 0 | Status: DISCONTINUED | OUTPATIENT
Start: 2024-02-24 | End: 2024-02-25

## 2024-02-24 RX ORDER — ASPIRIN/CALCIUM CARB/MAGNESIUM 324 MG
1 TABLET ORAL
Qty: 0 | Refills: 0 | DISCHARGE

## 2024-02-24 RX ADMIN — Medication 1 MILLIGRAM(S): at 17:44

## 2024-02-24 RX ADMIN — Medication 260 MILLIGRAM(S): at 20:17

## 2024-02-24 RX ADMIN — OLANZAPINE 5 MILLIGRAM(S): 15 TABLET, FILM COATED ORAL at 13:55

## 2024-02-24 RX ADMIN — OLANZAPINE 5 MILLIGRAM(S): 15 TABLET, FILM COATED ORAL at 13:18

## 2024-02-24 NOTE — H&P ADULT - NSICDXPASTMEDICALHX_GEN_ALL_CORE_FT
PAST MEDICAL HISTORY:  Dementia     Hallucinations Auditory and visual (per daughter)    Hiatal hernia     HTN (hypertension)     Left inguinal hernia

## 2024-02-24 NOTE — H&P ADULT - PROBLEM SELECTOR PLAN 3
- large hematoma on left lower leg  - continue to monitor - large hematoma on left lower leg  - FU CT tib/fib  - continue to monitor

## 2024-02-24 NOTE — H&P ADULT - ASSESSMENT
89-year-old female past medical history of severe advanced dementia, HTN, presented to ED brought in by her daughter for right hip pain and left leg hematoma s/p a mechanical fall today. Admit intertrochanteric fracture of the right proximal femur.

## 2024-02-24 NOTE — PATIENT PROFILE ADULT - FALL HARM RISK - HARM RISK INTERVENTIONS
Assistance with ambulation/Assistance OOB with selected safe patient handling equipment/Communicate Risk of Fall with Harm to all staff/Discuss with provider need for PT consult/Monitor for mental status changes/Monitor gait and stability/Move patient closer to nurses' station/Provide patient with walking aids - walker, cane, crutches/Reinforce activity limits and safety measures with patient and family/Reorient to person, place and time as needed/Tailored Fall Risk Interventions/Toileting schedule using arm’s reach rule for commode and bathroom/Use of alarms - bed, chair and/or voice tab/Visual Cue: Yellow wristband and red socks/Bed in lowest position, wheels locked, appropriate side rails in place/Call bell, personal items and telephone in reach/Instruct patient to call for assistance before getting out of bed or chair/Non-slip footwear when patient is out of bed/[place] to call system/Physically safe environment - no spills, clutter or unnecessary equipment/Purposeful Proactive Rounding/Room/bathroom lighting operational, light cord in reach

## 2024-02-24 NOTE — ED PROVIDER NOTE - ATTENDING APP SHARED VISIT CONTRIBUTION OF CARE
I, Sean Nash MD, have seen and examined the patient on the date of service.  I agree with the SUE's assessment as written, with exceptions or additions as noted below or in a separate note.

## 2024-02-24 NOTE — ED ADULT NURSE NOTE - NSFALLHARMRISKINTERV_ED_ALL_ED
Assistance OOB with selected safe patient handling equipment if applicable/Assistance with ambulation/Communicate risk of Fall with Harm to all staff, patient, and family/Monitor gait and stability/Monitor for mental status changes and reorient to person, place, and time, as needed/Move patient closer to nursing station/within visual sight of ED staff/Provide visual cue: red socks, yellow wristband, yellow gown, etc/Reinforce activity limits and safety measures with patient and family/Toileting schedule using arm’s reach rule for commode and bathroom/Use of alarms - bed, stretcher, chair and/or video monitoring/Bed in lowest position, wheels locked, appropriate side rails in place/Call bell, personal items and telephone in reach/Instruct patient to call for assistance before getting out of bed/chair/stretcher/Non-slip footwear applied when patient is off stretcher/[place] to call system/Physically safe environment - no spills, clutter or unnecessary equipment/Purposeful Proactive Rounding/Room/bathroom lighting operational, light cord in reach

## 2024-02-24 NOTE — H&P ADULT - NSICDXFAMILYHX_GEN_ALL_CORE_FT
FAMILY HISTORY:  Father  Still living? Unknown  FH: CVA (cerebrovascular accident), Age at diagnosis: Age Unknown  FH: HTN (hypertension), Age at diagnosis: Age Unknown  FH: liver cancer, Age at diagnosis: Age Unknown

## 2024-02-24 NOTE — H&P ADULT - ATTENDING COMMENTS
89-year-old female past medical history of severe advanced dementia, HTN, presented to ED brought in by her daughter for right hip pain and left leg hematoma s/p a mechanical fall today. Admit intertrochanteric fracture of the right proximal femur.    HPI as above.     T(C): 36.9 (02-24-24 @ 21:25), Max: 37.1 (02-24-24 @ 19:33)  HR: 87 (02-24-24 @ 21:25) (85 - 87)  BP: 99/67 (02-24-24 @ 21:25) (99/63 - 99/67)  RR: 16 (02-24-24 @ 21:25) (16 - 17)  SpO2: 97% (02-24-24 @ 21:25) (94% - 97%)  Wt(kg): --    Physical Exam:   GENERAL: NAD  HEENT: head NC/AT; conjunctiva & sclera clear;  moist mucous membranes  NECK: supple, no JVD  RESPIRATORY: CTA B/L, no wheezing, rales, rhonchi or rubs  CARDIOVASCULAR: S1&S2, RRR, no murmurs or gallops  ABDOMEN: soft, non-tender, non-distended, + Bowel sounds x4 quadrants, no guarding, rebound or rigidity  MUSCULOSKELETAL:  no clubbing, cyanosis of all 4 extremities. LLE hematoma at shin  LYMPH: no cervical lymphadenopathy  VASCULAR: Radial pulses 2+ bilaterally, no varicose veins   SKIN: warm and dry, color normal  NEUROLOGIC: A and O x1, MAEx4    Plan:   Patient is medically optimized for orthopedic surgery if clinically indicated. Patient has no modifiable risk factors at this time. No evidence of heart failure on exam. EKG reviewed and shows sinus arrythmia, no acute ST-T wave changes. HR 77, QTc 434    CARDIAC OPTIMIZATION IS PENDING AND DOCUMENTED SEPARATELY.

## 2024-02-24 NOTE — H&P ADULT - PROBLEM SELECTOR PLAN 2
- chronic problem  - baseline is speaking but AOx1 per daughter  - pt previously walked unassisted  - pt eats regular diet at home  - s/p zyprexa 5 mg x2 and Ativan 1 mg in ED  - FU EKG to monitor QTc  - home aide will stay at bedside overnight - chronic problem  - baseline is speaking but AOx1 per daughter  - pt previously walked unassisted  - pt eats regular diet at home  - s/p zyprexa 5 mg x2 and Ativan 1 mg in ED  - home aide will stay at bedside overnight

## 2024-02-24 NOTE — H&P ADULT - NSHPPHYSICALEXAM_GEN_ALL_CORE
Vital Signs Last 24 Hrs  T(C): 37.1 (24 Feb 2024 19:33), Max: 37.1 (24 Feb 2024 19:33)  T(F): 98.7 (24 Feb 2024 19:33), Max: 98.7 (24 Feb 2024 19:33)  HR: 85 (24 Feb 2024 19:33) (85 - 85)  BP: 99/63 (24 Feb 2024 19:33) (99/63 - 99/63)  BP(mean): --  RR: 17 (24 Feb 2024 19:33) (17 - 17)  SpO2: 94% (24 Feb 2024 19:33) (94% - 94%)    Parameters below as of 24 Feb 2024 19:33  Patient On (Oxygen Delivery Method): room air    CONSTITUTIONAL: somnolent, no acute distress  HEENT: Atraumatic normocephalic. Moist mucous membranes.    RESP: No respiratory distress; CTA b/l, no WRR  CV: RRR, +S1S2, no MRG  GI: Soft, nontender, nondistended, no rebound or guarding  MSK: Moving all four extremities spontaneously. No peripheral edema. No calf tenderness.  SKIN: Warm and dry. + large hematoma left lateral lower leg  NEURO: somnolent, speaking, not following commands  PSYCH: unable to obtain 2/2 mental status

## 2024-02-24 NOTE — H&P ADULT - HISTORY OF PRESENT ILLNESS
89-year-old female past medical history of severe advanced dementia, HTN, presented to ED brought in by her daughter for right hip pain and left leg hematoma s/p a mechanical fall today. Unable to obtain history from pt as she received zyprexa and ativan prior to exam, history provided by daughter. At 11:30 today the pt was walking in the dining room and tripped on the table leg, falling on her right side. The daughter helped her up, but the pt was complaining of right leg pain and was unable to walk. Daughter denies head trauma. At baseline the pt is AOx1 and walks unassisted. The pt also hit her left leg on the table leg, and now has large hematoma there. Denies anticoagulation use.     ED COURSE:  Vitals: T 98.7, HR 85, BP 99/63, RR 17, SpO2 94% on RA  Labs significant for: WBC 11.12, INR 1.04, Na 147,   Imaging:  CT HEAD: Mild periventricular white matter ischemia. Global atrophy most prominent in the BILATERAL temporal lobes. Chronic LEFT maxillary sinusitis.  CT cervical spine:   No vertebral fracture is recognized.  Straightening on a degenerative basis with grade 1 anterior spondylolisthesis at C7-T1 on a degenerative basis.   advanced degenerative disc disease and spondylosis at C5-6 and C6-7 with loss of disc height and associated degenerative endplate changes. Narrowing of the LEFT C4-5, BILATERAL C5-6 and C6/7 neural foramina due to uncovertebral spurring and facet osteophytic hypertrophy. Posterior osteophytic ridge/disc complexes at C5-6 and C6-7 flatten the ventral thecal sac  CT pelvis: Mild varus angulation of an intertrochanteric fracture of the right proximal femur.  EKG: pending  Pt received: zyprexa 5 mg x2, Ativan 1 mg x1 89-year-old female past medical history of severe advanced dementia, HTN, presented to ED brought in by her daughter for right hip pain and left leg hematoma s/p a mechanical fall today. Unable to obtain history from pt as she received zyprexa and ativan prior to exam, history provided by daughter. At 11:30 today the pt was walking in the dining room and tripped on the table leg, falling on her right side. The daughter helped her up, but the pt was complaining of right leg pain and was unable to walk. Daughter denies head trauma. At baseline the pt is AOx1 and walks unassisted. The pt also hit her left leg on the table leg, and now has large hematoma there. Denies anticoagulation use.   Daughter denies hx reactions to anesthesia.     ED COURSE:  Vitals: T 98.7, HR 85, BP 99/63, RR 17, SpO2 94% on RA  Labs significant for: WBC 11.12, INR 1.04, Na 147,   Imaging:  CT HEAD: Mild periventricular white matter ischemia. Global atrophy most prominent in the BILATERAL temporal lobes. Chronic LEFT maxillary sinusitis.  CT cervical spine:   No vertebral fracture is recognized.  Straightening on a degenerative basis with grade 1 anterior spondylolisthesis at C7-T1 on a degenerative basis.   advanced degenerative disc disease and spondylosis at C5-6 and C6-7 with loss of disc height and associated degenerative endplate changes. Narrowing of the LEFT C4-5, BILATERAL C5-6 and C6/7 neural foramina due to uncovertebral spurring and facet osteophytic hypertrophy. Posterior osteophytic ridge/disc complexes at C5-6 and C6-7 flatten the ventral thecal sac  CT pelvis: Mild varus angulation of an intertrochanteric fracture of the right proximal femur.  EKG: pending  Pt received: zyprexa 5 mg x2, Ativan 1 mg x1 89-year-old female past medical history of severe advanced dementia, HTN, presented to ED brought in by her daughter for right hip pain and left leg hematoma s/p a mechanical fall today. Unable to obtain history from pt as she received zyprexa and ativan prior to exam, history provided by daughter. At 11:30 today the pt was walking in the dining room and tripped on the table leg, falling on her right side. The daughter helped her up, but the pt was complaining of right leg pain and was unable to walk. Daughter denies head trauma. At baseline the pt is AOx1 and walks unassisted. The pt also hit her left leg on the table leg, and now has large hematoma there. Denies anticoagulation use.   Daughter denies hx reactions to anesthesia.     ED COURSE:  Vitals: T 98.7, HR 85, BP 99/63, RR 17, SpO2 94% on RA  Labs significant for: WBC 11.12, INR 1.04, Na 147,   Imaging:  CT HEAD: Mild periventricular white matter ischemia. Global atrophy most prominent in the BILATERAL temporal lobes. Chronic LEFT maxillary sinusitis.  CT cervical spine:   No vertebral fracture is recognized.  Straightening on a degenerative basis with grade 1 anterior spondylolisthesis at C7-T1 on a degenerative basis.   advanced degenerative disc disease and spondylosis at C5-6 and C6-7 with loss of disc height and associated degenerative endplate changes. Narrowing of the LEFT C4-5, BILATERAL C5-6 and C6/7 neural foramina due to uncovertebral spurring and facet osteophytic hypertrophy. Posterior osteophytic ridge/disc complexes at C5-6 and C6-7 flatten the ventral thecal sac  CT pelvis: Mild varus angulation of an intertrochanteric fracture of the right proximal femur.  Pt received: zyprexa 5 mg x2, Ativan 1 mg x1

## 2024-02-24 NOTE — ED PROVIDER NOTE - OBJECTIVE STATEMENT
89-year-old female past medical history of severe advanced dementia was brought in by her daughter for right hip pain and left leg hematoma s/p a mechanical fall today.  Daughter reports patient is agitated at baseline but there is no change in her mental status and is not why she brought her in despite triage note.  Reports she brought her in for evaluation of the fall.  Daughter reports that when they tried to get her up to walk she complained of the right hip pain.  No head trauma reported, but daughter is unsure, reports she had her back turned when patient fell.  No anticoagulation use reported.  No other injuries reported.

## 2024-02-24 NOTE — CONSULT NOTE ADULT - SUBJECTIVE AND OBJECTIVE BOX
Fifi Historian. Patient is a 89yFemale ambulator without assistive devices who presents to ED w/ a c/o of R hip pain s/p MF this am. Per daughter, who is at bedside, denies HT/LOC. States inability to walk immediately following the injury. elsewhere. Lives at home with daughter. PMH: Severe Dementia, auditory/visual hallucinations.     T(C): --  HR: --  BP: --  RR: --  SpO2: --        No pertinent family history in first degree relatives    Prior    HTN (hypertension)    Hallucinations    Left inguinal hernia    History of cholecystectomy    History of total abdominal hysterectomy    History of retinal detachment    AMS    90+    [email or web address]        Gen: NAD, Resting comfortably    R/L LE:  Skin intact, no erythema or ecchymosis  Externally and shortened leg  TTP by hip, nowhere else along RLE  Motor: + EHL/FHL/TA/GSC  Sensory: +SILT: SPN/DPN/JAKY/SAPH/TIB  Compartments soft and compressible  No calf tenderness  + DP    Secondary Assessment:  NC/AT, NTTP of clavicles, NTTP of C-,T-,L-Spine,  UEs: NTTP of Shoulders, Elbows, Wrists, Hands; NT with AROM/PROM of Shoulders, Elbows, Wrists, Hands; AIN/PIN/Med/Uln/Msc/Rad/Ax intact  L/R LE: Able to SLR, NT with Log Roll, NT with Heel Strike, NTTP of Hip, Knee, Ankle, foot; NT with AROM/PROM of Hip, Knee, Ankle, foot; Q/H/Gsc/TA/EHL/FHL intact    Imaging:  XR L/R Hip/Femur:   CT Bony Pelvis:    A/P: 89yFemale with     Plan for x tomorrow  NPO after midnight, except medications  IVF while NPO  One dose of Heparin tonight, then hold chemical DVT ppx for surgery  Please document necessary medical optimizations for surgery   Pain Control As Needed   Ice hip as tolerated  NWB, Strict Bed Rest  SCDs while in bed   Medical recommendations appreciated     Discussed with attending who is in agreement with above plan       Poor Historian. Patient is a 89yFemale ambulator without assistive devices who presents to ED w/ a c/o of R hip pain s/p MF this am. Per daughter, who is at bedside, denies HS/LOC. States inability to walk immediately following the injury. elsewhere. Lives at home with daughter. PMH: Severe Dementia, auditory/visual hallucinations. Not on blood thinners.     T(C): --  HR: --  BP: --  RR: --  SpO2: --        No pertinent family history in first degree relatives    Prior    HTN (hypertension)    Hallucinations    Left inguinal hernia    History of cholecystectomy    History of total abdominal hysterectomy    History of retinal detachment    AMS    90+    [email or web address]        Gen: AAOx1, agitated and not following command    RLE:  Skin intact  Ecchymosis to pretibial region  Externally and shortened leg  TTP by hip  Grossly moving extremities  Compartments soft and compressible  No calf tenderness  + DP    Secondary Assessment:  NC/AT, NTTP of clavicles, NTTP of C-,T-,L-Spine,  UEs: grossly moving extremities   LLE: Grossly moving extremity, with pretibial ecchymosis and hematoma    Imaging:  XR R Hip: IT Fx, personal read  XR R Femur/Knee/Tibfib: pending  XR L Tib/Fib: Negative, personal read     CT Bony Pelvis: R IT Fx   CT L Tib/Fib: pending    A/P: 89yFemale with a R IT Fx. Patient is a poor historian and AAOx1. Partially able to follow command. Discussed with Daughter (Opal), who is at bedside, and wishes to proceed with surgery.     Plan for OR Tomorrow with Dr. Novoa for a R IMN  NPO after midnight, except medications  IVF while NPO  Hold DVT ppx given LLE hematoma   FU CT L Tib/Fib  FU pending XRays   Please document necessary medical optimizations for surgery  FU Need for Cards   Pain Control As Needed   Ice hip as tolerated  NWB, Strict Bed Rest  SCDs while in bed   Medical recommendations appreciated     Discussed with attending who is in agreement with above plan

## 2024-02-24 NOTE — ED PROVIDER NOTE - CLINICAL SUMMARY MEDICAL DECISION MAKING FREE TEXT BOX
I, Sean Nash MD, have seen and examined the patient on the date of service.  I agree with the SUE's assessment as written, with exceptions or additions as noted below or in a separate note. pt with pmh of dementia, htn, who is ambulatory at baseline is here after a fall. despite triage note stating agitation, per daughter, pt is at baseline mental status but in fact here because of a fall. while walking today, she slipped and fell and hit her right hip. unclear LOC. pt not known to be on ac. on exam, no evidence of head trauma. she has right hip TTP and left anterior shin TTP with large hematoma present. concern for trauma such as intracranial injury, hip fx and tibia injury. hx not consistent with syncopal fall. due to dementia hx, pt will need medications to assist with anxiolysis and workup.

## 2024-02-24 NOTE — H&P ADULT - NSHPSOCIALHISTORY_GEN_ALL_CORE
Tobacco: Denies  EtOH: Denies   Recreational drug use: Denies  Lives with: daughter  Ambulates: unassisted   ADLs: fully dependent

## 2024-02-24 NOTE — ED PROVIDER NOTE - PHYSICAL EXAMINATION
Gen: Well appearing in NAD.   Eyes: PERRLA  Head: atraumatic  Heart: s1/s2, RRR  Lung: CTA b/l,   Abd: soft, NT/ND  Msk: No bruising of the right hip noted. Pt moving her hips/legs without pain.     Neuro: Awake and alert, patient moving all extremity equally   Skin: +hematoma on the left anterior tibia    Psych: pt agitated at baseline

## 2024-02-24 NOTE — ED PROVIDER NOTE - PROGRESS NOTE DETAILS
Pt agitated and uncooperative in the ED requiring sedation for imaging FAMILIA Wright: hip fx noted, Ortho evaluated pt, daughter at bedside is agreeable for surgery tomorrow.  Spoke with hospitalist will admit

## 2024-02-24 NOTE — ED ADULT NURSE NOTE - NSICDXPASTMEDICALHX_GEN_ALL_CORE_FT
How Severe Is Your Skin Lesion?: mild Have Your Skin Lesions Been Treated?: not been treated Is This A New Presentation, Or A Follow-Up?: Skin Lesions PAST MEDICAL HISTORY:  Hallucinations Auditory and visual (per daughter)    HTN (hypertension)     Left inguinal hernia

## 2024-02-24 NOTE — ED ADULT NURSE NOTE - OBJECTIVE STATEMENT
BIB EMS with daughter who is healthcare proxy for witnessed trip and fall and pain to right hip, hematoma to left lower leg. daughter states she was unable to get up after fall ( walks at baseline unassisted.). denies head strike. pt is agitated with hx of dementia. daughter states this is baseline mental status. triage unable to obtain Vitals for agitation. not on anticoagulation. no bruise noted to right hip.

## 2024-02-24 NOTE — H&P ADULT - PROBLEM SELECTOR PLAN 1
- pt presents s/p mechanical fall  - CT pelvis: Mild varus angulation of an intertrochanteric fracture of the right proximal femur.  - pain regimen: IV tylenol prn for mild pain. daughter requests avoiding morphine/opiates.  - ice to area  - strict bedrest  - NPO after midnight  - Ortho consulted, plan for OR in AM with Dr. Novoa for a R IMN - pt presents s/p mechanical fall  - CT pelvis: Mild varus angulation of an intertrochanteric fracture of the right proximal femur.  - pain regimen: IV tylenol prn for mild pain. daughter requests avoiding morphine/opiates.  - ice to area  - strict bedrest  - NPO after midnight  - Ortho consulted, plan for OR in AM with Dr. Novoa for a R IMN  - daughter denies hx anesthesia reactions  - METS limited based on functional status  - RCRI score: Class I risk, 3.9 % 30-day risk of death, MI, or cardiac arrest  - Cardio consulted for cardiac clearance, Dr. Mckeon covering for Dr. Hernández - pt presents s/p mechanical fall  - CT pelvis: Mild varus angulation of an intertrochanteric fracture of the right proximal femur.  - pain regimen: IV tylenol prn for mild pain. daughter requests avoiding morphine/opiates.  - ice to area  - strict bedrest  - NPO after midnight  - Ortho consulted, plan for OR in AM with Dr. Novoa for a R IMN  - daughter denies hx anesthesia reactions  - METS limited based on functional status  - RCRI score: Class I risk, 3.9 % 30-day risk of death, MI, or cardiac arrest  - medical clearance pending EKG  - Cardio consulted for cardiac clearance, Dr. Mckeon covering for Dr. Hernández

## 2024-02-25 ENCOUNTER — RESULT REVIEW (OUTPATIENT)
Age: 89
End: 2024-02-25

## 2024-02-25 ENCOUNTER — TRANSCRIPTION ENCOUNTER (OUTPATIENT)
Age: 89
End: 2024-02-25

## 2024-02-25 LAB
ANION GAP SERPL CALC-SCNC: 5 MMOL/L — SIGNIFICANT CHANGE UP (ref 5–17)
ANION GAP SERPL CALC-SCNC: 6 MMOL/L — SIGNIFICANT CHANGE UP (ref 5–17)
APTT BLD: 31.2 SEC — SIGNIFICANT CHANGE UP (ref 24.5–35.6)
BUN SERPL-MCNC: 16 MG/DL — SIGNIFICANT CHANGE UP (ref 7–23)
BUN SERPL-MCNC: 20 MG/DL — SIGNIFICANT CHANGE UP (ref 7–23)
CALCIUM SERPL-MCNC: 8.3 MG/DL — LOW (ref 8.5–10.1)
CALCIUM SERPL-MCNC: 8.6 MG/DL — SIGNIFICANT CHANGE UP (ref 8.5–10.1)
CHLORIDE SERPL-SCNC: 110 MMOL/L — HIGH (ref 96–108)
CHLORIDE SERPL-SCNC: 113 MMOL/L — HIGH (ref 96–108)
CO2 SERPL-SCNC: 29 MMOL/L — SIGNIFICANT CHANGE UP (ref 22–31)
CO2 SERPL-SCNC: 31 MMOL/L — SIGNIFICANT CHANGE UP (ref 22–31)
CREAT SERPL-MCNC: 0.62 MG/DL — SIGNIFICANT CHANGE UP (ref 0.5–1.3)
CREAT SERPL-MCNC: 0.62 MG/DL — SIGNIFICANT CHANGE UP (ref 0.5–1.3)
EGFR: 80 ML/MIN/1.73M2 — SIGNIFICANT CHANGE UP
EGFR: 80 ML/MIN/1.73M2 — SIGNIFICANT CHANGE UP
GLUCOSE SERPL-MCNC: 114 MG/DL — HIGH (ref 70–99)
GLUCOSE SERPL-MCNC: 139 MG/DL — HIGH (ref 70–99)
HCT VFR BLD CALC: 35.5 % — SIGNIFICANT CHANGE UP (ref 34.5–45)
HCT VFR BLD CALC: 36.9 % — SIGNIFICANT CHANGE UP (ref 34.5–45)
HGB BLD-MCNC: 12.4 G/DL — SIGNIFICANT CHANGE UP (ref 11.5–15.5)
HGB BLD-MCNC: 12.4 G/DL — SIGNIFICANT CHANGE UP (ref 11.5–15.5)
INR BLD: 1.14 RATIO — SIGNIFICANT CHANGE UP (ref 0.85–1.18)
MCHC RBC-ENTMCNC: 32.5 PG — SIGNIFICANT CHANGE UP (ref 27–34)
MCHC RBC-ENTMCNC: 33.4 PG — SIGNIFICANT CHANGE UP (ref 27–34)
MCHC RBC-ENTMCNC: 33.6 GM/DL — SIGNIFICANT CHANGE UP (ref 32–36)
MCHC RBC-ENTMCNC: 34.9 GM/DL — SIGNIFICANT CHANGE UP (ref 32–36)
MCV RBC AUTO: 95.7 FL — SIGNIFICANT CHANGE UP (ref 80–100)
MCV RBC AUTO: 96.9 FL — SIGNIFICANT CHANGE UP (ref 80–100)
NRBC # BLD: 0 /100 WBCS — SIGNIFICANT CHANGE UP (ref 0–0)
NRBC # BLD: 0 /100 WBCS — SIGNIFICANT CHANGE UP (ref 0–0)
PLATELET # BLD AUTO: 113 K/UL — LOW (ref 150–400)
PLATELET # BLD AUTO: 141 K/UL — LOW (ref 150–400)
POTASSIUM SERPL-MCNC: 3.6 MMOL/L — SIGNIFICANT CHANGE UP (ref 3.5–5.3)
POTASSIUM SERPL-MCNC: 3.8 MMOL/L — SIGNIFICANT CHANGE UP (ref 3.5–5.3)
POTASSIUM SERPL-SCNC: 3.6 MMOL/L — SIGNIFICANT CHANGE UP (ref 3.5–5.3)
POTASSIUM SERPL-SCNC: 3.8 MMOL/L — SIGNIFICANT CHANGE UP (ref 3.5–5.3)
PROTHROM AB SERPL-ACNC: 13.3 SEC — HIGH (ref 9.5–13)
RBC # BLD: 3.71 M/UL — LOW (ref 3.8–5.2)
RBC # BLD: 3.81 M/UL — SIGNIFICANT CHANGE UP (ref 3.8–5.2)
RBC # FLD: 12.8 % — SIGNIFICANT CHANGE UP (ref 10.3–14.5)
RBC # FLD: 12.8 % — SIGNIFICANT CHANGE UP (ref 10.3–14.5)
SODIUM SERPL-SCNC: 145 MMOL/L — SIGNIFICANT CHANGE UP (ref 135–145)
SODIUM SERPL-SCNC: 149 MMOL/L — HIGH (ref 135–145)
WBC # BLD: 10.94 K/UL — HIGH (ref 3.8–10.5)
WBC # BLD: 9 K/UL — SIGNIFICANT CHANGE UP (ref 3.8–10.5)
WBC # FLD AUTO: 10.94 K/UL — HIGH (ref 3.8–10.5)
WBC # FLD AUTO: 9 K/UL — SIGNIFICANT CHANGE UP (ref 3.8–10.5)

## 2024-02-25 PROCEDURE — 99233 SBSQ HOSP IP/OBS HIGH 50: CPT

## 2024-02-25 PROCEDURE — 27245 TREAT THIGH FRACTURE: CPT | Mod: RT

## 2024-02-25 DEVICE — SCREW TFNA 90MM STRL: Type: IMPLANTABLE DEVICE | Site: RIGHT | Status: FUNCTIONAL

## 2024-02-25 DEVICE — SCREW LOKG STRL 5X34MM: Type: IMPLANTABLE DEVICE | Site: RIGHT | Status: FUNCTIONAL

## 2024-02-25 DEVICE — NAIL CANN TI 130DEG 11X170MM: Type: IMPLANTABLE DEVICE | Site: RIGHT | Status: FUNCTIONAL

## 2024-02-25 RX ORDER — OLANZAPINE 15 MG/1
5 TABLET, FILM COATED ORAL ONCE
Refills: 0 | Status: COMPLETED | OUTPATIENT
Start: 2024-02-25 | End: 2024-02-25

## 2024-02-25 RX ORDER — ACETAMINOPHEN 500 MG
1000 TABLET ORAL ONCE
Refills: 0 | Status: COMPLETED | OUTPATIENT
Start: 2024-02-25 | End: 2024-02-25

## 2024-02-25 RX ORDER — ACETAMINOPHEN 500 MG
650 TABLET ORAL EVERY 6 HOURS
Refills: 0 | Status: DISCONTINUED | OUTPATIENT
Start: 2024-02-25 | End: 2024-02-25

## 2024-02-25 RX ORDER — OLANZAPINE 15 MG/1
2.5 TABLET, FILM COATED ORAL EVERY 6 HOURS
Refills: 0 | Status: DISCONTINUED | OUTPATIENT
Start: 2024-02-25 | End: 2024-02-29

## 2024-02-25 RX ORDER — SENNA PLUS 8.6 MG/1
2 TABLET ORAL AT BEDTIME
Refills: 0 | Status: DISCONTINUED | OUTPATIENT
Start: 2024-02-25 | End: 2024-03-01

## 2024-02-25 RX ORDER — CEFAZOLIN SODIUM 1 G
2000 VIAL (EA) INJECTION ONCE
Refills: 0 | Status: COMPLETED | OUTPATIENT
Start: 2024-02-25 | End: 2024-02-25

## 2024-02-25 RX ORDER — CEFAZOLIN SODIUM 1 G
2000 VIAL (EA) INJECTION EVERY 8 HOURS
Refills: 0 | Status: COMPLETED | OUTPATIENT
Start: 2024-02-25 | End: 2024-02-26

## 2024-02-25 RX ORDER — OLANZAPINE 15 MG/1
5 TABLET, FILM COATED ORAL ONCE
Refills: 0 | Status: DISCONTINUED | OUTPATIENT
Start: 2024-02-25 | End: 2024-02-25

## 2024-02-25 RX ORDER — MAGNESIUM HYDROXIDE 400 MG/1
30 TABLET, CHEWABLE ORAL DAILY
Refills: 0 | Status: DISCONTINUED | OUTPATIENT
Start: 2024-02-25 | End: 2024-03-01

## 2024-02-25 RX ORDER — ONDANSETRON 8 MG/1
4 TABLET, FILM COATED ORAL EVERY 6 HOURS
Refills: 0 | Status: DISCONTINUED | OUTPATIENT
Start: 2024-02-25 | End: 2024-03-01

## 2024-02-25 RX ORDER — ACETAMINOPHEN 500 MG
650 TABLET ORAL ONCE
Refills: 0 | Status: COMPLETED | OUTPATIENT
Start: 2024-02-25 | End: 2024-02-25

## 2024-02-25 RX ORDER — ACETAMINOPHEN 500 MG
650 TABLET ORAL ONCE
Refills: 0 | Status: COMPLETED | OUTPATIENT
Start: 2024-02-26 | End: 2024-02-26

## 2024-02-25 RX ORDER — HYDROMORPHONE HYDROCHLORIDE 2 MG/ML
0.2 INJECTION INTRAMUSCULAR; INTRAVENOUS; SUBCUTANEOUS
Refills: 0 | Status: DISCONTINUED | OUTPATIENT
Start: 2024-02-25 | End: 2024-02-25

## 2024-02-25 RX ORDER — KETOROLAC TROMETHAMINE 30 MG/ML
15 SYRINGE (ML) INJECTION EVERY 8 HOURS
Refills: 0 | Status: DISCONTINUED | OUTPATIENT
Start: 2024-02-25 | End: 2024-02-26

## 2024-02-25 RX ORDER — POLYETHYLENE GLYCOL 3350 17 G/17G
17 POWDER, FOR SOLUTION ORAL AT BEDTIME
Refills: 0 | Status: DISCONTINUED | OUTPATIENT
Start: 2024-02-25 | End: 2024-03-01

## 2024-02-25 RX ORDER — ONDANSETRON 8 MG/1
4 TABLET, FILM COATED ORAL ONCE
Refills: 0 | Status: DISCONTINUED | OUTPATIENT
Start: 2024-02-25 | End: 2024-02-25

## 2024-02-25 RX ORDER — SODIUM CHLORIDE 9 MG/ML
1000 INJECTION, SOLUTION INTRAVENOUS
Refills: 0 | Status: DISCONTINUED | OUTPATIENT
Start: 2024-02-25 | End: 2024-02-25

## 2024-02-25 RX ORDER — TRAMADOL HYDROCHLORIDE 50 MG/1
25 TABLET ORAL EVERY 6 HOURS
Refills: 0 | Status: DISCONTINUED | OUTPATIENT
Start: 2024-02-25 | End: 2024-02-25

## 2024-02-25 RX ORDER — ENOXAPARIN SODIUM 100 MG/ML
40 INJECTION SUBCUTANEOUS EVERY 24 HOURS
Refills: 0 | Status: DISCONTINUED | OUTPATIENT
Start: 2024-02-26 | End: 2024-02-27

## 2024-02-25 RX ORDER — OLANZAPINE 15 MG/1
2.5 TABLET, FILM COATED ORAL ONCE
Refills: 0 | Status: COMPLETED | OUTPATIENT
Start: 2024-02-25 | End: 2024-02-25

## 2024-02-25 RX ORDER — ACETAMINOPHEN 500 MG
650 TABLET ORAL ONCE
Refills: 0 | Status: DISCONTINUED | OUTPATIENT
Start: 2024-02-25 | End: 2024-02-25

## 2024-02-25 RX ADMIN — SODIUM CHLORIDE 75 MILLILITER(S): 9 INJECTION, SOLUTION INTRAVENOUS at 11:49

## 2024-02-25 RX ADMIN — OLANZAPINE 2.5 MILLIGRAM(S): 15 TABLET, FILM COATED ORAL at 17:20

## 2024-02-25 RX ADMIN — OLANZAPINE 5 MILLIGRAM(S): 15 TABLET, FILM COATED ORAL at 02:53

## 2024-02-25 RX ADMIN — Medication 400 MILLIGRAM(S): at 17:01

## 2024-02-25 RX ADMIN — Medication 100 MILLIGRAM(S): at 18:28

## 2024-02-25 NOTE — DISCHARGE NOTE PROVIDER - NSDCMRMEDTOKEN_GEN_ALL_CORE_FT
melatonin 3 mg oral tablet: 1 tab(s) orally once a day as needed for  insomnia  Xanax 0.25 mg oral tablet: 0.5 tab(s) orally once a day as needed for  agitation   bisacodyl 10 mg rectal suppository: 1 suppository(ies) rectal once As needed Constipation  Free water: Free water 200cc every 6 hours  Lovenox 40 mg/0.4 mL injectable solution: 40 milligram(s) subcutaneously once a day  magnesium hydroxide 8% oral suspension: 30 milliliter(s) orally once a day As needed Constipation  melatonin 3 mg oral tablet: 1 tab(s) orally once a day as needed for  insomnia  polyethylene glycol 3350 oral powder for reconstitution: 17 gram(s) orally once a day (at bedtime)  senna leaf extract oral tablet: 2 tab(s) orally once a day (at bedtime)   bisacodyl 10 mg rectal suppository: 1 suppository(ies) rectal once As needed Constipation  Free water: Free water 200cc every 6 hours  Lovenox 40 mg/0.4 mL injectable solution: 40 milligram(s) subcutaneously once a day  magnesium hydroxide 8% oral suspension: 30 milliliter(s) orally once a day As needed Constipation  melatonin 3 mg oral tablet: 1 tab(s) orally once a day as needed for  insomnia  polyethylene glycol 3350 oral powder for reconstitution: 17 gram(s) orally once a day (at bedtime)  senna leaf extract oral tablet: 2 tab(s) orally once a day (at bedtime)  Xanax 0.25 mg oral tablet: 0.5 tab(s) orally once a day as needed for  agitation MDD: 1 tab

## 2024-02-25 NOTE — BRIEF OPERATIVE NOTE - CONDITION POST OP
Stable
admitted for ankle fracture    no surgical intervention per ortho    weight bearing per ortho    PT for ambulation    permacath removed as AVF functioning    DC after renal and ortho clearance

## 2024-02-25 NOTE — DISCHARGE NOTE PROVIDER - NSDCCPCAREPLAN_GEN_ALL_CORE_FT
PRINCIPAL DISCHARGE DIAGNOSIS  Diagnosis: Hip fracture, right  Assessment and Plan of Treatment: S/P  IMN  BY [facility] team  FU UP OUT PT IN 1 TO 2WK. - RT LOW EXT WABT , PAIN TYLENOL AS NEEDED.      SECONDARY DISCHARGE DIAGNOSES  Diagnosis: Dementia  Assessment and Plan of Treatment: STABLE .    Diagnosis: Hematoma  Assessment and Plan of Treatment: POST FALL LT LOWER LEG HEMATOMA - STABLE.     PRINCIPAL DISCHARGE DIAGNOSIS  Diagnosis: Hip fracture, right  Assessment and Plan of Treatment: S/P  IMN  BY [facility] team  FU UP OUT PT IN 1 TO 2WK. - RT LOW EXT WABT , PAIN TYLENOL AS NEEDED.      SECONDARY DISCHARGE DIAGNOSES  Diagnosis: Hematoma  Assessment and Plan of Treatment: POST FALL LT LOWER LEG HEMATOMA - STABLE. Dressing change as orderred    Diagnosis: Dementia  Assessment and Plan of Treatment: supportive care , fall precaution    Diagnosis: Anemia due to acute blood loss  Assessment and Plan of Treatment: acute blood loss from surgery and hematoma, stable, monitor at out patient.    Diagnosis: Hypernatremia  Assessment and Plan of Treatment: s/p IVF, keep free water intake  as discussed and keep hydrattion , low salt diet , monitor Na closely in 1-2 days     PRINCIPAL DISCHARGE DIAGNOSIS  Diagnosis: Hip fracture, right  Assessment and Plan of Treatment: S/P  IMN  BY [facility] team  FU UP OUT PT IN 1 TO 2WK. - RT LOW EXT WABT , PAIN TYLENOL AS NEEDED.      SECONDARY DISCHARGE DIAGNOSES  Diagnosis: Hematoma  Assessment and Plan of Treatment: POST FALL LT LOWER LEG HEMATOMA - STABLE. Dressing change as orderred    Diagnosis: Dementia  Assessment and Plan of Treatment: supportive care , fall precaution    Diagnosis: Anemia due to acute blood loss  Assessment and Plan of Treatment: acute blood loss from surgery and hematoma, stable, monitor at out patient.    Diagnosis: Hypernatremia  Assessment and Plan of Treatment: s/p IVF, keep free water intake  as discussed and keep hydrattion , low salt diet , monitor Na closely in 1-2 days    Diagnosis: Wound of skin  Assessment and Plan of Treatment: Place acquacel dressing, ABD, kerlex and ACE bandate, wrap LLE.   Keep LLE elevated.

## 2024-02-25 NOTE — DISCHARGE NOTE PROVIDER - PROVIDER TOKENS
PROVIDER:[TOKEN:[[id number]]] PROVIDER:[TOKEN:[[id number]],FOLLOWUP:[2 weeks]],PROVIDER:[TOKEN:[[id number]],FOLLOWUP:[Routine]],FREE:[LAST:[your primary care doctor],PHONE:[(   )    -],FAX:[(   )    -],FOLLOWUP:[1-3 days]],PROVIDER:[TOKEN:[[id number]],FOLLOWUP:[1 week]]

## 2024-02-25 NOTE — PROGRESS NOTE ADULT - SUBJECTIVE AND OBJECTIVE BOX
Postop Check    Patient tolerated the procedure well. Patient seen and examined at bedside, resting comfortably. Continues to minimally follow command due to prior baseline mental status.    LABS:                        12.4   10.94 )-----------( 113      ( 25 Feb 2024 12:21 )             36.9     02-25    145  |  110<H>  |  16  ----------------------------<  139<H>  3.6   |  29  |  0.62    Ca    8.3<L>      25 Feb 2024 12:21      PT/INR - ( 25 Feb 2024 04:30 )   PT: 13.3 sec;   INR: 1.14 ratio         PTT - ( 25 Feb 2024 04:30 )  PTT:31.2 sec      Physical Exam:  T(C): 36.3 (02-25-24 @ 13:04), Max: 37.1 (02-24-24 @ 19:33)  HR: 69 (02-25-24 @ 13:04) (69 - 87)  BP: 150/71 (02-25-24 @ 13:04) (99/63 - 150/71)  RR: 17 (02-25-24 @ 13:04) (10 - 17)  SpO2: 94% (02-25-24 @ 13:04) (93% - 99%)    General: NAD, resting comfortably in bed, minimally following command   RLE   Dressing C/D/I  SCDs present bilaterally  Compartments soft and compressible  No calf tenderness bilaterally  Grossly moving all extremities   2+ DP    A/P:  89y F POD 1 s/p R IMN  -PT/OT   -WBAT  -Pain Control  -HOLD DVT ppx; START POD 1  -Monitor LLE Hematoma, especially after DVT ppx started   -Continue perioperative abx x 24 hours  -FU AM Labs  -Rest, ice, compress and elevate the extremity as we needed  -Incentive Spirometry  -Medical management appreciated    Discussed with Attending who is aware and agrees with above plan.

## 2024-02-25 NOTE — CONSULT NOTE ADULT - SUBJECTIVE AND OBJECTIVE BOX
History of Present Illness: The patient is a 89 year old female with a history of HTN, dementia who presents with a fall. The patient is unable to provide any history. As per notes, she was walking and tripped and fell.    Past Medical/Surgical History:  HTN, dementia     Medications:  Home Medications:  melatonin 3 mg oral tablet: 1 tab(s) orally once a day as needed for  insomnia (24 Feb 2024 19:34)  Xanax 0.25 mg oral tablet: 0.5 tab(s) orally once a day as needed for  agitation (24 Feb 2024 19:34)      Family History: Non-contributory family history of premature cardiovascular atherosclerotic disease    Social History: No tobacco, alcohol or drug use    Review of Systems:  General: No fevers, chills, weight gain  Skin: No rashes, color changes  Cardiovascular: No chest pain, orthopnea  Respiratory: No shortness of breath, cough  Gastrointestinal: No nausea, abdominal pain  Genitourinary: No incontinence, pain with urination  Musculoskeletal: No pain, swelling, decreased range of motion  Neurological: No headache, weakness  Psychiatric: No depression, anxiety  Endocrine: No weight gain, increased thirst  All other systems are comprehensively negative.    Physical Exam:  Vitals:        Vital Signs Last 24 Hrs  T(C): 36.4 (25 Feb 2024 04:30), Max: 37.1 (24 Feb 2024 19:33)  T(F): 97.6 (25 Feb 2024 04:30), Max: 98.7 (24 Feb 2024 19:33)  HR: 78 (25 Feb 2024 04:30) (73 - 87)  BP: 129/67 (25 Feb 2024 04:30) (99/63 - 129/67)  BP(mean): --  RR: 17 (25 Feb 2024 04:30) (16 - 17)  SpO2: 98% (25 Feb 2024 04:30) (94% - 98%)  Parameters below as of 25 Feb 2024 04:30  Patient On (Oxygen Delivery Method): nasal cannula  General: NAD  HEENT: MMM  Neck: No JVD, no carotid bruit  Lungs: CTAB  CV: RRR, nl S1/S2, no M/R/G  Abdomen: S/NT/ND, +BS  Extremities: No LE edema, no cyanosis  Neuro: AAOx0  Skin: No rash    Labs:                        12.4   9.00  )-----------( 141      ( 25 Feb 2024 04:30 )             35.5     02-25    149<H>  |  113<H>  |  20  ----------------------------<  114<H>  3.8   |  31  |  0.62    Ca    8.6      25 Feb 2024 04:30          PT/INR - ( 25 Feb 2024 04:30 )   PT: 13.3 sec;   INR: 1.14 ratio         PTT - ( 25 Feb 2024 04:30 )  PTT:31.2 sec    ECG/Telemetry: NSR, 1st deg AVB, normal axis, no ST abnormality

## 2024-02-25 NOTE — PROGRESS NOTE ADULT - ASSESSMENT
89-year-old female past medical history of severe advanced dementia and HTN, presented to ED brought in by her daughter for right hip pain and left leg hematoma s/p a mechanical fall today. Admit intertrochanteric fracture of the right proximal femur.

## 2024-02-25 NOTE — DISCHARGE NOTE PROVIDER - CARE PROVIDER_API CALL
Chino Novoa  Orthopaedic Surgery  [address], [address]  [place] [address]  Phone: [phone]  Fax: [phone]  Follow Up Time:    Chino Novoa  Orthopaedic Surgery  [address], [address]  [place] [address]  Phone: [phone]  Fax: [phone]  Follow Up Time: 2 weeks    Froylan Gonsales  Nephrology  [address], [address]  [place] [address]  Phone: [phone]  Fax: [phone]  Follow Up Time: Routine    your primary care doctor,   Phone: (   )    -  Fax: (   )    -  Follow Up Time: 1-3 days    Francesco Jay  Plastic Surgery  [address], [address]  [place] [address]  Phone: [phone]  Fax: [phone]  Follow Up Time: 1 week

## 2024-02-25 NOTE — DISCHARGE NOTE PROVIDER - HOSPITAL COURSE
89 f adavnce age  mech fall   intertrochanteric fracture of the right proximal femur  POD 2 s/p R IMN  -PT/OT  WBAT Pain Control DVT ppx:  -Lovenox 40mg daily x 30 day .  mild hypernatremia  sec to decrease po intack started gentle hydration . POST MECH FALL TRAUMATIC  large hematoma on left lower leg remain stable.      pt recommend  by pt evaluation catherine but pt daughter want home / home pt . ADMISSION DATE:  02-24-24    ---  FROM ADMISSION H+P:   HPI:  89-year-old female past medical history of severe advanced dementia, HTN, presented to ED brought in by her daughter for right hip pain and left leg hematoma s/p a mechanical fall today. Unable to obtain history from pt as she received zyprexa and ativan prior to exam, history provided by daughter. At 11:30 today the pt was walking in the dining room and tripped on the table leg, falling on her right side. The daughter helped her up, but the pt was complaining of right leg pain and was unable to walk. Daughter denies head trauma. At baseline the pt is AOx1 and walks unassisted. The pt also hit her left leg on the table leg, and now has large hematoma there. Denies anticoagulation use.   Daughter denies hx reactions to anesthesia.     ED COURSE:  Vitals: T 98.7, HR 85, BP 99/63, RR 17, SpO2 94% on RA  Labs significant for: WBC 11.12, INR 1.04, Na 147,   Imaging:  CT HEAD: Mild periventricular white matter ischemia. Global atrophy most prominent in the BILATERAL temporal lobes. Chronic LEFT maxillary sinusitis.  CT cervical spine:   No vertebral fracture is recognized.  Straightening on a degenerative basis with grade 1 anterior spondylolisthesis at C7-T1 on a degenerative basis.   advanced degenerative disc disease and spondylosis at C5-6 and C6-7 with loss of disc height and associated degenerative endplate changes. Narrowing of the LEFT C4-5, BILATERAL C5-6 and C6/7 neural foramina due to uncovertebral spurring and facet osteophytic hypertrophy. Posterior osteophytic ridge/disc complexes at C5-6 and C6-7 flatten the ventral thecal sac  CT pelvis: Mild varus angulation of an intertrochanteric fracture of the right proximal femur.  Pt received: zyprexa 5 mg x2, Ativan 1 mg x1 (24 Feb 2024 19:22)      ---  HOSPITAL COURSE/PERTINENT LABS/PROCEDURES PERFORMED/PENDING TESTS:   Pt was admitted for  Fracture of proximal end of right femur.   - CT pelvis: Mild varus angulation of an intertrochanteric fracture of the right proximal femur  - Ortho consulted and s/p right hip IMN on 2/25  - PT consult- CLAUDIA   - Pain management as current  - DVT prophylaxis with lovenox      Problem/Plan - 2:  ·  Problem: Hematoma.   ·  Plan: - large hematoma on left lower leg  -  hematoma was aspirated by plastic surgery, improved.  - dressing change as current       Patient is stable for discharge as per primary medical team and consultants.    PT consulted, recommends discharge ______SAR     Patient showed improvement throughout hospitalization. Patient was seen and examined on day of discharge. Patient was medically optimized for discharge with close outpatient follow up.    ---  PATIENT CONDITION:  - stable    --  VITALS:   T(C): 36.5 (03-01-24 @ 04:35), Max: 36.5 (02-29-24 @ 14:20)  HR: 70 (03-01-24 @ 04:35) (66 - 81)  BP: 123/74 (03-01-24 @ 04:35) (106/65 - 123/74)  RR: 18 (03-01-24 @ 04:35) (17 - 18)  SpO2: 98% (03-01-24 @ 04:35) (97% - 98%)    PHYSICAL EXAM ON DAY OF DISCHARGE:  GENERAL: NAD, lying in bed comfortably  HEAD:  Normocephalic  EYES:  conjunctiva and sclera clear  ENT: Moist mucous membranes  NECK: Supple  CHEST/LUNG: Clear to auscultation bilaterally; No rales or rhonchi; no wheezing. Unlabored respirations  HEART: Regular rate and rhythm; S1S2+  ABDOMEN: Bowel sounds present; Soft, Nontender, Nondistended.   EXTREMITIES:  + distal Peripheral Pulses;  No cyanosis, or edema, R hip Sx site clean, Aquacel dressing on.   NERVOUS SYSTEM:  Alert & Oriented X1;  No gross focal deficits   MSK: moves all extremities  SKIN: left shin hematoma improved, small area scab open with scant amount pinkish fluid, bruises B/L ABIDA     ---  CONSULTANTS:   plastic surgery   orthopedic surgery   nephrology

## 2024-02-25 NOTE — DISCHARGE NOTE PROVIDER - CARE PROVIDERS DIRECT ADDRESSES
,[email or web address],[email or web address],DirectAddress_Unknown,[email or web address]

## 2024-02-25 NOTE — PROGRESS NOTE ADULT - SUBJECTIVE AND OBJECTIVE BOX
Patient is a 89y old  Female who presents with a chief complaint of right femur fracture (25 Feb 2024 12:45)      SUBJECTIVE / OVERNIGHT EVENTS: Patient seen and examined at bedside. No acute events overnight. Patient seen in PACU s/p right hip IMN. +drowsy.    MEDICATIONS  (STANDING):  acetaminophen     Tablet .. 650 milliGRAM(s) Oral every 6 hours  acetaminophen   IVPB .. 1000 milliGRAM(s) IV Intermittent once  ceFAZolin   IVPB 2000 milliGRAM(s) IV Intermittent every 8 hours  polyethylene glycol 3350 17 Gram(s) Oral at bedtime  senna 2 Tablet(s) Oral at bedtime    MEDICATIONS  (PRN):  magnesium hydroxide Suspension 30 milliLiter(s) Oral daily PRN Constipation  ondansetron Injectable 4 milliGRAM(s) IV Push every 6 hours PRN Nausea and/or Vomiting  traMADol 25 milliGRAM(s) Oral every 6 hours PRN Mild Pain (1 - 3)      CAPILLARY BLOOD GLUCOSE        I&O's Summary      PHYSICAL EXAM:  Vital Signs Last 24 Hrs  T(C): 36.3 (25 Feb 2024 13:04), Max: 37.1 (24 Feb 2024 19:33)  T(F): 97.3 (25 Feb 2024 13:04), Max: 98.7 (24 Feb 2024 19:33)  HR: 69 (25 Feb 2024 13:04) (69 - 87)  BP: 150/71 (25 Feb 2024 13:04) (99/63 - 150/71)  BP(mean): --  RR: 17 (25 Feb 2024 13:04) (10 - 17)  SpO2: 94% (25 Feb 2024 13:04) (93% - 99%)    Parameters below as of 25 Feb 2024 13:04  Patient On (Oxygen Delivery Method): nasal cannula  O2 Flow (L/min): 2      GEN: female in NAD, appears comfortable, no diaphoresis  EYES: No scleral injection, EOMI  ENTM: neck supple & symmetric without tracheal deviation, moist membranes, no gross hearing impairment, thyroid gland not enlarged  CV: +S1/S2, no m/r/g, no abdominal bruit, no LE edema  RESP: breathing comfortably, no respiratory accessory muscle use, CTAB, no w/r/r  GI: normoactive BS, soft, NTND, no rebounding/guarding, no palpable masses    LABS:                        12.4   10.94 )-----------( 113      ( 25 Feb 2024 12:21 )             36.9     02-25    145  |  110<H>  |  16  ----------------------------<  139<H>  3.6   |  29  |  0.62    Ca    8.3<L>      25 Feb 2024 12:21      PT/INR - ( 25 Feb 2024 04:30 )   PT: 13.3 sec;   INR: 1.14 ratio         PTT - ( 25 Feb 2024 04:30 )  PTT:31.2 sec      Urinalysis Basic - ( 25 Feb 2024 12:21 )    Color: x / Appearance: x / SG: x / pH: x  Gluc: 139 mg/dL / Ketone: x  / Bili: x / Urobili: x   Blood: x / Protein: x / Nitrite: x   Leuk Esterase: x / RBC: x / WBC x   Sq Epi: x / Non Sq Epi: x / Bacteria: x          RADIOLOGY & ADDITIONAL TESTS:  Results Reviewed:   Imaging Personally Reviewed:  Electrocardiogram Personally Reviewed:    COORDINATION OF CARE:  Care Discussed with Consultants/Other Providers [Y/N]:  Prior or Outpatient Records Reviewed [Y/N]:

## 2024-02-25 NOTE — DISCHARGE NOTE PROVIDER - NSDCFUADDINST_GEN_ALL_CORE_FT
Orthopedic Surgery DC Instructions:  1. ACTIVITY: Weightbearing as tolerated with assistive devices as needed (i.e. cane/walker).  2. DVT/PE Prophylaxis: Continue anticoagulation medication. See Med Rec for duration and dose.  3. PHYSICAL THERAPY: You should receive physical therapy daily.  4. FOLLOW UP: Follow up outpatient with your Orthopedic Surgeon Dr. Novoa in 2 weeks from surgery (???).           5. BANDAGE: Change bandage on knee on POD #7 to dry gauze and tegaderm or paper tape. Can also change PRN if saturated. If going to rehab facility, Do NOT remove on arrival to inspect wound.   6. BATHING: Showering is allowed, however sponge baths are recommended. You must keep your dressing and splint clean/dry/and intact. Please cover splint and dressing with plastic bag/wrap to prevent dressing from becoming wet. Do NOT submerge dressing/splint in water. Please avoid baths/pools/hot tubs until cleared by your surgeon.

## 2024-02-25 NOTE — PROGRESS NOTE ADULT - SUBJECTIVE AND OBJECTIVE BOX
Patient seen and examined at bedside. Not following command this morning, but grossly moving her extremities      LABS:                        12.4   9.00  )-----------( 141      ( 25 Feb 2024 04:30 )             35.5     02-25    149<H>  |  113<H>  |  20  ----------------------------<  114<H>  3.8   |  31  |  0.62    Ca    8.6      25 Feb 2024 04:30      PT/INR - ( 25 Feb 2024 04:30 )   PT: 13.3 sec;   INR: 1.14 ratio         PTT - ( 25 Feb 2024 04:30 )  PTT:31.2 sec      Physical Exam:  T(C): 36.4 (02-25-24 @ 04:30), Max: 37.1 (02-24-24 @ 19:33)  HR: 78 (02-25-24 @ 04:30) (73 - 87)  BP: 129/67 (02-25-24 @ 04:30) (99/63 - 129/67)  RR: 17 (02-25-24 @ 04:30) (16 - 17)  SpO2: 98% (02-25-24 @ 04:30) (94% - 98%)    General: NAD, AAOx1  RLE   SCDs present bilaterally  Compartments soft and compressible  No calf tenderness bilaterally  Grossly moving extremities   2+ DP    A/P:  A/P: 89yFemale with a R IT Fx. Patient is a poor historian and AAOx1. Partially able to follow command. Discussed with Daughter (Oapl), who is at bedside, and wishes to proceed with surgery.     Plan for OR Today with Dr. Novoa for a R IMN  NPO, except medications  IVF while NPO  Hold DVT ppx   CT L Tib/Fib: 8cm soft tissue hematoma  Medically optimized for surgery this morning  FU Cards Recs   Pain Control As Needed   Ice hip as tolerated  NWB, Strict Bed Rest  SCDs while in bed   Medical recommendations appreciated     Discussed with attending who is in agreement with above plan

## 2024-02-26 PROCEDURE — 99233 SBSQ HOSP IP/OBS HIGH 50: CPT | Mod: GC

## 2024-02-26 RX ADMIN — Medication 650 MILLIGRAM(S): at 01:56

## 2024-02-26 RX ADMIN — Medication 100 MILLIGRAM(S): at 00:41

## 2024-02-26 RX ADMIN — Medication 260 MILLIGRAM(S): at 00:56

## 2024-02-26 NOTE — OCCUPATIONAL THERAPY INITIAL EVALUATION ADULT - ADDITIONAL COMMENTS
Pt unable to provide info or answer questions. Social info obtained from daughter Opal. Dtr lives with patient in a house with 3 steps to enter with handrail. Pt owns a rolling walker. Pt has a stall shower/converted tub with built-in shower chair. Pt ambulated with no devices indoors. Dtr reports that patient ambulates to the bathroom at home though needs assistance with hygiene management.  Pt performed sit to stand with max assist x 2 and unable to ambulate due to decreased strength/impaired balance. Pt requires assistance with ADL's and transfers due to decreased strength, impaired cognition, decreased endurance and impaired sitting/standing balance.

## 2024-02-26 NOTE — OCCUPATIONAL THERAPY INITIAL EVALUATION ADULT - PERTINENT HX OF CURRENT PROBLEM, REHAB EVAL
89-year-old female past medical history of severe advanced dementia, HTN, presented to ED brought in by her daughter for right hip pain and left leg hematoma s/p a mechanical fall today. Unable to obtain history from pt as she received zyprexa and ativan prior to exam, history provided by daughter. At 11:30 today the pt was walking in the dining room and tripped on the table leg, falling on her right side. The daughter helped her up, but the pt was complaining of right leg pain and was unable to walk. Daughter denies head trauma. At baseline the pt is AOx1 and walks unassisted. The pt also hit her left leg on the table leg, and now has large hematoma there. Denies anticoagulation use. Daughter denies hx reactions to anesthesia. Patient admitted 2/24/24 with right hip fx and s/p IM nailing right hip 2/25/24. 89 year-old female past medical history of severe advanced dementia, HTN, presented to ED brought in by her daughter for right hip pain and left leg hematoma s/p a mechanical fall today. Unable to obtain history from pt as she received Zyprexa and Ativan prior to exam, history provided by daughter. At 11:30 today the pt was walking in the dining room and tripped on the table leg, falling on her right side. The daughter helped her up, but the pt was complaining of right leg pain and was unable to walk. Daughter denies head trauma. At baseline the pt is AOx1 and walks unassisted. The pt also hit her left leg on the table leg, and now has large hematoma there. Denies anticoagulation use. Daughter denies hx reactions to anesthesia. Patient admitted 2/24/24 with right hip fx and s/p IM nailing right hip 2/25/24.

## 2024-02-26 NOTE — OCCUPATIONAL THERAPY INITIAL EVALUATION ADULT - GENERAL OBSERVATIONS, REHAB EVAL
Patient received supine in bed, confused, aggressive, no apparent distress Patient received supine in bed, +bandages (2) Right LE. Patient appears confused, aggressive, in no apparent distress. Daughter Opal present bedside. Patient chart reviewed, events noted. Patient cleared to be seen for therapy by RN prior to session.

## 2024-02-26 NOTE — DIETITIAN INITIAL EVALUATION ADULT - ORAL INTAKE PTA/DIET HISTORY
Pt lives at home with daughter. Regular diet at home.  Pt lives at home with daughter. Regular diet at home. Daughter prepares pt's meals. Typically consumes 3 meals/day. Occasionally drinks vanilla ensure shakes.

## 2024-02-26 NOTE — OCCUPATIONAL THERAPY INITIAL EVALUATION ADULT - RANGE OF MOTION EXAMINATION, UPPER EXTREMITY
Fine motor skills: WFL's-impaired due to impaired cognition./bilateral UE Active Assistive ROM was WFL  (within functional limits)

## 2024-02-26 NOTE — OCCUPATIONAL THERAPY INITIAL EVALUATION ADULT - BALANCE TRAINING, PT EVAL
Patient will improve static sitting balance to Good- to enable patient to perform transfers with mod assist in 4-6 sessions.

## 2024-02-26 NOTE — DIETITIAN INITIAL EVALUATION ADULT - PROBLEM SELECTOR PLAN 1
- pt presents s/p mechanical fall  - CT pelvis: Mild varus angulation of an intertrochanteric fracture of the right proximal femur.  - pain regimen: IV tylenol prn for mild pain. daughter requests avoiding morphine/opiates.  - ice to area  - strict bedrest  - NPO after midnight  - Ortho consulted, plan for OR in AM with Dr. Novoa for a R IMN  - daughter denies hx anesthesia reactions  - METS limited based on functional status  - RCRI score: Class I risk, 3.9 % 30-day risk of death, MI, or cardiac arrest  - Cardio consulted for cardiac clearance, Dr. Mckeon covering for Dr. Hernández

## 2024-02-26 NOTE — CARE COORDINATION ASSESSMENT. - PATIENT WAS INVOLVED WITH A HOMECARE AGENCY PRIOR TO ADMISSION?
The pt received private hire home care services (M-F from 10am to 5pm and Saturday 11am-6pm). Pt's daughter is actively involved in pt's care./Yes

## 2024-02-26 NOTE — CARE COORDINATION ASSESSMENT. - NSCAREPROVIDERS_GEN_ALL_CORE_FT
CARE PROVIDERS:  Accepting Physician: Tyree Duff  Administration: Bhanu Fischer  Administration: Bhupendra Barnard  Administration: Jf Conn  Administration: Bibiana Conte  Administration: Mk Urrutia  Admitting: Tyree Duff  Attending: Ana Myers  Case Management: Gertrudis Clark  Consultant: Margoth Vera  Consultant: Chino Novoa  Consultant: Robert Garzon  Consultant: Jason Lozano  Consultant: Swapnil Selby ED ACP: Cierra Wright ED Attending: Sean Nash ED Nurse: Janice Zepeda  Nurse: Tonia Anne  Occupational Therapy: Yasmeen Sykes  Ordered: ADM, User  Ordered: Doctor, Unknown  Outpatient Provider: Hernandez Saba  Override: Beny Hastings  PCA/Nursing Assistant: Marion Magana  PCA/Nursing Assistant: wallace Diallo  Physical Therapy: Stephanie Franklin  Physical Therapy: Dustin Walls  Primary Team: Justin Feliz  Primary Team: Cristy Rangel  Registered Dietitian: Maria Antonia Mares  Registered Dietitian: Annabelle Phan  : She Palmer  Team: [facility], Team

## 2024-02-26 NOTE — DIETITIAN INITIAL EVALUATION ADULT - PROBLEM SELECTOR PLAN 2
- chronic problem  - baseline is speaking but AOx1 per daughter  - pt previously walked unassisted  - pt eats regular diet at home  - s/p zyprexa 5 mg x2 and Ativan 1 mg in ED  - home aide will stay at bedside overnight

## 2024-02-26 NOTE — PROGRESS NOTE ADULT - ASSESSMENT
89 year old female with a history of HTN, dementia who presents with a fall now POD 1 s/p RIGHT IMN Hip repair    TTE W or WO Ultrasound Enhancing Agent 2.25.24    1. Left ventricular wall thickness is mildly increased. Left ventricular systolic function is normal with an ejection fraction of 60 % by Jimenes's method of disks. There are no regional wall motion abnormalities seen.   2. The left atrium is mildly dilated.   3. Moderate tricuspid regurgitation.   4. Moderate pulmonary hypertension.      Plan:  - ECG with no evidence of ischemia or infarction  - CXR with grossly clear lungs  - Mild hypernatremia - possibly due to mild hypovolemia - continue IV fluids as needed  - No evidence of decompensated heart failure on exam  - DVT ppx, pain mgmt, PT eval

## 2024-02-26 NOTE — CARE COORDINATION ASSESSMENT. - NSPASTMEDSURGHISTORY_GEN_ALL_CORE_FT
PAST MEDICAL & SURGICAL HISTORY:  Left inguinal hernia      Hallucinations  Auditory and visual (per daughter)      HTN (hypertension)      History of retinal detachment      History of total abdominal hysterectomy      History of cholecystectomy      Hiatal hernia      Dementia

## 2024-02-26 NOTE — PHYSICAL THERAPY INITIAL EVALUATION ADULT - PERTINENT HX OF CURRENT PROBLEM, REHAB EVAL
89-year-old female past medical history of severe advanced dementia, HTN, presented to ED brought in by her daughter for right hip pain and left leg hematoma s/p a mechanical fall today. Unable to obtain history from pt as she received zyprexa and ativan prior to exam, history provided by daughter. At 11:30 today the pt was walking in the dining room and tripped on the table leg, falling on her right side. The daughter helped her up, but the pt was complaining of right leg pain and was unable to walk. Daughter denies head trauma. At baseline the pt is AOx1 and walks unassisted. The pt also hit her left leg on the table leg, and now has large hematoma there. Denies anticoagulation use.   Daughter denies hx reactions to anesthesia.

## 2024-02-26 NOTE — DIETITIAN INITIAL EVALUATION ADULT - ADD RECOMMEND
1) Recommend liberalizing diet to regular; honor food preferences as able to optimize po intake/tolerance  2) Recommend ensure plus HP BID (350kcal, 20gm protein per 8 fl oz serving)  3) Recommend MVI daily  4) Monitor po intake, diet tolerance, weight trends, labs, GI function, skin integrity  1) Recommend liberalizing diet to regular; honor food preferences as able to optimize po intake/tolerance, assistance/encouragement at meal times  2) Recommend ensure plus HP BID (350kcal, 20gm protein per 8 fl oz serving)  3) Recommend MVI daily  4) Monitor po intake, diet tolerance, weight trends, labs, GI function, skin integrity

## 2024-02-26 NOTE — CARE COORDINATION ASSESSMENT. - REASON FOR CONSULT
SW met with patient and her daughter at bedside in order to conduct assessment and to discuss SW roles with good understanding.   Pt unable to participate in assessment due to hx of advanced dementia documented in chart./coordination of care/discharge planning

## 2024-02-26 NOTE — PROGRESS NOTE ADULT - SUBJECTIVE AND OBJECTIVE BOX
Patient is a 89y old  Female who presents with a chief complaint of right femur fracture (26 Feb 2024 06:26)    pt seen and examine today s/p rt hip imn , no fever , tolerating po.   INTERVAL HPI/OVERNIGHT EVENTS:     T(C): 36.2 (02-26-24 @ 12:47), Max: 36.6 (02-25-24 @ 22:06)  HR: 95 (02-26-24 @ 12:47) (76 - 95)  BP: 95/53 (02-26-24 @ 12:47) (95/53 - 114/63)  RR: 18 (02-26-24 @ 12:47) (17 - 18)  SpO2: 94% (02-26-24 @ 12:47) (90% - 94%)  Wt(kg): --  I&O's Summary      REVIEW OF SYSTEMS:  CONSTITUTIONAL: No fever, weight loss, + fatigue  EYES: No eye pain, visual disturbances, or discharge  ENMT:    No sinus or throat pain  NECK: No pain or stiffness  BREASTS: No pain, no masses  RESPIRATORY: No cough, wheezing, chills  ,No shortness of breath  CARDIOVASCULAR: No chest pain, palpitations, dizziness, or leg swelling  GASTROINTESTINAL: No abdominal or epigastric pain. No nausea, vomiting, No diarrhea or constipation.    GENITOURINARY: No dysuria, frequency, hematuria, or incontinence  NEUROLOGICAL: No headaches, memory loss, loss of strength, numbness, or tremors  MUSCULOSKELETAL: No joint pain or swelling; No muscle, back, or extremity pain    PHYSICAL EXAM:  GENERAL: NAD, weak   HEAD:  Atraumatic, Normocephalic  EYES: EOMI, PERRLA, conjunctiva and sclera clear  ENMT:   Moist mucous membranes  NECK: Supple, No JVD  NERVOUS SYSTEM:  Awake  & Oriented X0 ; Motor Strength 5/5 B/L upper and lower extremities; DTRs 2+ intact and symmetric  CHEST/LUNG:  percussion bilaterally; No rales, rhonchi, wheezing,   HEART: Regular rate and rhythm; No murmurs,  no tachy   ABDOMEN: Soft, Nontender, Nondistended; Bowel sounds present  EXTREMITIES:  2+ Peripheral Pulses, No clubbing, cyanosis, or edema ,   SKIN: No rashes or lesions lt lower leg  hematoma + , rt leg surgical site dry / clean     MEDICATIONS  (STANDING):  acetaminophen   IVPB .. 650 milliGRAM(s) IV Intermittent once  enoxaparin Injectable 40 milliGRAM(s) SubCutaneous every 24 hours  polyethylene glycol 3350 17 Gram(s) Oral at bedtime  senna 2 Tablet(s) Oral at bedtime    MEDICATIONS  (PRN):  magnesium hydroxide Suspension 30 milliLiter(s) Oral daily PRN Constipation  OLANZapine Injectable 2.5 milliGRAM(s) IntraMuscular every 6 hours PRN agitation  ondansetron Injectable 4 milliGRAM(s) IV Push every 6 hours PRN Nausea and/or Vomiting      LABS:                        12.4   10.94 )-----------( 113      ( 25 Feb 2024 12:21 )             36.9     02-25    145  |  110<H>  |  16  ----------------------------<  139<H>  3.6   |  29  |  0.62    Ca    8.3<L>      25 Feb 2024 12:21      PT/INR - ( 25 Feb 2024 04:30 )   PT: 13.3 sec;   INR: 1.14 ratio         PTT - ( 25 Feb 2024 04:30 )  PTT:31.2 sec  Urinalysis Basic - ( 25 Feb 2024 12:21 )    Color: x / Appearance: x / SG: x / pH: x  Gluc: 139 mg/dL / Ketone: x  / Bili: x / Urobili: x   Blood: x / Protein: x / Nitrite: x   Leuk Esterase: x / RBC: x / WBC x   Sq Epi: x / Non Sq Epi: x / Bacteria: x                      RADIOLOGY & ADDITIONAL TESTS:    Imaging Personally Reviewed:   no new test    Advance Directives:  full code     Palliative Care:  Appropriate

## 2024-02-26 NOTE — PROGRESS NOTE ADULT - SUBJECTIVE AND OBJECTIVE BOX
[facility] Cardiology Progress Note [phone] (Dr. Saba, Keely, Edgar, Dunia)    CHIEF COMPLAINT: Patient is a 89y old  Female who presents with a chief complaint of right femur fracture (25 Feb 2024 15:54)      Follow Up Today: The patient denies any chest discomfort or shortness of breath.    HPI:  89-year-old female past medical history of severe advanced dementia, HTN, presented to ED brought in by her daughter for right hip pain and left leg hematoma s/p a mechanical fall today. Unable to obtain history from pt as she received zyprexa and ativan prior to exam, history provided by daughter. At 11:30 today the pt was walking in the dining room and tripped on the table leg, falling on her right side. The daughter helped her up, but the pt was complaining of right leg pain and was unable to walk. Daughter denies head trauma. At baseline the pt is AOx1 and walks unassisted. The pt also hit her left leg on the table leg, and now has large hematoma there. Denies anticoagulation use.   Daughter denies hx reactions to anesthesia.     ED COURSE:  Vitals: T 98.7, HR 85, BP 99/63, RR 17, SpO2 94% on RA  Labs significant for: WBC 11.12, INR 1.04, Na 147,   Imaging:  CT HEAD: Mild periventricular white matter ischemia. Global atrophy most prominent in the BILATERAL temporal lobes. Chronic LEFT maxillary sinusitis.  CT cervical spine:   No vertebral fracture is recognized.  Straightening on a degenerative basis with grade 1 anterior spondylolisthesis at C7-T1 on a degenerative basis.   advanced degenerative disc disease and spondylosis at C5-6 and C6-7 with loss of disc height and associated degenerative endplate changes. Narrowing of the LEFT C4-5, BILATERAL C5-6 and C6/7 neural foramina due to uncovertebral spurring and facet osteophytic hypertrophy. Posterior osteophytic ridge/disc complexes at C5-6 and C6-7 flatten the ventral thecal sac  CT pelvis: Mild varus angulation of an intertrochanteric fracture of the right proximal femur.  Pt received: zyprexa 5 mg x2, Ativan 1 mg x1 (24 Feb 2024 19:22)      PAST MEDICAL & SURGICAL HISTORY:  HTN (hypertension)      Hallucinations  Auditory and visual (per daughter)      Left inguinal hernia      Dementia      Hiatal hernia      History of cholecystectomy      History of total abdominal hysterectomy      History of retinal detachment          MEDICATIONS  (STANDING):  acetaminophen   IVPB .. 650 milliGRAM(s) IV Intermittent once  enoxaparin Injectable 40 milliGRAM(s) SubCutaneous every 24 hours  polyethylene glycol 3350 17 Gram(s) Oral at bedtime  senna 2 Tablet(s) Oral at bedtime    MEDICATIONS  (PRN):  magnesium hydroxide Suspension 30 milliLiter(s) Oral daily PRN Constipation  OLANZapine Injectable 2.5 milliGRAM(s) IntraMuscular every 6 hours PRN agitation  ondansetron Injectable 4 milliGRAM(s) IV Push every 6 hours PRN Nausea and/or Vomiting      Allergies    Demerol (Unknown)    Intolerances        REVIEW OF SYSTEMS:    All other review of systems is negative unless indicated above    Vital Signs Last 24 Hrs  T(C): 36.2 (26 Feb 2024 02:19), Max: 36.6 (25 Feb 2024 22:06)  T(F): 97.1 (26 Feb 2024 02:19), Max: 97.8 (25 Feb 2024 22:06)  HR: 76 (26 Feb 2024 02:19) (69 - 81)  BP: 110/61 (26 Feb 2024 02:19) (105/57 - 150/71)  BP(mean): --  RR: 18 (26 Feb 2024 02:19) (10 - 18)  SpO2: 91% (26 Feb 2024 02:19) (90% - 99%)    Parameters below as of 26 Feb 2024 02:19  Patient On (Oxygen Delivery Method): room air        I&O's Summary      PHYSICAL EXAM:    Constitutional: NAD, awake and alert, well-developed  Eyes:  EOMI,  Pupils round, No oral cyanosis.  HEENT: No exudate or erythema  Pulmonary: Non-labored, breath sounds are clear bilaterally, No wheezing, rales or rhonchi  Cardiovascular: Regular, S1 and S2, No murmurs  Gastrointestinal: Bowel Sounds present, soft, nontender.   Ext: No significant LE edema  Neurological: Alert, no gross focal motor deficits  Skin: No rashes.  Psych:  Mood & affect appropriate    LABS: All Labs Reviewed:                        12.4   10.94 )-----------( 113      ( 25 Feb 2024 12:21 )             36.9                         12.4   9.00  )-----------( 141      ( 25 Feb 2024 04:30 )             35.5                         13.5   11.12 )-----------( 153      ( 24 Feb 2024 18:15 )             40.3     25 Feb 2024 12:21    145    |  110    |  16     ----------------------------<  139    3.6     |  29     |  0.62   25 Feb 2024 04:30    149    |  113    |  20     ----------------------------<  114    3.8     |  31     |  0.62   24 Feb 2024 18:15    147    |  111    |  21     ----------------------------<  148    3.9     |  29     |  0.71     Ca    8.3        25 Feb 2024 12:21  Ca    8.6        25 Feb 2024 04:30  Ca    9.1        24 Feb 2024 18:15      PT/INR - ( 25 Feb 2024 04:30 )   PT: 13.3 sec;   INR: 1.14 ratio         PTT - ( 25 Feb 2024 04:30 )  PTT:31.2 sec      Blood Culture:         RADIOLOGY/EKG:    Attending Attestation:   50 minutes spent on total encounter; more than 50% of the visit was spent counseling and/or coordinating care by the attending physician.     ASSESSMENT AND PLAN

## 2024-02-26 NOTE — PHYSICAL THERAPY INITIAL EVALUATION ADULT - ADDITIONAL COMMENTS
As per daughter, patient lives in private home with her, +SIOMARA then resides on main level.  Patient has RW which she doesn't use, ambulates with supervision, requires assistance with transfers at times, and assistance with all ADLs

## 2024-02-26 NOTE — DIETITIAN INITIAL EVALUATION ADULT - PERTINENT LABORATORY DATA
02-25    145  |  110<H>  |  16  ----------------------------<  139<H>  3.6   |  29  |  0.62    Ca    8.3<L>      25 Feb 2024 12:21

## 2024-02-26 NOTE — DIETITIAN INITIAL EVALUATION ADULT - PERTINENT MEDS FT
MEDICATIONS  (STANDING):  acetaminophen   IVPB .. 650 milliGRAM(s) IV Intermittent once  enoxaparin Injectable 40 milliGRAM(s) SubCutaneous every 24 hours  polyethylene glycol 3350 17 Gram(s) Oral at bedtime  senna 2 Tablet(s) Oral at bedtime    MEDICATIONS  (PRN):  aluminum hydroxide/magnesium hydroxide/simethicone Suspension 30 milliLiter(s) Oral every 8 hours PRN Dyspepsia  magnesium hydroxide Suspension 30 milliLiter(s) Oral daily PRN Constipation  OLANZapine Injectable 2.5 milliGRAM(s) IntraMuscular every 6 hours PRN agitation  ondansetron Injectable 4 milliGRAM(s) IV Push every 6 hours PRN Nausea and/or Vomiting

## 2024-02-26 NOTE — PROGRESS NOTE ADULT - SUBJECTIVE AND OBJECTIVE BOX
SUBJECTIVE:       Pt seen and examined at bedside. Agitated overnight. Otherwise no acute events overnight. Unable to obtain subjective 2/2 dementia.    Vital Signs (24 Hrs):  T(C): 36.2 (02-26-24 @ 06:24), Max: 36.6 (02-25-24 @ 22:06)  HR: 76 (02-26-24 @ 06:24) (69 - 81)  BP: 110/60 (02-26-24 @ 06:24) (105/57 - 150/71)  RR: 18 (02-26-24 @ 06:24) (10 - 18)  SpO2: 92% (02-26-24 @ 06:24) (90% - 99%)  Wt(kg): --    LABS:                          12.4   10.94 )-----------( 113      ( 25 Feb 2024 12:21 )             36.9     02-25    145  |  110<H>  |  16  ----------------------------<  139<H>  3.6   |  29  |  0.62    Ca    8.3<L>      25 Feb 2024 12:21        PT/INR - ( 25 Feb 2024 04:30 )   PT: 13.3 sec;   INR: 1.14 ratio         PTT - ( 25 Feb 2024 04:30 )  PTT:31.2 sec    Physical Exam:  General: NAD, resting comfortably in bed, minimally following command   RLE   Dressing C/D/I  SCDs present bilaterally  Compartments soft and compressible  No calf tenderness bilaterally  Grossly moving all extremities   2+ DP    A/P:  89y F POD 1 s/p R IMN  -PT/OT   -WBAT  -Pain Control  -DVT ppx: per primary, recommend Lovenox 40mg daily x 30 days postop  -Monitor LLE Hematoma, especially after DVT ppx started   -FU AM Labs  -Rest, ice, compress and elevate the extremity as we needed  -Incentive Spirometry  -Medical management appreciated    Discussed with Attending who is aware and agrees with above plan.

## 2024-02-26 NOTE — OCCUPATIONAL THERAPY INITIAL EVALUATION ADULT - PHYSICAL ASSIST/NONPHYSICAL ASSIST: SIT/STAND, REHAB EVAL
set-up required/verbal cues/2 person assist set-up required/verbal cues/nonverbal cues (demo/gestures)/2 person assist

## 2024-02-26 NOTE — DIETITIAN INITIAL EVALUATION ADULT - OTHER INFO
Pt is a "89-year-old female past medical history of severe advanced dementia and HTN, presented to ED brought in by her daughter for right hip pain and left leg hematoma s/p a mechanical fall today. Admit intertrochanteric fracture of the right proximal femur."    Visited pt at bedside this afternoon. Pt's daughter present during visit. Pt with fair po intake of breakfast meal this am; 50-75% per nursing documentation. Tolerating diet well. No food allergies. No chewing/swallowing difficulties. No N/V per chart review. No BMs thus far. CBW on admission 95#. No edema noted. Skin: stage I to L buttocks, stage I to coccyx. Pt currently on DASH/TLC diet. Will honor food preferences as able to optimize po intake/tolerance.    Pt is a "89-year-old female past medical history of severe advanced dementia and HTN, presented to ED brought in by her daughter for right hip pain and left leg hematoma s/p a mechanical fall today. Admit intertrochanteric fracture of the right proximal femur."    Visited pt at bedside this afternoon. Pt's daughter present during visit. Pt with hx of dementia, confused during visit. Nutrition-related hx obtained from daughter. Pt with decreased po intake since admission. Pt with fair po intake of breakfast meal this am; 50-75% per nursing documentation. Had a few mouthfuls of jello and pudding for lunch. Tolerating diet well. No food allergies. No chewing/swallowing difficulties. No N/V per chart review. No BMs thus far. CBW on admission 95#. Daughter reports UBW of ~85#. Weight stable in the 80s over the past several months. No edema noted. Skin: stage I to L buttocks, stage I to coccyx. Pt currently on DASH/TLC diet. Food preferences obtained to optimize po intake/tolerance. Will provide ensure plus HP BID for additional kcal/protein. RD remains available and will continue to follow-up.

## 2024-02-26 NOTE — CARE COORDINATION ASSESSMENT. - NSDCPLANSERVICES_GEN_ALL_CORE
Anticipated dc needs unclear at this time, pending further PTE recommendations. Pt's daughter mentioned that she prefers to take the patient back home if possible.   The patient receives home visits from her PCP./Other

## 2024-02-27 ENCOUNTER — TRANSCRIPTION ENCOUNTER (OUTPATIENT)
Age: 89
End: 2024-02-27

## 2024-02-27 LAB
ANION GAP SERPL CALC-SCNC: 5 MMOL/L — SIGNIFICANT CHANGE UP (ref 5–17)
BASOPHILS # BLD AUTO: 0.05 K/UL — SIGNIFICANT CHANGE UP (ref 0–0.2)
BASOPHILS NFR BLD AUTO: 0.7 % — SIGNIFICANT CHANGE UP (ref 0–2)
BUN SERPL-MCNC: 25 MG/DL — HIGH (ref 7–23)
CALCIUM SERPL-MCNC: 8.1 MG/DL — LOW (ref 8.5–10.1)
CHLORIDE SERPL-SCNC: 113 MMOL/L — HIGH (ref 96–108)
CO2 SERPL-SCNC: 30 MMOL/L — SIGNIFICANT CHANGE UP (ref 22–31)
CREAT SERPL-MCNC: 0.59 MG/DL — SIGNIFICANT CHANGE UP (ref 0.5–1.3)
EGFR: 81 ML/MIN/1.73M2 — SIGNIFICANT CHANGE UP
EOSINOPHIL # BLD AUTO: 0.33 K/UL — SIGNIFICANT CHANGE UP (ref 0–0.5)
EOSINOPHIL NFR BLD AUTO: 4.6 % — SIGNIFICANT CHANGE UP (ref 0–6)
GLUCOSE SERPL-MCNC: 120 MG/DL — HIGH (ref 70–99)
HCT VFR BLD CALC: 28.8 % — LOW (ref 34.5–45)
HGB BLD-MCNC: 9.8 G/DL — LOW (ref 11.5–15.5)
IMM GRANULOCYTES NFR BLD AUTO: 0.4 % — SIGNIFICANT CHANGE UP (ref 0–0.9)
LYMPHOCYTES # BLD AUTO: 0.72 K/UL — LOW (ref 1–3.3)
LYMPHOCYTES # BLD AUTO: 10.1 % — LOW (ref 13–44)
MCHC RBC-ENTMCNC: 32.7 PG — SIGNIFICANT CHANGE UP (ref 27–34)
MCHC RBC-ENTMCNC: 34 GM/DL — SIGNIFICANT CHANGE UP (ref 32–36)
MCV RBC AUTO: 96 FL — SIGNIFICANT CHANGE UP (ref 80–100)
MONOCYTES # BLD AUTO: 0.38 K/UL — SIGNIFICANT CHANGE UP (ref 0–0.9)
MONOCYTES NFR BLD AUTO: 5.3 % — SIGNIFICANT CHANGE UP (ref 2–14)
NEUTROPHILS # BLD AUTO: 5.6 K/UL — SIGNIFICANT CHANGE UP (ref 1.8–7.4)
NEUTROPHILS NFR BLD AUTO: 78.9 % — HIGH (ref 43–77)
NRBC # BLD: 0 /100 WBCS — SIGNIFICANT CHANGE UP (ref 0–0)
PLATELET # BLD AUTO: 119 K/UL — LOW (ref 150–400)
POTASSIUM SERPL-MCNC: 3.7 MMOL/L — SIGNIFICANT CHANGE UP (ref 3.5–5.3)
POTASSIUM SERPL-SCNC: 3.7 MMOL/L — SIGNIFICANT CHANGE UP (ref 3.5–5.3)
RBC # BLD: 3 M/UL — LOW (ref 3.8–5.2)
RBC # FLD: 12.9 % — SIGNIFICANT CHANGE UP (ref 10.3–14.5)
SODIUM SERPL-SCNC: 148 MMOL/L — HIGH (ref 135–145)
WBC # BLD: 7.11 K/UL — SIGNIFICANT CHANGE UP (ref 3.8–10.5)
WBC # FLD AUTO: 7.11 K/UL — SIGNIFICANT CHANGE UP (ref 3.8–10.5)

## 2024-02-27 PROCEDURE — 99233 SBSQ HOSP IP/OBS HIGH 50: CPT | Mod: GC

## 2024-02-27 RX ORDER — ACETAMINOPHEN 500 MG
650 TABLET ORAL ONCE
Refills: 0 | Status: COMPLETED | OUTPATIENT
Start: 2024-02-27 | End: 2024-02-27

## 2024-02-27 RX ORDER — MAGNESIUM HYDROXIDE 400 MG/1
30 TABLET, CHEWABLE ORAL
Qty: 0 | Refills: 0 | DISCHARGE
Start: 2024-02-27

## 2024-02-27 RX ORDER — SENNA PLUS 8.6 MG/1
2 TABLET ORAL
Qty: 0 | Refills: 0 | DISCHARGE
Start: 2024-02-27

## 2024-02-27 RX ORDER — SODIUM CHLORIDE 9 MG/ML
1000 INJECTION, SOLUTION INTRAVENOUS
Refills: 0 | Status: DISCONTINUED | OUTPATIENT
Start: 2024-02-27 | End: 2024-02-28

## 2024-02-27 RX ADMIN — Medication 30 MILLILITER(S): at 10:00

## 2024-02-27 RX ADMIN — Medication 260 MILLIGRAM(S): at 20:45

## 2024-02-27 NOTE — PROGRESS NOTE ADULT - ASSESSMENT
89-year-old female past medical history of severe advanced dementia and HTN, presented to ED brought in by her daughter for right hip pain and left leg hematoma s/p a mechanical fall today. Admit intertrochanteric fracture of the right proximal femur. No

## 2024-02-27 NOTE — DISCHARGE NOTE NURSING/CASE MANAGEMENT/SOCIAL WORK - PATIENT PORTAL LINK FT
You can access the FollowMyHealth Patient Portal offered by [facility] by registering at the following website: [email or web address]. By joining [employer]’s FollowMyHealth portal, you will also be able to view your health information using other applications (apps) compatible with our system.

## 2024-02-27 NOTE — PROGRESS NOTE ADULT - SUBJECTIVE AND OBJECTIVE BOX
[facility] Cardiology Progress Note [phone] (Dr. Saba, Keely, Edgar, Dunia)    CHIEF COMPLAINT: Patient is a 89y old  Female who presents with a chief complaint of right femur fracture (27 Feb 2024 06:57)      Follow Up Today: The patient denies any chest discomfort or shortness of breath.    HPI:  89-year-old female past medical history of severe advanced dementia, HTN, presented to ED brought in by her daughter for right hip pain and left leg hematoma s/p a mechanical fall today. Unable to obtain history from pt as she received zyprexa and ativan prior to exam, history provided by daughter. At 11:30 today the pt was walking in the dining room and tripped on the table leg, falling on her right side. The daughter helped her up, but the pt was complaining of right leg pain and was unable to walk. Daughter denies head trauma. At baseline the pt is AOx1 and walks unassisted. The pt also hit her left leg on the table leg, and now has large hematoma there. Denies anticoagulation use.   Daughter denies hx reactions to anesthesia.     ED COURSE:  Vitals: T 98.7, HR 85, BP 99/63, RR 17, SpO2 94% on RA  Labs significant for: WBC 11.12, INR 1.04, Na 147,   Imaging:  CT HEAD: Mild periventricular white matter ischemia. Global atrophy most prominent in the BILATERAL temporal lobes. Chronic LEFT maxillary sinusitis.  CT cervical spine:   No vertebral fracture is recognized.  Straightening on a degenerative basis with grade 1 anterior spondylolisthesis at C7-T1 on a degenerative basis.   advanced degenerative disc disease and spondylosis at C5-6 and C6-7 with loss of disc height and associated degenerative endplate changes. Narrowing of the LEFT C4-5, BILATERAL C5-6 and C6/7 neural foramina due to uncovertebral spurring and facet osteophytic hypertrophy. Posterior osteophytic ridge/disc complexes at C5-6 and C6-7 flatten the ventral thecal sac  CT pelvis: Mild varus angulation of an intertrochanteric fracture of the right proximal femur.  Pt received: zyprexa 5 mg x2, Ativan 1 mg x1 (24 Feb 2024 19:22)      PAST MEDICAL & SURGICAL HISTORY:  HTN (hypertension)      Hallucinations  Auditory and visual (per daughter)      Left inguinal hernia      Dementia      Hiatal hernia      History of cholecystectomy      History of total abdominal hysterectomy      History of retinal detachment          MEDICATIONS  (STANDING):  acetaminophen   IVPB .. 650 milliGRAM(s) IV Intermittent once  enoxaparin Injectable 40 milliGRAM(s) SubCutaneous every 24 hours  polyethylene glycol 3350 17 Gram(s) Oral at bedtime  senna 2 Tablet(s) Oral at bedtime    MEDICATIONS  (PRN):  aluminum hydroxide/magnesium hydroxide/simethicone Suspension 30 milliLiter(s) Oral every 8 hours PRN Dyspepsia  magnesium hydroxide Suspension 30 milliLiter(s) Oral daily PRN Constipation  OLANZapine Injectable 2.5 milliGRAM(s) IntraMuscular every 6 hours PRN agitation  ondansetron Injectable 4 milliGRAM(s) IV Push every 6 hours PRN Nausea and/or Vomiting      Allergies    Demerol (Unknown)    Intolerances        REVIEW OF SYSTEMS:    All other review of systems is negative unless indicated above    Vital Signs Last 24 Hrs  T(C): 36.9 (27 Feb 2024 05:12), Max: 36.9 (27 Feb 2024 05:12)  T(F): 98.5 (27 Feb 2024 05:12), Max: 98.5 (27 Feb 2024 05:12)  HR: 72 (27 Feb 2024 05:12) (72 - 96)  BP: 95/55 (27 Feb 2024 05:12) (95/53 - 123/52)  BP(mean): --  RR: 18 (27 Feb 2024 05:12) (18 - 18)  SpO2: 92% (27 Feb 2024 05:12) (92% - 94%)    Parameters below as of 27 Feb 2024 05:12  Patient On (Oxygen Delivery Method): room air        I&O's Summary      PHYSICAL EXAM:    Constitutional: NAD, awake and alert, well-developed  Eyes:  EOMI,  Pupils round, No oral cyanosis.  HEENT: No exudate or erythema  Pulmonary: Non-labored, breath sounds are clear bilaterally, No wheezing, rales or rhonchi  Cardiovascular: Regular, S1 and S2, No murmurs  Gastrointestinal: Bowel Sounds present, soft, nontender.   Ext: No significant LE edema  Neurological: Alert, no gross focal motor deficits  Skin: No rashes.  Psych:  Mood & affect appropriate    LABS: All Labs Reviewed:                        12.4   10.94 )-----------( 113      ( 25 Feb 2024 12:21 )             36.9                         12.4   9.00  )-----------( 141      ( 25 Feb 2024 04:30 )             35.5                         13.5   11.12 )-----------( 153      ( 24 Feb 2024 18:15 )             40.3     25 Feb 2024 12:21    145    |  110    |  16     ----------------------------<  139    3.6     |  29     |  0.62   25 Feb 2024 04:30    149    |  113    |  20     ----------------------------<  114    3.8     |  31     |  0.62   24 Feb 2024 18:15    147    |  111    |  21     ----------------------------<  148    3.9     |  29     |  0.71     Ca    8.3        25 Feb 2024 12:21  Ca    8.6        25 Feb 2024 04:30  Ca    9.1        24 Feb 2024 18:15            Blood Culture:         RADIOLOGY/EKG:    Attending Attestation:   50 minutes spent on total encounter; more than 50% of the visit was spent counseling and/or coordinating care by the attending physician.     ASSESSMENT AND PLAN

## 2024-02-27 NOTE — PROGRESS NOTE ADULT - ASSESSMENT
89 year old female with a history of HTN, dementia who presents with a fall now POD 1 s/p RIGHT IMN Hip repair    TTE W or WO Ultrasound Enhancing Agent 2.25.24    1. Left ventricular wall thickness is mildly increased. Left ventricular systolic function is normal with an ejection fraction of 60 % by Jimenes's method of disks. There are no regional wall motion abnormalities seen.   2. The left atrium is mildly dilated.   3. Moderate tricuspid regurgitation.   4. Moderate pulmonary hypertension.      Plan:  - ECG with no evidence of ischemia or infarction  - CXR with grossly clear lungs  - Mild hypernatremia - possibly due to mild hypovolemia - continue IV fluids as needed  - No evidence of decompensated heart failure on exam  - DVT ppx, pain mgmt, PT eval   89 year old female with a history of HTN, dementia who presents with a fall now POD 2 s/p RIGHT IMN Hip repair.  Discussed with Dtr at bedside.  Awaiting PT and then home PT    TTE W or WO Ultrasound Enhancing Agent 2.25.24    1. Left ventricular wall thickness is mildly increased. Left ventricular systolic function is normal with an ejection fraction of 60 % by Jimenes's method of disks. There are no regional wall motion abnormalities seen.   2. The left atrium is mildly dilated.   3. Moderate tricuspid regurgitation.   4. Moderate pulmonary hypertension.      Plan:  - ECG with no evidence of ischemia or infarction  - CXR with grossly clear lungs  - Mild hypernatremia resolved- possibly due to mild hypovolemia - continue IV fluids as needed  - No evidence of decompensated heart failure on exam  - DVT ppx, pain mgmt, PT eval

## 2024-02-27 NOTE — CASE MANAGEMENT PROGRESS NOTE - NSCMPROGRESSNOTE_GEN_ALL_CORE
Spoke with 2 dtrs at bedside .Pt medically stable for DC  Dtrs want to take patient homevs [facility]. They have 7hrs/6day pvt  HHA in place and will increase hours if needed. Dtr Opal lives there and assists. They are receptive to explained [facility] services and referral sent to [facility] as requested.  They state patient has all needed DME . They also  requested a BLS ambulance for transport which was arranged for 12pm tmrw.

## 2024-02-27 NOTE — PROGRESS NOTE ADULT - SUBJECTIVE AND OBJECTIVE BOX
Patient is a 89y old  Female who presents with a chief complaint of right femur fracture (27 Feb 2024 07:01)  pt seen and examine today awake but confuse  , no fever , oob with 2 person assist , no resp distress.     INTERVAL HPI/OVERNIGHT EVENTS:     T(C): 36.9 (02-27-24 @ 05:12), Max: 36.9 (02-27-24 @ 05:12)  HR: 72 (02-27-24 @ 05:12) (72 - 96)  BP: 95/55 (02-27-24 @ 05:12) (95/53 - 123/52)  RR: 18 (02-27-24 @ 05:12) (18 - 18)  SpO2: 92% (02-27-24 @ 05:12) (92% - 94%)  Wt(kg): --  I&O's Summary        REVIEW OF SYSTEMS:  unable to obtain   sec to dementia     PHYSICAL EXAM:  GENERAL: NAD, weak   HEAD:  Atraumatic, Normocephalic  EYES: EOMI, PERRLA, conjunctiva and sclera clear  ENMT:   Moist mucous membranes  NECK: Supple, No JVD  NERVOUS SYSTEM:  Awake  & Oriented X0 ; Motor Strength 5/5 B/L upper and lower extremities; DTRs 2+ intact and symmetric  CHEST/LUNG:  percussion bilaterally; No rales, rhonchi, wheezing,   HEART: Regular rate and rhythm; No murmurs,  no tachy   ABDOMEN: Soft, Nontender, Nondistended; Bowel sounds present  EXTREMITIES:  2+ Peripheral Pulses, No clubbing, cyanosis, or edema ,   SKIN: No rashes or lesions lt lower leg  hematoma + , rt  leg  surgical site dry / clean     MEDICATIONS  (STANDING):  acetaminophen   IVPB .. 650 milliGRAM(s) IV Intermittent once  polyethylene glycol 3350 17 Gram(s) Oral at bedtime  senna 2 Tablet(s) Oral at bedtime    MEDICATIONS  (PRN):  aluminum hydroxide/magnesium hydroxide/simethicone Suspension 30 milliLiter(s) Oral every 8 hours PRN Dyspepsia  magnesium hydroxide Suspension 30 milliLiter(s) Oral daily PRN Constipation  OLANZapine Injectable 2.5 milliGRAM(s) IntraMuscular every 6 hours PRN agitation  ondansetron Injectable 4 milliGRAM(s) IV Push every 6 hours PRN Nausea and/or Vomiting      LABS:                        9.8    7.11  )-----------( 119      ( 27 Feb 2024 09:15 )             28.8     02-27    148<H>  |  113<H>  |  25<H>  ----------------------------<  120<H>  3.7   |  30  |  0.59    Ca    8.1<L>      27 Feb 2024 09:15        Urinalysis Basic - ( 27 Feb 2024 09:15 )    Color: x / Appearance: x / SG: x / pH: x  Gluc: 120 mg/dL / Ketone: x  / Bili: x / Urobili: x   Blood: x / Protein: x / Nitrite: x   Leuk Esterase: x / RBC: x / WBC x   Sq Epi: x / Non Sq Epi: x / Bacteria: x                    RADIOLOGY & ADDITIONAL TESTS:    Imaging Personally Reviewed:     no new test  Advance Directives:  full code    Palliative Care:  Appropriate

## 2024-02-27 NOTE — CONSULT NOTE ADULT - SUBJECTIVE AND OBJECTIVE BOX
This is a frail 89 y/o F who is s/p fall on Saturday with a hematoma at the LLE.  She has history venous stasis ulcers on both extremities  The patient is demented.  She recently had hip surgery.  As per the daughter who lives with her the hematoma has been stable since then.  No fevers, no chills

## 2024-02-27 NOTE — DISCHARGE NOTE NURSING/CASE MANAGEMENT/SOCIAL WORK - NSSCNAMETXT_GEN_ALL_CORE
[facility] [phone] or  [phone]  Nurse to visit the day after hospital discharge; physical therapist to follow. Please contact the home care agency at the above phone number if you have not heard from them by 12 noon on the day after your hospital discharge.

## 2024-02-27 NOTE — PROGRESS NOTE ADULT - SUBJECTIVE AND OBJECTIVE BOX
Pt seen and examined at bedside. Agitated overnight. Otherwise no acute events overnight. Unable to obtain subjective 2/2 dementia.    LABS:                        12.4   10.94 )-----------( 113      ( 25 Feb 2024 12:21 )             36.9     02-25    145  |  110<H>  |  16  ----------------------------<  139<H>  3.6   |  29  |  0.62    Ca    8.3<L>      25 Feb 2024 12:21        Urinalysis Basic - ( 25 Feb 2024 12:21 )    Color: x / Appearance: x / SG: x / pH: x  Gluc: 139 mg/dL / Ketone: x  / Bili: x / Urobili: x   Blood: x / Protein: x / Nitrite: x   Leuk Esterase: x / RBC: x / WBC x   Sq Epi: x / Non Sq Epi: x / Bacteria: x        VITAL SIGNS:  T(C): 36.9 (02-27-24 @ 05:12), Max: 36.9 (02-27-24 @ 05:12)  HR: 72 (02-27-24 @ 05:12) (72 - 96)  BP: 95/55 (02-27-24 @ 05:12) (95/53 - 123/52)  RR: 18 (02-27-24 @ 05:12) (18 - 18)  SpO2: 92% (02-27-24 @ 05:12) (92% - 94%)    Physical Exam:  General: NAD, resting comfortably in bed, minimally following command   RLE   Dressing C/D/I  SCDs present bilaterally  Compartments soft and compressible  No calf tenderness bilaterally  Grossly moving all extremities   2+ DP    A/P:  89y F POD 2 s/p R IMN  -PT/OT: Rec [facility]  -WBAT  -Pain Control  -DVT ppx: per primary, recommend Lovenox 40mg daily x 30 days postop  -Monitor LLE Hematoma, especially after DVT ppx started   -FU AM Labs  -Rest, ice, compress and elevate the extremity as we needed  -Incentive Spirometry  -Medical management appreciated    Discussed with Attending who is aware and agrees with above plan.    Pt seen and examined at bedside. Agitated overnight. Otherwise no acute events overnight. Unable to obtain subjective 2/2 dementia.    LABS:                        12.4   10.94 )-----------( 113      ( 25 Feb 2024 12:21 )             36.9     02-25    145  |  110<H>  |  16  ----------------------------<  139<H>  3.6   |  29  |  0.62    Ca    8.3<L>      25 Feb 2024 12:21        Urinalysis Basic - ( 25 Feb 2024 12:21 )    Color: x / Appearance: x / SG: x / pH: x  Gluc: 139 mg/dL / Ketone: x  / Bili: x / Urobili: x   Blood: x / Protein: x / Nitrite: x   Leuk Esterase: x / RBC: x / WBC x   Sq Epi: x / Non Sq Epi: x / Bacteria: x        VITAL SIGNS:  T(C): 36.9 (02-27-24 @ 05:12), Max: 36.9 (02-27-24 @ 05:12)  HR: 72 (02-27-24 @ 05:12) (72 - 96)  BP: 95/55 (02-27-24 @ 05:12) (95/53 - 123/52)  RR: 18 (02-27-24 @ 05:12) (18 - 18)  SpO2: 92% (02-27-24 @ 05:12) (92% - 94%)    Physical Exam:  General: NAD, resting comfortably in bed, minimally following command   RLE   Dressing C/D/I  SCDs present bilaterally  Compartments soft and compressible  No calf tenderness bilaterally  Grossly moving all extremities   2+ DP    A/P:  89y F POD 2 s/p R IMN  -PT/OT: Rec [facility]  -WBAT  -Pain Control  - HOLD DVT ppx per Dr. Levy; Discussed with Dr. Levy, who will Evaluate hematoma this afternoon.   -Monitor LLE Hematoma, especially after DVT ppx started   -FU AM Labs  -Rest, ice, compress and elevate the extremity as we needed  -Incentive Spirometry  -Medical management appreciated    Discussed with Attending who is aware and agrees with above plan.

## 2024-02-28 LAB
ANION GAP SERPL CALC-SCNC: 3 MMOL/L — LOW (ref 5–17)
BUN SERPL-MCNC: 22 MG/DL — SIGNIFICANT CHANGE UP (ref 7–23)
CALCIUM SERPL-MCNC: 8.4 MG/DL — LOW (ref 8.5–10.1)
CHLORIDE SERPL-SCNC: 114 MMOL/L — HIGH (ref 96–108)
CO2 SERPL-SCNC: 32 MMOL/L — HIGH (ref 22–31)
CREAT SERPL-MCNC: 0.52 MG/DL — SIGNIFICANT CHANGE UP (ref 0.5–1.3)
EGFR: 84 ML/MIN/1.73M2 — SIGNIFICANT CHANGE UP
GLUCOSE SERPL-MCNC: 122 MG/DL — HIGH (ref 70–99)
HCT VFR BLD CALC: 29 % — LOW (ref 34.5–45)
HGB BLD-MCNC: 9.8 G/DL — LOW (ref 11.5–15.5)
MCHC RBC-ENTMCNC: 32.7 PG — SIGNIFICANT CHANGE UP (ref 27–34)
MCHC RBC-ENTMCNC: 33.8 GM/DL — SIGNIFICANT CHANGE UP (ref 32–36)
MCV RBC AUTO: 96.7 FL — SIGNIFICANT CHANGE UP (ref 80–100)
NRBC # BLD: 0 /100 WBCS — SIGNIFICANT CHANGE UP (ref 0–0)
PLATELET # BLD AUTO: 143 K/UL — LOW (ref 150–400)
POTASSIUM SERPL-MCNC: 3.7 MMOL/L — SIGNIFICANT CHANGE UP (ref 3.5–5.3)
POTASSIUM SERPL-SCNC: 3.7 MMOL/L — SIGNIFICANT CHANGE UP (ref 3.5–5.3)
RBC # BLD: 3 M/UL — LOW (ref 3.8–5.2)
RBC # FLD: 12.8 % — SIGNIFICANT CHANGE UP (ref 10.3–14.5)
SODIUM SERPL-SCNC: 149 MMOL/L — HIGH (ref 135–145)
WBC # BLD: 5.39 K/UL — SIGNIFICANT CHANGE UP (ref 3.8–10.5)
WBC # FLD AUTO: 5.39 K/UL — SIGNIFICANT CHANGE UP (ref 3.8–10.5)

## 2024-02-28 PROCEDURE — 99233 SBSQ HOSP IP/OBS HIGH 50: CPT

## 2024-02-28 RX ORDER — SODIUM CHLORIDE 9 MG/ML
1000 INJECTION, SOLUTION INTRAVENOUS
Refills: 0 | Status: DISCONTINUED | OUTPATIENT
Start: 2024-02-28 | End: 2024-02-29

## 2024-02-28 RX ORDER — ENOXAPARIN SODIUM 100 MG/ML
40 INJECTION SUBCUTANEOUS ONCE
Refills: 0 | Status: COMPLETED | OUTPATIENT
Start: 2024-02-29 | End: 2024-02-29

## 2024-02-28 RX ORDER — ENOXAPARIN SODIUM 100 MG/ML
40 INJECTION SUBCUTANEOUS ONCE
Refills: 0 | Status: COMPLETED | OUTPATIENT
Start: 2024-02-28 | End: 2024-02-28

## 2024-02-28 RX ORDER — SODIUM CHLORIDE 9 MG/ML
1000 INJECTION, SOLUTION INTRAVENOUS
Refills: 0 | Status: DISCONTINUED | OUTPATIENT
Start: 2024-02-28 | End: 2024-02-28

## 2024-02-28 RX ADMIN — SENNA PLUS 2 TABLET(S): 8.6 TABLET ORAL at 21:56

## 2024-02-28 RX ADMIN — ENOXAPARIN SODIUM 40 MILLIGRAM(S): 100 INJECTION SUBCUTANEOUS at 12:53

## 2024-02-28 NOTE — PROGRESS NOTE ADULT - SUBJECTIVE AND OBJECTIVE BOX
Pt seen and examined at bedside. Otherwise no acute events overnight. Not consistently following command, agitated.     LABS:                        9.8    7.11  )-----------( 119      ( 27 Feb 2024 09:15 )             28.8     02-27    148<H>  |  113<H>  |  25<H>  ----------------------------<  120<H>  3.7   |  30  |  0.59    Ca    8.1<L>      27 Feb 2024 09:15        Urinalysis Basic - ( 27 Feb 2024 09:15 )    Color: x / Appearance: x / SG: x / pH: x  Gluc: 120 mg/dL / Ketone: x  / Bili: x / Urobili: x   Blood: x / Protein: x / Nitrite: x   Leuk Esterase: x / RBC: x / WBC x   Sq Epi: x / Non Sq Epi: x / Bacteria: x        VITAL SIGNS:  T(C): 36.5 (02-28-24 @ 05:35), Max: 37.1 (02-27-24 @ 20:30)  HR: 87 (02-28-24 @ 05:35) (75 - 87)  BP: 137/73 (02-28-24 @ 05:35) (115/62 - 155/68)  RR: 18 (02-28-24 @ 05:35) (17 - 18)  SpO2: 95% (02-28-24 @ 05:35) (94% - 96%)    Physical Exam:  General: NAD, resting comfortably in bed, minimally following command   RLE   Dressing C/D/I  SCDs present bilaterally  Compartments soft and compressible  No calf tenderness bilaterally  Grossly moving all extremities   2+ DP    A/P:  89y F POD 3 s/p R IMN  -PT/OT: Rec [facility]  -WBAT  -Pain Control  -Cont To Hold DVT ppx, per Plastic Sx.  -Cont to monitor hematoma, after aspirated by Plastic Sx on 2/27  -FU AM Labs  -Rest, ice, compress and elevate the extremity as we needed  -Incentive Spirometry  -Medical management appreciated    Discussed with Attending who is aware and agrees with above plan.    Pt seen and examined at bedside. Otherwise no acute events overnight. Not consistently following command, agitated.     LABS:                        9.8    7.11  )-----------( 119      ( 27 Feb 2024 09:15 )             28.8     02-27    148<H>  |  113<H>  |  25<H>  ----------------------------<  120<H>  3.7   |  30  |  0.59    Ca    8.1<L>      27 Feb 2024 09:15        Urinalysis Basic - ( 27 Feb 2024 09:15 )    Color: x / Appearance: x / SG: x / pH: x  Gluc: 120 mg/dL / Ketone: x  / Bili: x / Urobili: x   Blood: x / Protein: x / Nitrite: x   Leuk Esterase: x / RBC: x / WBC x   Sq Epi: x / Non Sq Epi: x / Bacteria: x        VITAL SIGNS:  T(C): 36.5 (02-28-24 @ 05:35), Max: 37.1 (02-27-24 @ 20:30)  HR: 87 (02-28-24 @ 05:35) (75 - 87)  BP: 137/73 (02-28-24 @ 05:35) (115/62 - 155/68)  RR: 18 (02-28-24 @ 05:35) (17 - 18)  SpO2: 95% (02-28-24 @ 05:35) (94% - 96%)    Physical Exam:  General: NAD, resting comfortably in bed, minimally following command   RLE   Dressing C/D/I  SCDs present bilaterally  Compartments soft and compressible  No calf tenderness bilaterally  Grossly moving all extremities   2+ DP    A/P:  89y F POD 3 s/p R IMN  -PT/OT: Rec [facility]  -WBAT  -Pain Control  -Cont To Hold DVT ppx, per Plastic Sx.  -Cont to monitor hematoma, after aspirated by Plastic Sx on 2/27  -FU AM Labs  -Rest, ice, compress and elevate the extremity as we needed  -Incentive Spirometry  -Medical management appreciated    No acute orthopaedic surgical intervention indicated at this time. This patient is orthopaedically stable for discharge.   Patient to follow up with Dr. Novoa as an outpatient for further evaluation and management.   All of the patient's questions and concerns were answered and addressed.    Discussed with Attending who is aware and agrees with above plan.

## 2024-02-28 NOTE — CASE MANAGEMENT PROGRESS NOTE - NSCMPROGRESSNOTE_GEN_ALL_CORE
Patients dtr now requesting patient DC to [facility] rather than home . Social work to follow for same. CM to remain available.

## 2024-02-28 NOTE — SOCIAL WORK PROGRESS NOTE - NSSWPROGRESSNOTE_GEN_ALL_CORE
Pt's daughter Opal is now requesting [facility]UD[facility] for pt. Choice obtained CI. Awaiting for additional choices. MEG requested.

## 2024-02-28 NOTE — PROGRESS NOTE ADULT - SUBJECTIVE AND OBJECTIVE BOX
Patient is a 89y old  Female who presents with a chief complaint of right femur fracture (28 Feb 2024 09:22)      Subjective:  INTERVAL HPI/OVERNIGHT EVENTS: Patient seen and examined at bedside.  PatienT C/O LLE during dressing change   MEDICATIONS  (STANDING):  acetaminophen   IVPB .. 650 milliGRAM(s) IV Intermittent once  dextrose 5%. 1000 milliLiter(s) (80 mL/Hr) IV Continuous <Continuous>  polyethylene glycol 3350 17 Gram(s) Oral at bedtime  senna 2 Tablet(s) Oral at bedtime    MEDICATIONS  (PRN):  aluminum hydroxide/magnesium hydroxide/simethicone Suspension 30 milliLiter(s) Oral every 8 hours PRN Dyspepsia  magnesium hydroxide Suspension 30 milliLiter(s) Oral daily PRN Constipation  OLANZapine Injectable 2.5 milliGRAM(s) IntraMuscular every 6 hours PRN agitation  ondansetron Injectable 4 milliGRAM(s) IV Push every 6 hours PRN Nausea and/or Vomiting      Allergies    Demerol (Unknown)    Intolerances        REVIEW OF SYSTEMS:  CONSTITUTIONAL: No fever or chills  HEENT:  No headache, no sore throat  RESPIRATORY: No cough or shortness of breath  CARDIOVASCULAR: No chest pain or palpitations  GASTROINTESTINAL: No abd pain, nausea, vomiting, or diarrhea      Objective:  Vital Signs Last 24 Hrs  T(C): 36.3 (28 Feb 2024 13:01), Max: 37.1 (27 Feb 2024 20:30)  T(F): 97.4 (28 Feb 2024 13:01), Max: 98.8 (27 Feb 2024 20:30)  HR: 81 (28 Feb 2024 13:01) (75 - 87)  BP: 118/62 (28 Feb 2024 13:01) (116/64 - 155/68)  BP(mean): --  RR: 17 (28 Feb 2024 13:01) (17 - 18)  SpO2: 94% (28 Feb 2024 13:01) (94% - 96%)    Parameters below as of 28 Feb 2024 13:01  Patient On (Oxygen Delivery Method): room air        GENERAL: NAD, lying in bed comfortably  HEAD:  Normocephalic  EYES:  conjunctiva and sclera clear  ENT: Moist mucous membranes  NECK: Supple  CHEST/LUNG: Clear to auscultation bilaterally; No rales or rhonchi; no wheezing. Unlabored respirations  HEART: Regular rate and rhythm; S1S2+  ABDOMEN: Bowel sounds present; Soft, Nontender, Nondistended.   EXTREMITIES:  + distal Peripheral Pulses;  No cyanosis, or edema, LLE hematoma improved, no active bleeding.   NERVOUS SYSTEM:  Alert & Oriented X1;  No gross focal deficits   MSK: moves all extremities, demented   SKIN: No rashes     LABS:                        9.8    5.39  )-----------( 143      ( 28 Feb 2024 07:53 )             29.0     28 Feb 2024 07:53    149    |  114    |  22     ----------------------------<  122    3.7     |  32     |  0.52     Ca    8.4        28 Feb 2024 07:53        Urinalysis Basic - ( 28 Feb 2024 07:53 )    Color: x / Appearance: x / SG: x / pH: x  Gluc: 122 mg/dL / Ketone: x  / Bili: x / Urobili: x   Blood: x / Protein: x / Nitrite: x   Leuk Esterase: x / RBC: x / WBC x   Sq Epi: x / Non Sq Epi: x / Bacteria: x      CAPILLARY BLOOD GLUCOSE              RADIOLOGY & ADDITIONAL TESTS:    Personally reviewed.     Consultant(s) Notes Reviewed:  [x] YES  [ ] NO    Plan of care discussed with patient /family daughter at bedside ; all questions answered

## 2024-02-28 NOTE — CONSULT NOTE ADULT - SUBJECTIVE AND OBJECTIVE BOX
Patient is a 89y old  Female who presents with a chief complaint of right femur fracture (28 Feb 2024 07:01)    HPI:  89-year-old female past medical history of severe advanced dementia, HTN, presented to ED brought in by her daughter for right hip pain and left leg hematoma s/p a mechanical fall today. Unable to obtain history from pt as she received zyprexa and ativan prior to exam, history provided by daughter. At 11:30 today the pt was walking in the dining room and tripped on the table leg, falling on her right side. The daughter helped her up, but the pt was complaining of right leg pain and was unable to walk. Daughter denies head trauma. At baseline the pt is AOx1 and walks unassisted. The pt also hit her left leg on the table leg, and now has large hematoma there. Denies anticoagulation use.   Daughter denies hx reactions to anesthesia.     ED COURSE:  Vitals: T 98.7, HR 85, BP 99/63, RR 17, SpO2 94% on RA  Labs significant for: WBC 11.12, INR 1.04, Na 147,   Imaging:  CT HEAD: Mild periventricular white matter ischemia. Global atrophy most prominent in the BILATERAL temporal lobes. Chronic LEFT maxillary sinusitis.  CT cervical spine:   No vertebral fracture is recognized.  Straightening on a degenerative basis with grade 1 anterior spondylolisthesis at C7-T1 on a degenerative basis.   advanced degenerative disc disease and spondylosis at C5-6 and C6-7 with loss of disc height and associated degenerative endplate changes. Narrowing of the LEFT C4-5, BILATERAL C5-6 and C6/7 neural foramina due to uncovertebral spurring and facet osteophytic hypertrophy. Posterior osteophytic ridge/disc complexes at C5-6 and C6-7 flatten the ventral thecal sac  CT pelvis: Mild varus angulation of an intertrochanteric fracture of the right proximal femur.  Pt received: zyprexa 5 mg x2, Ativan 1 mg x1 (24 Feb 2024 19:22)    Renal consult called for hypernatremia. History obtained from chart.       PAST MEDICAL HISTORY:  HTN (hypertension)    Hallucinations    Left inguinal hernia    Dementia    Hiatal hernia        PAST SURGICAL HISTORY:  History of cholecystectomy    History of total abdominal hysterectomy    History of retinal detachment        FAMILY HISTORY:  FH: CVA (cerebrovascular accident) (Father)    FH: HTN (hypertension) (Father)    FH: liver cancer (Father)        SOCIAL HISTORY: No smoking or alcohol use     Allergies    Demerol (Unknown)    Intolerances      Home Medications:  magnesium hydroxide 8% oral suspension: 30 milliliter(s) orally once a day As needed Constipation (27 Feb 2024 11:56)  melatonin 3 mg oral tablet: 1 tab(s) orally once a day as needed for  insomnia (24 Feb 2024 19:34)  senna leaf extract oral tablet: 2 tab(s) orally once a day (at bedtime) (27 Feb 2024 11:56)  Xanax 0.25 mg oral tablet: 0.5 tab(s) orally once a day as needed for  agitation (24 Feb 2024 19:34)    MEDICATIONS  (STANDING):  acetaminophen   IVPB .. 650 milliGRAM(s) IV Intermittent once  dextrose 5%. 1000 milliLiter(s) (70 mL/Hr) IV Continuous <Continuous>  polyethylene glycol 3350 17 Gram(s) Oral at bedtime  senna 2 Tablet(s) Oral at bedtime    MEDICATIONS  (PRN):  aluminum hydroxide/magnesium hydroxide/simethicone Suspension 30 milliLiter(s) Oral every 8 hours PRN Dyspepsia  magnesium hydroxide Suspension 30 milliLiter(s) Oral daily PRN Constipation  OLANZapine Injectable 2.5 milliGRAM(s) IntraMuscular every 6 hours PRN agitation  ondansetron Injectable 4 milliGRAM(s) IV Push every 6 hours PRN Nausea and/or Vomiting      REVIEW OF SYSTEMS:  General: no distress  unable to obtain    T(F): 97.7 (02-28-24 @ 05:35), Max: 98.8 (02-27-24 @ 20:30)  HR: 87 (02-28-24 @ 05:35) (75 - 87)  BP: 137/73 (02-28-24 @ 05:35) (115/62 - 155/68)  RR: 18 (02-28-24 @ 05:35) (17 - 18)  SpO2: 95% (02-28-24 @ 05:35) (94% - 96%)  Wt(kg): --    PHYSICAL EXAM:  General: NAD  Respiratory: b/l air entry  Cardiovascular: S1 S2  Gastrointestinal: soft  Extremities: no edema, leg dressing        02-28    149<H>  |  114<H>  |  22  ----------------------------<  122<H>  3.7   |  32<H>  |  0.52    Ca    8.4<L>      28 Feb 2024 07:53                            9.8    5.39  )-----------( 143      ( 28 Feb 2024 07:53 )             29.0       Potassium: 3.7 mmol/L (02-28 @ 07:53)  Blood Urea Nitrogen: 22 mg/dL (02-28 @ 07:53)  Calcium: 8.4 mg/dL (02-28 @ 07:53)  Hemoglobin: 9.8 g/dL (02-28 @ 07:53)      Creatinine, Serum: 0.52 (02-28 @ 07:53)  Creatinine, Serum: 0.59 (02-27 @ 09:15)  Creatinine, Serum: 0.62 (02-25 @ 12:21)      Urinalysis Basic - ( 28 Feb 2024 07:53 )    Color: x / Appearance: x / SG: x / pH: x  Gluc: 122 mg/dL / Ketone: x  / Bili: x / Urobili: x   Blood: x / Protein: x / Nitrite: x   Leuk Esterase: x / RBC: x / WBC x   Sq Epi: x / Non Sq Epi: x / Bacteria: x          < from: Xray Chest 1 View- PORTABLE-Urgent (Xray Chest 1 View- PORTABLE-Urgent .) (02.24.24 @ 19:08) >    ACC: [id number] EXAM:  XR CHEST PORTABLE URGENT 1V   ORDERED BY: BEREKET TEE     PROCEDURE DATE:  02/24/2024          INTERPRETATION:  Clinical history preop    Comparison 11/19/2022    A single frontal view of the chest demonstrates no evidence ofeffusion,   consolidation or pneumothorax. The patient is rotated toward the left.    IMPRESSION:    No acute pulmonary disease.    --- End of Report ---            KAYLEIGH IRELAND MD; Attending Radiologist  This document has been electronically signed. Feb 25 2024  5:48PM    < end of copied text >  < from: Xray Femur 2 Views, Right (02.24.24 @ 19:09) >    ACC: [id number] EXAM:  XR KNEE AP LAT OBL 3 VIEWS RT   ORDERED BY: MART BRADY     ACC: [id number] EXAM:  XR FEMUR 2 VIEWS RT   ORDERED BY: MART BRADY     ACC: [id number] EXAM:  XR HIP WITH PELV 2-3V RT   ORDERED BY: MART DEL ANGEL   JOSE ANTONIO     ACC: [id number] EXAM:  XR TIB FIB AP LAT 2 VIEWS RT   ORDERED BY: BEREKET TEE     PROCEDURE DATE:  02/24/2024          INTERPRETATION:  Pelvis and right hip with 2 views of the right femur, 2   views of the right knee and 2 views of the tib-fib    CLINICAL HISTORY: Status post fall    IMPRESSION: The bone is diffusely demineralized. A right   intertrochanteric hip fracture in varus deformity is apparent. The   remainder of the right femur is otherwise intact. Chondrocalcinosis is   apparent at the knee joint. The right knee and right tib-fib is otherwise   unremarkable.    Vascular calcifications are present.    --- End of Report ---            JESSICA ARCINIEGA MD; Attending Radiologist  This document has been electronically signed.Feb 25 2024 10:10AM    < end of copied text >

## 2024-02-28 NOTE — PROGRESS NOTE ADULT - SUBJECTIVE AND OBJECTIVE BOX
Patient is a 89y Female with a known history of :  Fracture of proximal end of right femur [S72.001A]    Dementia [F03.90]    Hematoma [T14.8XXA]    Need for prophylactic measure [Z29.9]    Leukocytosis [D72.829]      HPI:  89-year-old female past medical history of severe advanced dementia, HTN, presented to ED brought in by her daughter for right hip pain and left leg hematoma s/p a mechanical fall today. Unable to obtain history from pt as she received zyprexa and ativan prior to exam, history provided by daughter. At 11:30 today the pt was walking in the dining room and tripped on the table leg, falling on her right side. The daughter helped her up, but the pt was complaining of right leg pain and was unable to walk. Daughter denies head trauma. At baseline the pt is AOx1 and walks unassisted. The pt also hit her left leg on the table leg, and now has large hematoma there. Denies anticoagulation use.   Daughter denies hx reactions to anesthesia.     ED COURSE:  Vitals: T 98.7, HR 85, BP 99/63, RR 17, SpO2 94% on RA  Labs significant for: WBC 11.12, INR 1.04, Na 147,   Imaging:  CT HEAD: Mild periventricular white matter ischemia. Global atrophy most prominent in the BILATERAL temporal lobes. Chronic LEFT maxillary sinusitis.  CT cervical spine:   No vertebral fracture is recognized.  Straightening on a degenerative basis with grade 1 anterior spondylolisthesis at C7-T1 on a degenerative basis.   advanced degenerative disc disease and spondylosis at C5-6 and C6-7 with loss of disc height and associated degenerative endplate changes. Narrowing of the LEFT C4-5, BILATERAL C5-6 and C6/7 neural foramina due to uncovertebral spurring and facet osteophytic hypertrophy. Posterior osteophytic ridge/disc complexes at C5-6 and C6-7 flatten the ventral thecal sac  CT pelvis: Mild varus angulation of an intertrochanteric fracture of the right proximal femur.  Pt received: zyprexa 5 mg x2, Ativan 1 mg x1 (24 Feb 2024 19:22)      REVIEW OF SYSTEMS:  General: No fevers, chills, weight gain  Skin: No rashes, color changes  Cardiovascular: No chest pain, orthopnea  Respiratory: No shortness of breath, cough  Gastrointestinal: No nausea, abdominal pain  Genitourinary: No incontinence, pain with urination  Musculoskeletal: No pain, swelling, decreased range of motion  Neurological: No headache, weakness  Psychiatric: No depression, anxiety  Endocrine: No weight gain, increased thirst  All other systems are comprehensively negative.      MEDICATIONS  (STANDING):  acetaminophen   IVPB .. 650 milliGRAM(s) IV Intermittent once  dextrose 5%. 1000 milliLiter(s) (80 mL/Hr) IV Continuous <Continuous>  polyethylene glycol 3350 17 Gram(s) Oral at bedtime  senna 2 Tablet(s) Oral at bedtime    MEDICATIONS  (PRN):  aluminum hydroxide/magnesium hydroxide/simethicone Suspension 30 milliLiter(s) Oral every 8 hours PRN Dyspepsia  magnesium hydroxide Suspension 30 milliLiter(s) Oral daily PRN Constipation  OLANZapine Injectable 2.5 milliGRAM(s) IntraMuscular every 6 hours PRN agitation  ondansetron Injectable 4 milliGRAM(s) IV Push every 6 hours PRN Nausea and/or Vomiting      ALLERGIES: Demerol (Unknown)      FAMILY HISTORY:  FH: CVA (cerebrovascular accident) (Father)    FH: HTN (hypertension) (Father)    FH: liver cancer (Father)        PHYSICAL EXAMINATION:  -----------------------------  T(C): 36.3 (02-28-24 @ 13:01), Max: 37.1 (02-27-24 @ 20:30)  HR: 81 (02-28-24 @ 13:01) (75 - 87)  BP: 118/62 (02-28-24 @ 13:01) (116/64 - 155/68)  RR: 17 (02-28-24 @ 13:01) (17 - 18)  SpO2: 94% (02-28-24 @ 13:01) (94% - 96%)  Wt(kg): --    PHYSICAL EXAM:    Constitutional: NAD, awake and alert, well-developed  Eyes:  EOMI,  Pupils round, No oral cyanosis.  HEENT: No exudate or erythema  Pulmonary: Non-labored, breath sounds are clear bilaterally, No wheezing, rales or rhonchi  Cardiovascular: Regular, S1 and S2, No murmurs  Gastrointestinal: Bowel Sounds present, soft, nontender.   Ext: No significant LE edema  Neurological: Alert, no gross focal motor deficits  Skin: No rashes.  Psych:  Mood & affect appropriate    LABS: All Labs Reviewed:        LABS:   --------  02-28    149<H>  |  114<H>  |  22  ----------------------------<  122<H>  3.7   |  32<H>  |  0.52    Ca    8.4<L>      28 Feb 2024 07:53                           9.8    5.39  )-----------( 143      ( 28 Feb 2024 07:53 )             29.0

## 2024-02-28 NOTE — PROGRESS NOTE ADULT - ASSESSMENT
89 year old female with a history of HTN, dementia who presents with a fall now POD 3 s/p RIGHT IMN Hip repair.    Pt easily arousible although confused and agitated.   Discussed with Dtr at bedside.  Awaiting PT and then home PT    TTE W or WO Ultrasound Enhancing Agent 2.25.24    1. Left ventricular wall thickness is mildly increased. Left ventricular systolic function is normal with an ejection fraction of 60 % by Jimenes's method of disks. There are no regional wall motion abnormalities seen.   2. The left atrium is mildly dilated.   3. Moderate tricuspid regurgitation.   4. Moderate pulmonary hypertension.      Plan:  - ECG with no evidence of ischemia or infarction  - CXR with grossly clear lungs  - Mild hypernatremia with Na increased to 149 again - continue IV fluids as needed  - No evidence of decompensated heart failure on exam  - DVT ppx, pain mgmt, PT eval

## 2024-02-28 NOTE — CONSULT NOTE ADULT - ASSESSMENT
The patient is a 89 year old female with a history of HTN, dementia who presents with a fall.    Plan:  - ECG with no evidence of ischemia or infarction  - CXR with grossly clear lungs  - Mild hypernatremia - possibly due to mild hypovolemia - continue IV fluids as needed  - No evidence of decompensated heart failure on exam  - The patient is at intermediate risk for cardiac events for an intermediate risk surgery. The patient is optimized to proceed for orthopedic surgery from a cardiac standpoint.
Hypernatremia: secondary to decreased free water intake  Hypertension  Anemia  s/p fall, Rt hip fracture, s/p surgery    Continue IV hydration. Increase rate. Increase PO fluid intake as tolerated.   Monitor BP trend. Titrate BP meds as needed. Salt restriction. Monitor h/h trend. D/w pt's daughter at bedside.   Will follow electrolytes and renal function trend.     Further recommendations pending clinical course. Thank you for the courtesy of this referral. 
PE:  89 y/o very frail, demented patient not oriented to time or space.  The daughter is at the bedside  There is a hematoma about the left anterior leg with chronic venous stasis ulcers.  No signs of infection.  The hematoma is soft.  All compartments are soft.  Viable skin, viable lower extremity.  Very small area of drainage from the lateral aspect of the wound.    A/P  89 y/o Frail female as above.  The hematoma has been stable and not enlarging.  The patient is demented and does not co-operate and does not stay still for a formal bedside I&D.  I discussed her options with her daughter. Given her advance age and overall status we will  proceed with conservative management and try only needle aspiration at the bedside.  Risks were explained including infection, bleeding, need for further procedures in the future, loss of extremity.  All questions were answered and informed consent was obtained.      Procedure note:    The hematoma was preped and cleaned with betadine.  Under sterile conditions and using an 18 G needle part of the hematoma was aspirated, Approximately 5ml were aspirated.  It remains soft.  The leg was wrapped with ABD, kerlex and ACE.  The pt tolerated that well.  Keep LLE elevated.    The patient has transportation issues and a visiting doctor will see the area end of this week.  I explained to the family that if there is infection of if it gets worse the patient would need to go to the ER  for further evaluation and management.    The patient is scheduled to be discharged tomorrow.  Recommend additional 24 hours observation and discharge this Thursday.

## 2024-02-29 LAB
ANION GAP SERPL CALC-SCNC: 6 MMOL/L — SIGNIFICANT CHANGE UP (ref 5–17)
BASOPHILS # BLD AUTO: 0.03 K/UL — SIGNIFICANT CHANGE UP (ref 0–0.2)
BASOPHILS NFR BLD AUTO: 0.5 % — SIGNIFICANT CHANGE UP (ref 0–2)
BUN SERPL-MCNC: 16 MG/DL — SIGNIFICANT CHANGE UP (ref 7–23)
CALCIUM SERPL-MCNC: 8.1 MG/DL — LOW (ref 8.5–10.1)
CHLORIDE SERPL-SCNC: 110 MMOL/L — HIGH (ref 96–108)
CO2 SERPL-SCNC: 31 MMOL/L — SIGNIFICANT CHANGE UP (ref 22–31)
CREAT SERPL-MCNC: 0.48 MG/DL — LOW (ref 0.5–1.3)
EGFR: 86 ML/MIN/1.73M2 — SIGNIFICANT CHANGE UP
EOSINOPHIL # BLD AUTO: 0 K/UL — SIGNIFICANT CHANGE UP (ref 0–0.5)
EOSINOPHIL NFR BLD AUTO: 0 % — SIGNIFICANT CHANGE UP (ref 0–6)
GLUCOSE SERPL-MCNC: 109 MG/DL — HIGH (ref 70–99)
HCT VFR BLD CALC: 28.8 % — LOW (ref 34.5–45)
HGB BLD-MCNC: 9.6 G/DL — LOW (ref 11.5–15.5)
IMM GRANULOCYTES NFR BLD AUTO: 0.5 % — SIGNIFICANT CHANGE UP (ref 0–0.9)
LYMPHOCYTES # BLD AUTO: 0.88 K/UL — LOW (ref 1–3.3)
LYMPHOCYTES # BLD AUTO: 15.2 % — SIGNIFICANT CHANGE UP (ref 13–44)
MCHC RBC-ENTMCNC: 32.1 PG — SIGNIFICANT CHANGE UP (ref 27–34)
MCHC RBC-ENTMCNC: 33.3 GM/DL — SIGNIFICANT CHANGE UP (ref 32–36)
MCV RBC AUTO: 96.3 FL — SIGNIFICANT CHANGE UP (ref 80–100)
MONOCYTES # BLD AUTO: 0.34 K/UL — SIGNIFICANT CHANGE UP (ref 0–0.9)
MONOCYTES NFR BLD AUTO: 5.9 % — SIGNIFICANT CHANGE UP (ref 2–14)
NEUTROPHILS # BLD AUTO: 4.5 K/UL — SIGNIFICANT CHANGE UP (ref 1.8–7.4)
NEUTROPHILS NFR BLD AUTO: 77.9 % — HIGH (ref 43–77)
NRBC # BLD: 0 /100 WBCS — SIGNIFICANT CHANGE UP (ref 0–0)
PLATELET # BLD AUTO: 161 K/UL — SIGNIFICANT CHANGE UP (ref 150–400)
POTASSIUM SERPL-MCNC: 3.7 MMOL/L — SIGNIFICANT CHANGE UP (ref 3.5–5.3)
POTASSIUM SERPL-SCNC: 3.7 MMOL/L — SIGNIFICANT CHANGE UP (ref 3.5–5.3)
RBC # BLD: 2.99 M/UL — LOW (ref 3.8–5.2)
RBC # FLD: 12.7 % — SIGNIFICANT CHANGE UP (ref 10.3–14.5)
SODIUM SERPL-SCNC: 147 MMOL/L — HIGH (ref 135–145)
WBC # BLD: 5.78 K/UL — SIGNIFICANT CHANGE UP (ref 3.8–10.5)
WBC # FLD AUTO: 5.78 K/UL — SIGNIFICANT CHANGE UP (ref 3.8–10.5)

## 2024-02-29 PROCEDURE — 99233 SBSQ HOSP IP/OBS HIGH 50: CPT

## 2024-02-29 RX ORDER — QUETIAPINE FUMARATE 200 MG/1
12.5 TABLET, FILM COATED ORAL AT BEDTIME
Refills: 0 | Status: DISCONTINUED | OUTPATIENT
Start: 2024-02-29 | End: 2024-03-01

## 2024-02-29 RX ORDER — ACETAMINOPHEN 500 MG
650 TABLET ORAL EVERY 6 HOURS
Refills: 0 | Status: DISCONTINUED | OUTPATIENT
Start: 2024-02-29 | End: 2024-03-01

## 2024-02-29 RX ORDER — SODIUM CHLORIDE 9 MG/ML
1000 INJECTION, SOLUTION INTRAVENOUS
Refills: 0 | Status: DISCONTINUED | OUTPATIENT
Start: 2024-02-29 | End: 2024-03-01

## 2024-02-29 RX ORDER — QUETIAPINE FUMARATE 200 MG/1
12.5 TABLET, FILM COATED ORAL ONCE
Refills: 0 | Status: COMPLETED | OUTPATIENT
Start: 2024-02-29 | End: 2024-02-29

## 2024-02-29 RX ORDER — ACETAMINOPHEN 500 MG
650 TABLET ORAL ONCE
Refills: 0 | Status: COMPLETED | OUTPATIENT
Start: 2024-02-29 | End: 2024-02-29

## 2024-02-29 RX ADMIN — QUETIAPINE FUMARATE 12.5 MILLIGRAM(S): 200 TABLET, FILM COATED ORAL at 12:14

## 2024-02-29 RX ADMIN — POLYETHYLENE GLYCOL 3350 17 GRAM(S): 17 POWDER, FOR SOLUTION ORAL at 22:37

## 2024-02-29 RX ADMIN — ENOXAPARIN SODIUM 40 MILLIGRAM(S): 100 INJECTION SUBCUTANEOUS at 12:14

## 2024-02-29 RX ADMIN — SENNA PLUS 2 TABLET(S): 8.6 TABLET ORAL at 22:36

## 2024-02-29 RX ADMIN — Medication 650 MILLIGRAM(S): at 19:30

## 2024-02-29 RX ADMIN — MAGNESIUM HYDROXIDE 30 MILLILITER(S): 400 TABLET, CHEWABLE ORAL at 18:28

## 2024-02-29 RX ADMIN — QUETIAPINE FUMARATE 12.5 MILLIGRAM(S): 200 TABLET, FILM COATED ORAL at 22:36

## 2024-02-29 NOTE — PROGRESS NOTE ADULT - PROBLEM SELECTOR PLAN 1
- CT pelvis: Mild varus angulation of an intertrochanteric fracture of the right proximal femur  - Ortho consulted and s/p right hip IMN on 2/25  - PT consult/ home vs catherine   - Pain control (though daughter wishes to avoid opioids, will revisit if Tylenol is not adequate)
- CT pelvis: Mild varus angulation of an intertrochanteric fracture of the right proximal femur  - Ortho consulted and s/p right hip IMN on 2/25  - PT consult/ home vs catherine   - Pain control (though daughter wishes to avoid opioids, will revisit if Tylenol is not adequate)
- CT pelvis: Mild varus angulation of an intertrochanteric fracture of the right proximal femur  - Ortho consulted and s/p right hip IMN on 2/25  - PT consult  - Pain control (though daughter wishes to avoid opioids, will revisit if Tylenol is not adequate)
- CT pelvis: Mild varus angulation of an intertrochanteric fracture of the right proximal femur  - Ortho consulted and s/p right hip IMN on 2/25  - PT consult- CLAUDIA   - Pain management as current  - started lovenox
- CT pelvis: Mild varus angulation of an intertrochanteric fracture of the right proximal femur  - Ortho consulted and s/p right hip IMN on 2/25  - PT consult- CLAUDIA   - Pain management as current  - started lovenox

## 2024-02-29 NOTE — PROGRESS NOTE ADULT - PROBLEM SELECTOR PROBLEM 1
Fracture of proximal end of right femur

## 2024-02-29 NOTE — PROGRESS NOTE ADULT - SUBJECTIVE AND OBJECTIVE BOX
[facility] Cardiology    CHIEF COMPLAINT: Patient is a 89y old  Female who presents with a chief complaint of right femur fracture (28 Feb 2024 19:40)      Follow Up: [ ] Chest Pain      [ ] Dyspnea     [ ] Palpitations    [ ] Atrial Fibrillation     [ ] Ventricular Dysrhythmia    [ ] Abnormal EKG                      [ ] Abnormal Cardiac Enzymes     [ ] Valvular Disease    HPI:  89-year-old female past medical history of severe advanced dementia, HTN, presented to ED brought in by her daughter for right hip pain and left leg hematoma s/p a mechanical fall today. Unable to obtain history from pt as she received zyprexa and ativan prior to exam, history provided by daughter. At 11:30 today the pt was walking in the dining room and tripped on the table leg, falling on her right side. The daughter helped her up, but the pt was complaining of right leg pain and was unable to walk. Daughter denies head trauma. At baseline the pt is AOx1 and walks unassisted. The pt also hit her left leg on the table leg, and now has large hematoma there. Denies anticoagulation use.   Daughter denies hx reactions to anesthesia.     ED COURSE:  Vitals: T 98.7, HR 85, BP 99/63, RR 17, SpO2 94% on RA  Labs significant for: WBC 11.12, INR 1.04, Na 147,   Imaging:  CT HEAD: Mild periventricular white matter ischemia. Global atrophy most prominent in the BILATERAL temporal lobes. Chronic LEFT maxillary sinusitis.  CT cervical spine:   No vertebral fracture is recognized.  Straightening on a degenerative basis with grade 1 anterior spondylolisthesis at C7-T1 on a degenerative basis.   advanced degenerative disc disease and spondylosis at C5-6 and C6-7 with loss of disc height and associated degenerative endplate changes. Narrowing of the LEFT C4-5, BILATERAL C5-6 and C6/7 neural foramina due to uncovertebral spurring and facet osteophytic hypertrophy. Posterior osteophytic ridge/disc complexes at C5-6 and C6-7 flatten the ventral thecal sac  CT pelvis: Mild varus angulation of an intertrochanteric fracture of the right proximal femur.  Pt received: zyprexa 5 mg x2, Ativan 1 mg x1 (24 Feb 2024 19:22)    PAST MEDICAL & SURGICAL HISTORY:  HTN (hypertension)      Hallucinations  Auditory and visual (per daughter)      Left inguinal hernia      Dementia      Hiatal hernia      History of cholecystectomy      History of total abdominal hysterectomy      History of retinal detachment        MEDICATIONS  (STANDING):  dextrose 5%. 1000 milliLiter(s) (80 mL/Hr) IV Continuous <Continuous>  enoxaparin Injectable 40 milliGRAM(s) SubCutaneous once  polyethylene glycol 3350 17 Gram(s) Oral at bedtime  senna 2 Tablet(s) Oral at bedtime    MEDICATIONS  (PRN):  aluminum hydroxide/magnesium hydroxide/simethicone Suspension 30 milliLiter(s) Oral every 8 hours PRN Dyspepsia  magnesium hydroxide Suspension 30 milliLiter(s) Oral daily PRN Constipation  OLANZapine Injectable 2.5 milliGRAM(s) IntraMuscular every 6 hours PRN agitation  ondansetron Injectable 4 milliGRAM(s) IV Push every 6 hours PRN Nausea and/or Vomiting    Allergies    Demerol (Unknown)    Intolerances        REVIEW OF SYSTEMS:    CONSTITUTIONAL: No weakness, fevers or chills.   EYES/ENT: No visual changes;    NECK: No pain or stiffness  RESPIRATORY: No cough, wheezing, No shortness of breath  CARDIOVASCULAR: No chest pain or palpitations  GASTROINTESTINAL: No abdominal pain, or hematochezia.  GENITOURINARY: No dysuria orhematuria  NEUROLOGICAL: No numbness or weakness  SKIN: No itching, burning, rashes  All other review of systems is negative unless indicated above    Vital Signs Last 24 Hrs  T(C): 36.6 (29 Feb 2024 04:24), Max: 36.6 (29 Feb 2024 04:24)  T(F): 97.8 (29 Feb 2024 04:24), Max: 97.8 (29 Feb 2024 04:24)  HR: 80 (29 Feb 2024 04:24) (80 - 82)  BP: 135/66 (29 Feb 2024 04:24) (118/62 - 135/66)  BP(mean): --  RR: 18 (29 Feb 2024 04:24) (17 - 18)  SpO2: 97% (29 Feb 2024 04:24) (94% - 97%)    Parameters below as of 29 Feb 2024 04:24  Patient On (Oxygen Delivery Method): room air      I&O's Summary    28 Feb 2024 07:01  -  29 Feb 2024 07:00  --------------------------------------------------------  IN: 80 mL / OUT: 0 mL / NET: 80 mL        PHYSICAL EXAM:  Constitutional: NAD  Neurological: Alert  HEENT: no JVD, EOMI  Cardiovascular: Regular, S1 and S2, no murmur  Pulmonary: breath sounds bilaterally  Gastrointestinal: Bowel Sounds present, soft, nontender  EXT:  no peripheral edema  Skin: No rashes.  Psych:  Mood calm  LABS: All Labs Reviewed:                          9.6    5.78  )-----------( 161      ( 29 Feb 2024 05:58 )             28.8     02-29    147<H>  |  110<H>  |  16  ----------------------------<  109<H>  3.7   |  31  |  0.48<L>    Ca    8.1<L>      29 Feb 2024 05:58    · Assessment    89 year old female with a history of HTN, dementia who presents with a fall now POD 3 s/p RIGHT IMN Hip repair.    Pt easily arousible although confused and agitated.   Discussed with Dtr at bedside.  Awaiting PT and then home PT    TTE W or WO Ultrasound Enhancing Agent 2.25.24    1. Left ventricular wall thickness is mildly increased. Left ventricular systolic function is normal with an ejection fraction of 60 % by Jimenes's method of disks. There are no regional wall motion abnormalities seen.   2. The left atrium is mildly dilated.   3. Moderate tricuspid regurgitation.   4. Moderate pulmonary hypertension.    Plan:  - ECG with no evidence of ischemia or infarction  - CXR with grossly clear lungs  - Mild hypernatremia with Na increased to 149 again - continue IV fluids as needed  - No evidence of decompensated heart failure on exam  - DVT ppx, pain mgmt, PT eval    pt denies CP,  chronic confusion/memory issues,  discussed with family at bedside  will follow PRN

## 2024-02-29 NOTE — PROGRESS NOTE ADULT - TIME BILLING
direct patient care including but not limited to reviewing chart, medications ,laboratory data, imaging reports, discussion of plan of care with consultants on the case, coordination of care with multidisciplinary team involved in the case and discussion of plan with patient.  Patient and family agreeable to plan of care and verbalized understanding the anticipated hospital course and treatment plan.
direct patient care including but not limited to reviewing chart, medications ,laboratory data, imaging reports, discussion of plan of care with consultants on the case, coordination of care with multidisciplinary team involved in the case and discussion of plan with patient.  Patient and family agreeable to plan of care and verbalized understanding the anticipated hospital course and treatment plan.
pt seen and examine today  see  above  plan -  [facility] fall   intertrochanteric fracture of the right proximal femur  POD 1 s/p R IMN  -PT/OT  WBAT Pain Control DVT ppx:  -Lovenox 40mg daily x 30 day.
pt seen and examine today  see  above  plan -  89 f adavnce age  mech fall   intertrochanteric fracture of the right proximal femur  POD 2 s/p R IMN  -PT/OT  WBAT Pain Control DVT ppx:  -Lovenox 40mg daily x 30 day , pt recomm by pt evaluation catherine but pt daughter want home / home pt .

## 2024-02-29 NOTE — PROGRESS NOTE ADULT - NSPROGADDITIONALINFOA_GEN_ALL_CORE
mild hypernatremia - decrease po intake  , increased IVF
mild hypernatremia - decrease po intake  , increased IVF
mild hypernatremia - decrease po intake  started iv fluid d5w 65 cc/hr - fu bmp in am

## 2024-02-29 NOTE — PROGRESS NOTE ADULT - PROBLEM SELECTOR PLAN 3
- chronic problem  - baseline is speaking but AOx1 per daughter  - pt previously walked unassisted  - pt eats regular diet at home  - Maintain diurnal cycle
- chronic problem  - baseline is speaking but AOx1 per daughter  - pt previously walked unassisted  - with behavioral disturbance: failed behavioral intervention  - start Seroquel, risks including black vicki warning and benefits discussed with daughter at bedside.

## 2024-02-29 NOTE — SOCIAL WORK PROGRESS NOTE - NSSWPROGRESSNOTE_GEN_ALL_CORE
MEG and clinical information sent to [facility] for review. Per MD pt not cleared for dc they continue to monitor pt's labs - sodium levels. Will remain available and continue to follow up.

## 2024-02-29 NOTE — PROGRESS NOTE ADULT - NUTRITIONAL ASSESSMENT
This patient has been assessed with a concern for Malnutrition and has been determined to have a diagnosis/diagnoses of Severe protein-calorie malnutrition and Underweight (BMI < 19).    This patient is being managed with:   Diet Regular-  Supplement Feeding Modality:  Oral  Ensure Plus High Protein Cans or Servings Per Day:  1       Frequency:  Two Times a day  Entered: Feb 26 2024  3:56PM  

## 2024-02-29 NOTE — PROGRESS NOTE ADULT - SUBJECTIVE AND OBJECTIVE BOX
Patient is a 89y old  Female who presents with a chief complaint of right femur fracture (29 Feb 2024 10:03)    Patient seen in follow up for hypernatremia.        PAST MEDICAL HISTORY:  HTN (hypertension)    Hallucinations    Left inguinal hernia    Dementia    Hiatal hernia      MEDICATIONS  (STANDING):  dextrose 5%. 1000 milliLiter(s) (80 mL/Hr) IV Continuous <Continuous>  enoxaparin Injectable 40 milliGRAM(s) SubCutaneous once  polyethylene glycol 3350 17 Gram(s) Oral at bedtime  senna 2 Tablet(s) Oral at bedtime    MEDICATIONS  (PRN):  aluminum hydroxide/magnesium hydroxide/simethicone Suspension 30 milliLiter(s) Oral every 8 hours PRN Dyspepsia  magnesium hydroxide Suspension 30 milliLiter(s) Oral daily PRN Constipation  OLANZapine Injectable 2.5 milliGRAM(s) IntraMuscular every 6 hours PRN agitation  ondansetron Injectable 4 milliGRAM(s) IV Push every 6 hours PRN Nausea and/or Vomiting    T(C): 36.6 (02-29-24 @ 04:24), Max: 36.9 (02-27-24 @ 23:50)  HR: 80 (02-29-24 @ 04:24) (78 - 87)  BP: 135/66 (02-29-24 @ 04:24) (118/62 - 155/68)  RR: 18 (02-29-24 @ 04:24) (17 - 18)  SpO2: 97% (02-29-24 @ 04:24) (94% - 97%)  Wt(kg): --  I&O's Detail    28 Feb 2024 07:01  -  29 Feb 2024 07:00  --------------------------------------------------------  IN:    dextrose 5%: 80 mL  Total IN: 80 mL    OUT:  Total OUT: 0 mL    Total NET: 80 mL          PHYSICAL EXAM:  General: No distress  Respiratory: b/l air entry  Cardiovascular: S1 S2  Gastrointestinal: soft  Extremities:  no edema, leg dressing                              9.6    5.78  )-----------( 161      ( 29 Feb 2024 05:58 )             28.8     02-29    147<H>  |  110<H>  |  16  ----------------------------<  109<H>  3.7   |  31  |  0.48<L>    Ca    8.1<L>      29 Feb 2024 05:58            Urinalysis Basic - ( 29 Feb 2024 05:58 )    Color: x / Appearance: x / SG: x / pH: x  Gluc: 109 mg/dL / Ketone: x  / Bili: x / Urobili: x   Blood: x / Protein: x / Nitrite: x   Leuk Esterase: x / RBC: x / WBC x   Sq Epi: x / Non Sq Epi: x / Bacteria: x        Sodium, Serum: 147 (02-29 @ 05:58)  Sodium, Serum: 149 (02-28 @ 07:53)  Sodium, Serum: 148 (02-27 @ 09:15)  Sodium, Serum: 145 (02-25 @ 12:21)    Creatinine, Serum: 0.48 (02-29 @ 05:58)  Creatinine, Serum: 0.52 (02-28 @ 07:53)  Creatinine, Serum: 0.59 (02-27 @ 09:15)  Creatinine, Serum: 0.62 (02-25 @ 12:21)    Potassium, Serum: 3.7 (02-29 @ 05:58)  Potassium, Serum: 3.7 (02-28 @ 07:53)  Potassium, Serum: 3.7 (02-27 @ 09:15)  Potassium, Serum: 3.6 (02-25 @ 12:21)    Hemoglobin: 9.6 (02-29 @ 05:58)  Hemoglobin: 9.8 (02-28 @ 07:53)  Hemoglobin: 9.8 (02-27 @ 09:15)  Hemoglobin: 12.4 (02-25 @ 12:21)

## 2024-02-29 NOTE — PROGRESS NOTE ADULT - PROBLEM SELECTOR PLAN 2
- large hematoma on left lower leg  - continue to monitor (especially while on DVT PPx, though benefits >> risk of this at this time)
- large hematoma on left lower leg  -  hematoma was aspirated by plastic surgery, improved
- large hematoma on left lower leg  -  hematoma was aspirated by plastic surgery, improved

## 2024-02-29 NOTE — CHART NOTE - NSCHARTNOTEFT_GEN_A_CORE
Discussed with Dr. Jay ([facility]) about resuming DVT ppx given presence of hematoma to LLE.    Safe to continue Lovenox x30days.     No acute orthopaedic surgical intervention indicated at this time. This patient is orthopaedically stable for discharge.   Patient to follow up with Dr. Novoa as an outpatient for further evaluation and management.   All of the patient's questions and concerns were answered and addressed.
Assessment/Follow up: Pt awake/confused with daughter at bedside. Continues on regular diet with ensure plus BID. Tolerating meals however small appetite/po intake. No report N/V/D, fecal incontinence documented. Bowel regimen rx. Hypernatremia ongoing- encourage po fluids. On IVF-D5@80cc/hr. Food/fluid preferences obtained to maximize po intake.     Factors impacting intake: [ ] none [ ] nausea  [ ] vomiting [ ] diarrhea [ ] constipation  [ ]chewing problems [ ] swallowing issues  [x ] other: confusion, lack of appetite    Diet Presciption: Diet, Regular:   Supplement Feeding Modality:  Oral  Ensure Plus High Protein Cans or Servings Per Day:  1       Frequency:  Two Times a day (02-26-24 @ 15:57)    Intake: 0-75% documented in EMR past 3 days    Current Weight: Weight (kg): 43.1 (02-24 @ 19:33); 94.7lbs(2/29)- request current weight for accuracy      Pertinent Medications: MEDICATIONS  (STANDING):  dextrose 5%. 1000 milliLiter(s) (80 mL/Hr) IV Continuous <Continuous>  enoxaparin Injectable 40 milliGRAM(s) SubCutaneous once  polyethylene glycol 3350 17 Gram(s) Oral at bedtime  QUEtiapine 12.5 milliGRAM(s) Oral once  senna 2 Tablet(s) Oral at bedtime    MEDICATIONS  (PRN):  aluminum hydroxide/magnesium hydroxide/simethicone Suspension 30 milliLiter(s) Oral every 8 hours PRN Dyspepsia  magnesium hydroxide Suspension 30 milliLiter(s) Oral daily PRN Constipation  OLANZapine Injectable 2.5 milliGRAM(s) IntraMuscular every 6 hours PRN agitation  ondansetron Injectable 4 milliGRAM(s) IV Push every 6 hours PRN Nausea and/or Vomiting    Pertinent Labs: 02-29 Na147 mmol/L<H> Glu 109 mg/dL<H> K+ 3.7 mmol/L Cr  0.48 mg/dL<L> BUN 16 mg/dL     CAPILLARY BLOOD GLUCOSE        Skin: right hip surgical incision, left buttocks I, coccyx I. liana 14.    Estimated Needs:   [x ] no change since previous assessment  [ ] recalculated:     Previous Nutrition Diagnosis:   [ ] Inadequate Energy Intake [ ]Inadequate Oral Intake [ ] Excessive Energy Intake   [ ] Underweight [ ] Increased Nutrient Needs [ ] Overweight/Obesity   [ ] Altered GI Function [ ] Unintended Weight Loss [ ] Food & Nutrition Related Knowledge Deficit [x ] Malnutrition     Nutrition Diagnosis is [x ] ongoing  [ ] resolved [ ] not applicable     New Nutrition Diagnosis: [x ] not applicable       Interventions:   Recommend  [ ] Change Diet To:  [ ] Nutrition Supplement  [ ] Nutrition Support  [x ] Other: Recommend continued assistance/encouragement with meals/snacks/supplements. Food/fluid preferences obtained & honored. Consider appetite stimulant per MD order. Recommend MVI with minerals daily.     Monitoring and Evaluation:   [x ] PO intake [ x ] Tolerance to diet prescription [ x ] weights [ x ] labs[ x ] follow up per protocol  [ ] other:

## 2024-02-29 NOTE — PROGRESS NOTE ADULT - ASSESSMENT
Hypernatremia: secondary to decreased free water intake  Hypertension  Anemia  s/p fall, Rt hip fracture, s/p surgery    Improving sodium levels. Continue IV hydration. Increase PO fluid intake as tolerated. Monitor BP trend. Titrate BP meds as needed. Salt restriction.   Monitor h/h trend. Will follow electrolytes and renal function trend. D/c planning.

## 2024-03-01 VITALS
HEART RATE: 90 BPM | SYSTOLIC BLOOD PRESSURE: 101 MMHG | RESPIRATION RATE: 18 BRPM | OXYGEN SATURATION: 96 % | TEMPERATURE: 99 F | DIASTOLIC BLOOD PRESSURE: 62 MMHG

## 2024-03-01 LAB
ANION GAP SERPL CALC-SCNC: 4 MMOL/L — LOW (ref 5–17)
BUN SERPL-MCNC: 13 MG/DL — SIGNIFICANT CHANGE UP (ref 7–23)
CALCIUM SERPL-MCNC: 8.3 MG/DL — LOW (ref 8.5–10.1)
CHLORIDE SERPL-SCNC: 111 MMOL/L — HIGH (ref 96–108)
CO2 SERPL-SCNC: 32 MMOL/L — HIGH (ref 22–31)
CREAT SERPL-MCNC: 0.47 MG/DL — LOW (ref 0.5–1.3)
EGFR: 86 ML/MIN/1.73M2 — SIGNIFICANT CHANGE UP
GLUCOSE SERPL-MCNC: 131 MG/DL — HIGH (ref 70–99)
POTASSIUM SERPL-MCNC: 4.6 MMOL/L — SIGNIFICANT CHANGE UP (ref 3.5–5.3)
POTASSIUM SERPL-SCNC: 4.6 MMOL/L — SIGNIFICANT CHANGE UP (ref 3.5–5.3)
SODIUM SERPL-SCNC: 147 MMOL/L — HIGH (ref 135–145)

## 2024-03-01 PROCEDURE — 73502 X-RAY EXAM HIP UNI 2-3 VIEWS: CPT

## 2024-03-01 PROCEDURE — 99285 EMERGENCY DEPT VISIT HI MDM: CPT | Mod: 25

## 2024-03-01 PROCEDURE — 97116 GAIT TRAINING THERAPY: CPT

## 2024-03-01 PROCEDURE — 93306 TTE W/DOPPLER COMPLETE: CPT

## 2024-03-01 PROCEDURE — 85610 PROTHROMBIN TIME: CPT

## 2024-03-01 PROCEDURE — 86901 BLOOD TYPING SEROLOGIC RH(D): CPT

## 2024-03-01 PROCEDURE — 73700 CT LOWER EXTREMITY W/O DYE: CPT | Mod: MC

## 2024-03-01 PROCEDURE — 99239 HOSP IP/OBS DSCHRG MGMT >30: CPT

## 2024-03-01 PROCEDURE — 85025 COMPLETE CBC W/AUTO DIFF WBC: CPT

## 2024-03-01 PROCEDURE — 85027 COMPLETE CBC AUTOMATED: CPT

## 2024-03-01 PROCEDURE — 97166 OT EVAL MOD COMPLEX 45 MIN: CPT

## 2024-03-01 PROCEDURE — 71045 X-RAY EXAM CHEST 1 VIEW: CPT

## 2024-03-01 PROCEDURE — 76000 FLUOROSCOPY <1 HR PHYS/QHP: CPT

## 2024-03-01 PROCEDURE — 72192 CT PELVIS W/O DYE: CPT | Mod: MC

## 2024-03-01 PROCEDURE — 73562 X-RAY EXAM OF KNEE 3: CPT

## 2024-03-01 PROCEDURE — 97530 THERAPEUTIC ACTIVITIES: CPT

## 2024-03-01 PROCEDURE — 97162 PT EVAL MOD COMPLEX 30 MIN: CPT

## 2024-03-01 PROCEDURE — 72125 CT NECK SPINE W/O DYE: CPT | Mod: MC

## 2024-03-01 PROCEDURE — 93005 ELECTROCARDIOGRAM TRACING: CPT

## 2024-03-01 PROCEDURE — 97535 SELF CARE MNGMENT TRAINING: CPT

## 2024-03-01 PROCEDURE — 73552 X-RAY EXAM OF FEMUR 2/>: CPT

## 2024-03-01 PROCEDURE — 86900 BLOOD TYPING SEROLOGIC ABO: CPT

## 2024-03-01 PROCEDURE — C1713: CPT

## 2024-03-01 PROCEDURE — 80048 BASIC METABOLIC PNL TOTAL CA: CPT

## 2024-03-01 PROCEDURE — 85730 THROMBOPLASTIN TIME PARTIAL: CPT

## 2024-03-01 PROCEDURE — 86850 RBC ANTIBODY SCREEN: CPT

## 2024-03-01 PROCEDURE — 96374 THER/PROPH/DIAG INJ IV PUSH: CPT

## 2024-03-01 PROCEDURE — 76376 3D RENDER W/INTRP POSTPROCES: CPT

## 2024-03-01 PROCEDURE — 70450 CT HEAD/BRAIN W/O DYE: CPT | Mod: MC

## 2024-03-01 PROCEDURE — 96372 THER/PROPH/DIAG INJ SC/IM: CPT | Mod: XU

## 2024-03-01 PROCEDURE — 36415 COLL VENOUS BLD VENIPUNCTURE: CPT

## 2024-03-01 PROCEDURE — 73590 X-RAY EXAM OF LOWER LEG: CPT

## 2024-03-01 RX ORDER — ALPRAZOLAM 0.25 MG
0.5 TABLET ORAL
Refills: 0 | DISCHARGE

## 2024-03-01 RX ORDER — ALPRAZOLAM 0.25 MG
0.5 TABLET ORAL
Qty: 5 | Refills: 0
Start: 2024-03-01 | End: 2024-03-10

## 2024-03-01 RX ORDER — ENOXAPARIN SODIUM 100 MG/ML
40 INJECTION SUBCUTANEOUS
Qty: 25 | Refills: 0
Start: 2024-03-01 | End: 2024-03-25

## 2024-03-01 RX ORDER — POLYETHYLENE GLYCOL 3350 17 G/17G
17 POWDER, FOR SOLUTION ORAL
Qty: 0 | Refills: 0 | DISCHARGE
Start: 2024-03-01

## 2024-03-01 RX ADMIN — SODIUM CHLORIDE 100 MILLILITER(S): 9 INJECTION, SOLUTION INTRAVENOUS at 06:31

## 2024-03-01 NOTE — PROGRESS NOTE ADULT - SUBJECTIVE AND OBJECTIVE BOX
Patient is a 89y old  Female who presents with a chief complaint of right femur fracture (29 Feb 2024 10:03)    Patient seen in follow up for hypernatremia.        PAST MEDICAL HISTORY:  HTN (hypertension)    Hallucinations    Left inguinal hernia    Dementia    Hiatal hernia        MEDICATIONS  (STANDING):  dextrose 5%. 1000 milliLiter(s) (100 mL/Hr) IV Continuous <Continuous>  polyethylene glycol 3350 17 Gram(s) Oral at bedtime  QUEtiapine 12.5 milliGRAM(s) Oral at bedtime  senna 2 Tablet(s) Oral at bedtime    MEDICATIONS  (PRN):  acetaminophen     Tablet .. 650 milliGRAM(s) Oral every 6 hours PRN Mild Pain (1 - 3)  aluminum hydroxide/magnesium hydroxide/simethicone Suspension 30 milliLiter(s) Oral every 8 hours PRN Dyspepsia  bisacodyl Suppository 10 milliGRAM(s) Rectal once PRN Constipation  magnesium hydroxide Suspension 30 milliLiter(s) Oral daily PRN Constipation  ondansetron Injectable 4 milliGRAM(s) IV Push every 6 hours PRN Nausea and/or Vomiting    T(C): 36.5 (03-01-24 @ 04:35), Max: 36.6 (02-29-24 @ 04:24)  HR: 70 (03-01-24 @ 04:35) (66 - 82)  BP: 123/74 (03-01-24 @ 04:35) (106/65 - 135/66)  RR: 18 (03-01-24 @ 04:35)  SpO2: 98% (03-01-24 @ 04:35)  Wt(kg): --  I&O's Detail    29 Feb 2024 07:01  -  01 Mar 2024 07:00  --------------------------------------------------------  IN:    dextrose 5%: 240 mL    dextrose 5%: 1600 mL  Total IN: 1840 mL    OUT:    Voided (mL): 1000 mL  Total OUT: 1000 mL    Total NET: 840 mL      01 Mar 2024 07:01  -  01 Mar 2024 13:18  --------------------------------------------------------  IN:  Total IN: 0 mL    OUT:    Voided (mL): 800 mL  Total OUT: 800 mL    Total NET: -800 mL              PHYSICAL EXAM:  General: No distress  Respiratory: b/l air entry  Cardiovascular: S1 S2  Gastrointestinal: soft  Extremities:  no edema, leg dressing                        LABORATORY:                        9.6    5.78  )-----------( 161      ( 29 Feb 2024 05:58 )             28.8     03-01    147<H>  |  111<H>  |  13  ----------------------------<  131<H>  4.6   |  32<H>  |  0.47<L>    Ca    8.3<L>      01 Mar 2024 11:48      Sodium: 147 mmol/L (03-01 @ 11:48)  Sodium: 147 mmol/L (02-29 @ 05:58)    Potassium: 4.6 mmol/L (03-01 @ 11:48)  Potassium: 3.7 mmol/L (02-29 @ 05:58)    Hemoglobin: 9.6 g/dL (02-29 @ 05:58)  Hemoglobin: 9.8 g/dL (02-28 @ 07:53)    Creatinine, Serum 0.47 (03-01 @ 11:48)  Creatinine, Serum 0.48 (02-29 @ 05:58)  Creatinine, Serum 0.52 (02-28 @ 07:53)          Urinalysis Basic - ( 01 Mar 2024 11:48 )    Color: x / Appearance: x / SG: x / pH: x  Gluc: 131 mg/dL / Ketone: x  / Bili: x / Urobili: x   Blood: x / Protein: x / Nitrite: x   Leuk Esterase: x / RBC: x / WBC x   Sq Epi: x / Non Sq Epi: x / Bacteria: x

## 2024-03-01 NOTE — PROGRESS NOTE ADULT - SUBJECTIVE AND OBJECTIVE BOX
[facility] Cardiology    CHIEF COMPLAINT: Patient is a 89y old  Female who presents with a chief complaint of right femur fracture (29 Feb 2024 15:45)      Follow Up: [ ] Chest Pain      [ ] Dyspnea     [ ] Palpitations    [ ] Atrial Fibrillation     [ ] Ventricular Dysrhythmia    [ ] Abnormal EKG                      [ ] Abnormal Cardiac Enzymes     [ ] Valvular Disease    HPI:  89-year-old female past medical history of severe advanced dementia, HTN, presented to ED brought in by her daughter for right hip pain and left leg hematoma s/p a mechanical fall today. Unable to obtain history from pt as she received zyprexa and ativan prior to exam, history provided by daughter. At 11:30 today the pt was walking in the dining room and tripped on the table leg, falling on her right side. The daughter helped her up, but the pt was complaining of right leg pain and was unable to walk. Daughter denies head trauma. At baseline the pt is AOx1 and walks unassisted. The pt also hit her left leg on the table leg, and now has large hematoma there. Denies anticoagulation use.   Daughter denies hx reactions to anesthesia.     ED COURSE:  Vitals: T 98.7, HR 85, BP 99/63, RR 17, SpO2 94% on RA  Labs significant for: WBC 11.12, INR 1.04, Na 147,   Imaging:  CT HEAD: Mild periventricular white matter ischemia. Global atrophy most prominent in the BILATERAL temporal lobes. Chronic LEFT maxillary sinusitis.  CT cervical spine:   No vertebral fracture is recognized.  Straightening on a degenerative basis with grade 1 anterior spondylolisthesis at C7-T1 on a degenerative basis.   advanced degenerative disc disease and spondylosis at C5-6 and C6-7 with loss of disc height and associated degenerative endplate changes. Narrowing of the LEFT C4-5, BILATERAL C5-6 and C6/7 neural foramina due to uncovertebral spurring and facet osteophytic hypertrophy. Posterior osteophytic ridge/disc complexes at C5-6 and C6-7 flatten the ventral thecal sac  CT pelvis: Mild varus angulation of an intertrochanteric fracture of the right proximal femur.  Pt received: zyprexa 5 mg x2, Ativan 1 mg x1 (24 Feb 2024 19:22)    PAST MEDICAL & SURGICAL HISTORY:  HTN (hypertension)      Hallucinations  Auditory and visual (per daughter)      Left inguinal hernia      Dementia      Hiatal hernia      History of cholecystectomy      History of total abdominal hysterectomy      History of retinal detachment        MEDICATIONS  (STANDING):  dextrose 5%. 1000 milliLiter(s) (100 mL/Hr) IV Continuous <Continuous>  polyethylene glycol 3350 17 Gram(s) Oral at bedtime  QUEtiapine 12.5 milliGRAM(s) Oral at bedtime  senna 2 Tablet(s) Oral at bedtime    MEDICATIONS  (PRN):  acetaminophen     Tablet .. 650 milliGRAM(s) Oral every 6 hours PRN Mild Pain (1 - 3)  aluminum hydroxide/magnesium hydroxide/simethicone Suspension 30 milliLiter(s) Oral every 8 hours PRN Dyspepsia  bisacodyl Suppository 10 milliGRAM(s) Rectal once PRN Constipation  magnesium hydroxide Suspension 30 milliLiter(s) Oral daily PRN Constipation  ondansetron Injectable 4 milliGRAM(s) IV Push every 6 hours PRN Nausea and/or Vomiting    Allergies    Demerol (Unknown)    Intolerances        REVIEW OF SYSTEMS:    CONSTITUTIONAL: No weakness, fevers or chills.   EYES/ENT: No visual changes;    NECK: No pain or stiffness  RESPIRATORY: No cough, wheezing, No shortness of breath  CARDIOVASCULAR: No chest pain or palpitations  GASTROINTESTINAL: No abdominal pain, or hematochezia.  GENITOURINARY: No dysuria orhematuria  NEUROLOGICAL: No numbness or weakness  SKIN: No itching, burning, rashes  All other review of systems is negative unless indicated above    Vital Signs Last 24 Hrs  T(C): 36.5 (01 Mar 2024 04:35), Max: 36.5 (29 Feb 2024 14:20)  T(F): 97.7 (01 Mar 2024 04:35), Max: 97.7 (29 Feb 2024 14:20)  HR: 70 (01 Mar 2024 04:35) (66 - 81)  BP: 123/74 (01 Mar 2024 04:35) (106/65 - 123/74)  BP(mean): --  RR: 18 (01 Mar 2024 04:35) (17 - 18)  SpO2: 98% (01 Mar 2024 04:35) (97% - 98%)    Parameters below as of 01 Mar 2024 04:35  Patient On (Oxygen Delivery Method): room air      I&O's Summary    29 Feb 2024 07:01  -  01 Mar 2024 07:00  --------------------------------------------------------  IN: 1840 mL / OUT: 1000 mL / NET: 840 mL    PHYSICAL EXAM:  Constitutional: NAD  Neurological:calm  Psych:  Mood calm  LABS: All Labs Reviewed:                          9.6    5.78  )-----------( 161      ( 29 Feb 2024 05:58 )             28.8     02-29    147<H>  |  110<H>  |  16  ----------------------------<  109<H>  3.7   |  31  |  0.48<L>    Ca    8.1<L>      29 Feb 2024 05:58  89 year old female with a history of HTN, dementia who presents with a fall now s/p RIGHT IMN Hip repair.    Pt easily arousible although confused and agitated.   Discussed with Dtr at bedside.  Awaiting PT and then home PT  TTE W or WO Ultrasound Enhancing Agent 2.25.24    1. Left ventricular wall thickness is mildly increased. Left ventricular systolic function is normal with an ejection fraction of 60 % by Jimenes's method of disks. There are no regional wall motion abnormalities seen.   2. The left atrium is mildly dilated.   3. Moderate tricuspid regurgitation.   4. Moderate pulmonary hypertension.  Plan:  - ECG with no evidence of ischemia or infarction  - CXR with grossly clear lungs  - Mild hypernatremia with Na increased to 149 again - continue IV fluids as needed  - No evidence of decompensated heart failure on exam  - DVT ppx, pain mgmt, PT eval  pt denies CP,  chronic confusion/memory issues,  discussed with family at bedside thursday  will follow PRN, call if needed

## 2024-03-01 NOTE — PROGRESS NOTE ADULT - REASON FOR ADMISSION
right femur fracture

## 2024-03-01 NOTE — PROGRESS NOTE ADULT - PROVIDER SPECIALTY LIST ADULT
Orthopedics
Cardiology
Nephrology
Orthopedics
Cardiology
Nephrology
Cardiology
Internal Medicine
Hospitalist

## 2024-03-01 NOTE — PROGRESS NOTE ADULT - ASSESSMENT
Hypernatremia: secondary to decreased free water intake  Hypertension  Anemia  s/p fall, Rt hip fracture, s/p surgery    Stable sodium levels. Increase PO fluid intake as tolerated. Monitor BP trend. Titrate BP meds as needed.  Monitor h/h trend. Will follow electrolytes and renal function trend. Ok for d/c from renal stand point and monitor labs as out pt.

## 2024-03-01 NOTE — SOCIAL WORK PROGRESS NOTE - NSSWPROGRESSNOTE_GEN_ALL_CORE
Per medical team, pt cleared for dc to [facility], bed confirmed at [facility]. Updates discussed with pt's daughter/Opal. Zeferino pfeiffer scheduled for 5pm.

## 2024-03-13 PROBLEM — F03.90 UNSPECIFIED DEMENTIA, UNSPECIFIED SEVERITY, WITHOUT BEHAVIORAL DISTURBANCE, PSYCHOTIC DISTURBANCE, MOOD DISTURBANCE, AND ANXIETY: Chronic | Status: ACTIVE | Noted: 2024-02-24

## 2024-03-13 PROBLEM — K44.9 DIAPHRAGMATIC HERNIA WITHOUT OBSTRUCTION OR GANGRENE: Chronic | Status: ACTIVE | Noted: 2024-02-24

## 2024-03-15 PROBLEM — Z00.00 ENCOUNTER FOR PREVENTIVE HEALTH EXAMINATION: Status: ACTIVE | Noted: 2024-03-15

## 2024-03-21 NOTE — ED PROVIDER NOTE - MUSCULOSKELETAL, MLM
Billing Type: Third-Party Bill
Performing Laboratory: 0
Expected Date Of Service: 03/21/2024
Bill For Surgical Tray: no
Spine appears normal, range of motion is not limited, no muscle or joint tenderness

## 2024-03-29 ENCOUNTER — APPOINTMENT (OUTPATIENT)
Dept: ORTHOPEDIC SURGERY | Facility: CLINIC | Age: 89
End: 2024-03-29
Payer: MEDICARE

## 2024-03-29 DIAGNOSIS — M25.551 PAIN IN RIGHT HIP: ICD-10-CM

## 2024-03-29 PROCEDURE — 99024 POSTOP FOLLOW-UP VISIT: CPT

## 2024-03-29 PROCEDURE — 73502 X-RAY EXAM HIP UNI 2-3 VIEWS: CPT | Mod: TC

## 2024-03-29 NOTE — HISTORY OF PRESENT ILLNESS
[de-identified] : Right hip closed reduction intramedullary nail fixation of intertrochanteric femur fracture on 2/25/2024 [de-identified] : She presents for her first postoperative visit after above surgery.  She is accompanied by her daughter who provides history since the patient has dementia.  She reports that her mother has not complained of hip pain.  She has been at the nursing facility.  She denies any fevers or chills and denies that she has had any drainage from her incisions. [de-identified] : General: No apparent distress Cardiovascular: extremities warm and well-perfused, no cyanosis Pulmonary: non labored respirations  Right hip: Incisions healing well with no erythema or drainage Unable to fully examine hip given dementia [de-identified] : 3 views of the right hip obtained today and interpreted by me demonstrate right hip intramedullary nail and stable appearance to previous x-rays.  AP view was not able to be obtained due to patient dementia and lack of cooperation  Images reviewed in detail with the patient [de-identified] : I discussed with her daughter that the fracture appears to be healing and the implant appears stable.  She should continue with physical therapy and may continue to be weightbearing as tolerated.  The next postoperative visit should be 6 to 8 weeks from now.  She may obtain an outpatient x-ray and may do a telehealth visit.  I have personally obtained the history, reviewed the ROS as noted, and performed the physical examination today. The patient's daughter and I discussed the assessment and options and developed the plan. All questions were answered and the patient's daughter stated their understanding of the treatment plan and appreciation of the visit.  My cumulative time spent on this patient's visit included: Preparation for the visit, review of the medical records, review of pertinent diagnostic studies, examination and counseling of the patient on the above diagnosis, treatment plan and prognosis, orders of diagnostic tests, medications and/or appropriate procedures and documentation in the medical records of today's visit.  This note was generated using dragon medical dictation software.  A reasonable effort has been made for proofreading its contents, but typos may still remain.  If there are any questions or points of clarification needed please notify my office. [de-identified] : First postoperative visit after above surgery

## 2025-05-24 NOTE — DIETITIAN INITIAL EVALUATION ADULT. - PROBLEM SELECTOR PLAN 1
room air Nausea/vomiting with CT evidence of SBO  - Admit to [facility]  - NG tube placed in ED, confirmed placement with XR; continue to monitor output  - NPO; maintenance IVFs at 75 ml/hr  - Surgery, Dr. Mora following  - Fall precautions, bed alarm  - Out of bed with assistance

## (undated) DEVICE — PLV/PSP-ESU T7E14761DX: Type: DURABLE MEDICAL EQUIPMENT

## (undated) DEVICE — SUT POLYSORB 0 30" GS-21 UNDYED

## (undated) DEVICE — GLV 8 PROTEXIS (WHITE)

## (undated) DEVICE — PLV-STRYKER SYSTEM 8: Type: DURABLE MEDICAL EQUIPMENT

## (undated) DEVICE — PACK HIP FRACTURE

## (undated) DEVICE — DRILL BIT SYNTHES ORTHO CALIBRATED 4.2MM X 330MM

## (undated) DEVICE — DRAPE TOWEL BLUE 17" X 24"

## (undated) DEVICE — DRAPE SHOWER CURTAIN ISOLATION

## (undated) DEVICE — DRAPE MAYO STAND 23"

## (undated) DEVICE — SUT POLYSORB 2-0 30" C-14 UNDYED

## (undated) DEVICE — WARMING BLANKET UPPER ADULT

## (undated) DEVICE — SUT POLYSORB 1 27" GS-21 UNDYED

## (undated) DEVICE — SUT MONOCRYL 3-0 18" PS-2 UNDYED

## (undated) DEVICE — DRSG DERMABOND 0.7ML

## (undated) DEVICE — DRAPE TOWEL 1010

## (undated) DEVICE — PLV-CONSIGN STRYKER GAMMA OPTIONAL INST: Type: DURABLE MEDICAL EQUIPMENT

## (undated) DEVICE — GUIDEWIRE SYNTHES 3.2MM X 400MM

## (undated) DEVICE — PLV-SCD MACHINE: Type: DURABLE MEDICAL EQUIPMENT

## (undated) DEVICE — DRSG AQUACEL 3.5 X 10"

## (undated) DEVICE — SOL IRR POUR NS 0.9% 1000ML

## (undated) DEVICE — DRAPE C ARM UNIVERSAL

## (undated) DEVICE — VENODYNE/SCD SLEEVE CALF MEDIUM

## (undated) DEVICE — PLV-STRYKER GAMMA 3 TARGETING DEVICE: Type: DURABLE MEDICAL EQUIPMENT

## (undated) DEVICE — STAPLER SKIN PROXIMATE

## (undated) DEVICE — DRSG AQUACEL 3.5 X 6"

## (undated) DEVICE — VENODYNE/SCD SLEEVE CALF LARGE

## (undated) DEVICE — SOL IRR POUR H2O 1000ML